# Patient Record
Sex: MALE | Race: WHITE | NOT HISPANIC OR LATINO | Employment: UNEMPLOYED | URBAN - METROPOLITAN AREA
[De-identification: names, ages, dates, MRNs, and addresses within clinical notes are randomized per-mention and may not be internally consistent; named-entity substitution may affect disease eponyms.]

---

## 2017-11-05 ENCOUNTER — APPOINTMENT (EMERGENCY)
Dept: CT IMAGING | Facility: HOSPITAL | Age: 52
End: 2017-11-05
Payer: COMMERCIAL

## 2017-11-05 ENCOUNTER — HOSPITAL ENCOUNTER (EMERGENCY)
Facility: HOSPITAL | Age: 52
Discharge: HOME | End: 2017-11-05
Attending: EMERGENCY MEDICINE
Payer: COMMERCIAL

## 2017-11-05 VITALS
SYSTOLIC BLOOD PRESSURE: 133 MMHG | TEMPERATURE: 98.1 F | RESPIRATION RATE: 16 BRPM | WEIGHT: 247 LBS | DIASTOLIC BLOOD PRESSURE: 76 MMHG | HEART RATE: 76 BPM | OXYGEN SATURATION: 100 %

## 2017-11-05 DIAGNOSIS — M54.50 LUMBAGO: Primary | ICD-10-CM

## 2017-11-05 LAB
ANION GAP SERPL CALCULATED.3IONS-SCNC: 12 MMOL/L (ref 4–13)
BACTERIA UR QL AUTO: ABNORMAL /HPF
BILIRUB UR QL STRIP: ABNORMAL
BUN SERPL-MCNC: 16 MG/DL (ref 5–25)
CALCIUM SERPL-MCNC: 9.3 MG/DL (ref 8.3–10.1)
CHLORIDE SERPL-SCNC: 102 MMOL/L (ref 100–108)
CLARITY UR: CLEAR
CO2 SERPL-SCNC: 27 MMOL/L (ref 21–32)
COLOR UR: YELLOW
COLOR, POC: YELLOW
CREAT SERPL-MCNC: 1.15 MG/DL (ref 0.6–1.3)
GFR SERPL CREATININE-BSD FRML MDRD: 73 ML/MIN/1.73SQ M
GLUCOSE SERPL-MCNC: 115 MG/DL (ref 65–140)
GLUCOSE UR STRIP-MCNC: NEGATIVE MG/DL
HGB UR QL STRIP.AUTO: NEGATIVE
KETONES UR STRIP-MCNC: NEGATIVE MG/DL
LEUKOCYTE ESTERASE UR QL STRIP: NEGATIVE
MUCOUS THREADS UR QL AUTO: ABNORMAL
NITRITE UR QL STRIP: NEGATIVE
NON-SQ EPI CELLS URNS QL MICRO: ABNORMAL /HPF
PH UR STRIP.AUTO: 5.5 [PH] (ref 4.5–8)
POTASSIUM SERPL-SCNC: 3.7 MMOL/L (ref 3.5–5.3)
PROT UR STRIP-MCNC: ABNORMAL MG/DL
RBC #/AREA URNS AUTO: ABNORMAL /HPF
SODIUM SERPL-SCNC: 141 MMOL/L (ref 136–145)
SP GR UR STRIP.AUTO: 1.02 (ref 1–1.03)
UROBILINOGEN UR QL STRIP.AUTO: 0.2 E.U./DL
WBC #/AREA URNS AUTO: ABNORMAL /HPF

## 2017-11-05 PROCEDURE — 96375 TX/PRO/DX INJ NEW DRUG ADDON: CPT

## 2017-11-05 PROCEDURE — 81002 URINALYSIS NONAUTO W/O SCOPE: CPT | Performed by: EMERGENCY MEDICINE

## 2017-11-05 PROCEDURE — 80048 BASIC METABOLIC PNL TOTAL CA: CPT | Performed by: EMERGENCY MEDICINE

## 2017-11-05 PROCEDURE — 81001 URINALYSIS AUTO W/SCOPE: CPT

## 2017-11-05 PROCEDURE — 74176 CT ABD & PELVIS W/O CONTRAST: CPT

## 2017-11-05 PROCEDURE — 36415 COLL VENOUS BLD VENIPUNCTURE: CPT | Performed by: EMERGENCY MEDICINE

## 2017-11-05 PROCEDURE — 96374 THER/PROPH/DIAG INJ IV PUSH: CPT

## 2017-11-05 PROCEDURE — 99284 EMERGENCY DEPT VISIT MOD MDM: CPT

## 2017-11-05 RX ORDER — OXYCODONE HYDROCHLORIDE AND ACETAMINOPHEN 5; 325 MG/1; MG/1
1 TABLET ORAL EVERY 4 HOURS PRN
Qty: 5 TABLET | Refills: 0 | Status: SHIPPED | OUTPATIENT
Start: 2017-11-05 | End: 2017-11-15

## 2017-11-05 RX ORDER — DIAZEPAM 5 MG/ML
10 INJECTION, SOLUTION INTRAMUSCULAR; INTRAVENOUS ONCE
Status: COMPLETED | OUTPATIENT
Start: 2017-11-05 | End: 2017-11-05

## 2017-11-05 RX ORDER — OXYCODONE HYDROCHLORIDE AND ACETAMINOPHEN 5; 325 MG/1; MG/1
1 TABLET ORAL ONCE
Status: COMPLETED | OUTPATIENT
Start: 2017-11-05 | End: 2017-11-05

## 2017-11-05 RX ORDER — METHOCARBAMOL 750 MG/1
750 TABLET, FILM COATED ORAL 3 TIMES DAILY PRN
Qty: 42 TABLET | Refills: 0 | Status: SHIPPED | OUTPATIENT
Start: 2017-11-05 | End: 2018-07-25

## 2017-11-05 RX ORDER — KETOROLAC TROMETHAMINE 30 MG/ML
15 INJECTION, SOLUTION INTRAMUSCULAR; INTRAVENOUS ONCE
Status: COMPLETED | OUTPATIENT
Start: 2017-11-05 | End: 2017-11-05

## 2017-11-05 RX ORDER — PREDNISONE 20 MG/1
20 TABLET ORAL 2 TIMES DAILY
Qty: 10 TABLET | Refills: 0 | Status: SHIPPED | OUTPATIENT
Start: 2017-11-05 | End: 2018-07-25

## 2017-11-05 RX ADMIN — OXYCODONE HYDROCHLORIDE AND ACETAMINOPHEN 1 TABLET: 5; 325 TABLET ORAL at 16:43

## 2017-11-05 RX ADMIN — KETOROLAC TROMETHAMINE 15 MG: 30 INJECTION, SOLUTION INTRAMUSCULAR at 14:18

## 2017-11-05 RX ADMIN — DIAZEPAM 10 MG: 5 INJECTION, SOLUTION INTRAMUSCULAR; INTRAVENOUS at 14:18

## 2017-11-05 NOTE — ED NOTES
Pt states that he has had back pain before and states that he started on Friday with back pain that was going down his right leg  Pt is unable to sit or lay with out a ton of pain       Gloria Garrison RN  11/05/17 8668

## 2017-11-05 NOTE — ED PROVIDER NOTES
History  Chief Complaint   Patient presents with    Back Pain     patient reports three days ago waking up with lower back pain which radiates to both lets, but mainly to the R leg  patient denies numbness, reports tingling in the R arm and sharp pains in the L side of his chest  patient states he has had back pain in the past, but reports this pain is new for him  Patient was at pcp's and prescribed muscle relaxers which he states has given him no relief  47 yo male c/o onset of pain he localizes to right lower SI area, radiating down right leg, that seemed to be fairly sudden onset about 3 weeks ago, followed up with PCP and tried flexeril but it hasn't made any difference  The pain is more exacerbated today  He denies abdominal pain, but says "tightening" his abdomen feels better, and if he relaxes it causes back pain  He has h/o back pain for which he has previously sought ED care, but this seems different  No h/o kidney stones  History provided by:  Patient  Back Pain   Location:  Lumbar spine  Quality:  Aching  Pain severity:  Moderate  Onset quality:  Gradual  Duration:  3 weeks  Chronicity:  Recurrent (although he says this pain is actually, different and worse than usual)  Relieved by:  Nothing  Worsened by: Movement  Ineffective treatments:  NSAIDs and muscle relaxants  Associated symptoms: paresthesias    Associated symptoms: no abdominal pain, no bladder incontinence, no bowel incontinence, no chest pain, no dysuria, no fever, no headaches, no perianal numbness, no tingling and no weakness        None       Past Medical History:   Diagnosis Date    Collapsed lung     Hypertension     Umbilical hernia        Past Surgical History:   Procedure Laterality Date    ROTATOR CUFF REPAIR         History reviewed  No pertinent family history  I have reviewed and agree with the history as documented      Social History   Substance Use Topics    Smoking status: Former Smoker    Smokeless tobacco: Not on file    Alcohol use No        Review of Systems   Constitutional: Negative for appetite change, chills and fever  HENT: Negative for sore throat  Respiratory: Negative for cough, shortness of breath and wheezing  Cardiovascular: Negative for chest pain and palpitations  Gastrointestinal: Negative for abdominal pain, bowel incontinence, diarrhea, nausea and vomiting  Genitourinary: Negative for bladder incontinence, dysuria and hematuria  Musculoskeletal: Positive for back pain  Negative for neck pain  Skin: Negative for rash  Neurological: Positive for paresthesias  Negative for dizziness, tingling, weakness and headaches  Psychiatric/Behavioral: Negative for suicidal ideas  All other systems reviewed and are negative  Physical Exam  ED Triage Vitals   Temperature Pulse Respirations Blood Pressure SpO2   11/05/17 1313 11/05/17 1311 11/05/17 1311 11/05/17 1311 11/05/17 1311   98 1 °F (36 7 °C) 98 20 (!) 160/106 96 %      Temp Source Heart Rate Source Patient Position - Orthostatic VS BP Location FiO2 (%)   11/05/17 1313 11/05/17 1311 11/05/17 1311 11/05/17 1311 --   Oral Monitor Sitting Right arm       Pain Score       11/05/17 1311       Worst Possible Pain           Orthostatic Vital Signs  Vitals:    11/05/17 1311 11/05/17 1423   BP: (!) 160/106 125/96   Pulse: 98 94   Patient Position - Orthostatic VS: Sitting Sitting       Physical Exam   Constitutional: He is oriented to person, place, and time  He appears well-developed and well-nourished  Patient standing in the room upon initial examination  Seems to prefer a static position, pain is manageable standing, or sitting, but changing position reproduces disocomfort   HENT:   Head: Normocephalic and atraumatic     Right Ear: External ear normal    Left Ear: External ear normal    Nose: Nose normal    Mouth/Throat: Oropharynx is clear and moist    Eyes: Conjunctivae and EOM are normal  Pupils are equal, round, and reactive to light  Neck: Normal range of motion  Neck supple  Cardiovascular: Normal rate  Pulmonary/Chest: Effort normal    Abdominal: There is no tenderness  Musculoskeletal: Normal range of motion  Neurological: He is alert and oriented to person, place, and time  He displays no atrophy and normal reflexes  No cranial nerve deficit  Gait (antalgic gait, but bears weight, transfers to sitting, ) abnormal  Coordination normal  GCS eye subscore is 4  GCS verbal subscore is 5  GCS motor subscore is 6  Straight leg positive on the right   Skin: Skin is warm and dry  Psychiatric: He has a normal mood and affect  His behavior is normal  Judgment and thought content normal    Nursing note and vitals reviewed        ED Medications  Medications   oxyCODONE-acetaminophen (PERCOCET) 5-325 mg per tablet 1 tablet (not administered)   ketorolac (TORADOL) 30 mg/mL injection 15 mg (15 mg Intravenous Given 11/5/17 1418)   diazepam (VALIUM) injection 10 mg (10 mg Intravenous Given 11/5/17 1418)       Diagnostic Studies  Results Reviewed     Procedure Component Value Units Date/Time    Urine Microscopic [[id number]]  (Abnormal) Collected:  11/05/17 1605    Lab Status:  Final result Specimen:  Urine Updated:  11/05/17 1508     RBC, UA 0-1 (A) /hpf      WBC, UA 1-2 (A) /hpf      Epithelial Cells Occasional /hpf      Bacteria, UA Occasional /hpf      MUCOUS THREADS Occasional    POCT urinalysis dipstick [[id number]]  (Normal) Resulted:  11/05/17 1452    Lab Status:  Final result Specimen:  Urine Updated:  11/05/17 1452     Color, UA yellow    ED Urine Macroscopic [[id number]]  (Abnormal) Collected:  11/05/17 1605    Lab Status:  Final result Specimen:  Urine Updated:  11/05/17 1450     Color, UA Yellow     Clarity, UA Clear     pH, UA 5 5     Leukocytes, UA Negative     Nitrite, UA Negative     Protein, UA Trace (A) mg/dl      Glucose, UA Negative mg/dl      Ketones, UA Negative mg/dl      Urobilinogen, UA 0 2 E U /dl Bilirubin, UA Interference- unable to analyze (A)     Blood, UA Negative     Specific Gravity, UA 1 020    Narrative:       CLINITEK RESULT    Basic metabolic panel [[id number]] Collected:  11/05/17 1417    Lab Status:  Final result Specimen:  Blood from Arm, Right Updated:  11/05/17 1435     Sodium 141 mmol/L      Potassium 3 7 mmol/L      Chloride 102 mmol/L      CO2 27 mmol/L      Anion Gap 12 mmol/L      BUN 16 mg/dL      Creatinine 1 15 mg/dL      Glucose 115 mg/dL      Calcium 9 3 mg/dL      eGFR 73 ml/min/1 73sq m     Narrative:         National Kidney Disease Education Program recommendations are as follows:  GFR calculation is accurate only with a steady state creatinine  Chronic Kidney disease less than 60 ml/min/1 73 sq  meters  Kidney failure less than 15 ml/min/1 73 sq  meters  CT recon only lumbar spine   Final Result by Jen Peralta MD (11/05 1613)      No fracture or traumatic subluxation  Workstation performed: [id number]         CT abdomen pelvis wo contrast   Final Result by Jen Peralta MD (11/05 1612)      No urinary tract calculi  Bladder wall thickening may be due to underdistention, however, cystitis is not excluded  Correlation with urinalysis recommended  Hepatosplenomegaly  Hepatic steatosis  Workstation performed: [id number]                    Procedures  Procedures       Phone Contacts  ED Phone Contact    ED Course  ED Course as of Nov 05 1634   Ines Han Nov 05, 2017   5248 He is more comfortable after ED treatment  Imaging studies reassuring  No focal deficits                                    MDM  CritCare Time    Disposition  Final diagnoses:   Lumbago     Time reflects when diagnosis was documented in both MDM as applicable and the Disposition within this note     Time User Action Codes Description Comment    11/5/2017  4:33 PM Tisha Frankel Add [M54 5] Kenan 3958       ED Disposition     ED Disposition Condition Comment    Discharge Hi Lowe discharge to home/self care  Condition at discharge: Good        Follow-up Information     Follow up With Specialties Details Why Selena Bianchi MD  Call For followup [address] 743 [phone] [address] Pain Medicine Go to For followup, Next available, If symptoms worsen Anirudh Velasco  [address]  [phone]        Patient's Medications   Discharge Prescriptions    METHOCARBAMOL (ROBAXIN) 750 MG TABLET    Take 1 tablet by mouth 3 (three) times a day as needed for muscle spasms       Start Date: 11/5/2017 End Date: --       Order Dose: 750 mg       Quantity: 42 tablet    Refills: 0    OXYCODONE-ACETAMINOPHEN (PERCOCET) 5-325 MG PER TABLET    Take 1 tablet by mouth every 4 (four) hours as needed for severe pain for up to 10 days Max Daily Amount: 6 tablets       Start Date: 11/5/2017 End Date: 11/15/2017       Order Dose: 1 tablet       Quantity: 5 tablet    Refills: 0    PREDNISONE 20 MG TABLET    Take 1 tablet by mouth 2 (two) times a day       Start Date: 11/5/2017 End Date: --       Order Dose: 20 mg       Quantity: 10 tablet    Refills: 0     No discharge procedures on file      ED Provider  Electronically Signed by           Spencer Sullivan MD  11/05/17 9182

## 2017-11-05 NOTE — DISCHARGE INSTRUCTIONS
Low Back Strain   GENERAL INFORMATION:   Low back strain  is an injury to your lower back muscles or tendons  Tendons are strong tissues that connect muscles to bones  The lower back supports most of your body weight and helps you move, twist, and bend  Low back strain is usually caused by activities that increase stress on the lower back, such as exercise or injury  Common symptoms include the following:   · Low back pain or muscle spasms    · Stiffness or limited movement    · Pain that goes down to the buttocks, groin, or legs    · Pain that is worse with activity  Seek immediate care for the following symptoms:   · A pop in your lower back    · Increased swelling or pain in your lower back    · Trouble moving your legs    · Numbness in your legs  Treatment for low back strain:   · NSAIDs  help decrease swelling and pain or fever  This medicine is available with or without a doctor's order  NSAIDs can cause stomach bleeding or kidney problems in certain people  If you take blood thinner medicine, always ask your healthcare provider if NSAIDs are safe for you  Always read the medicine label and follow directions  · Muscle relaxers  help decrease muscle spasms pain  ·   Manage your symptoms:   · Rest  in bed after your injury  Slowly start to increase your activity as the pain decreases, or as directed  · Apply ice  on your lower back for 15 to 20 minutes every hour or as directed  Use an ice pack, or put crushed ice in a plastic bag  Cover it with a towel  Ice helps prevent tissue damage and decreases swelling and pain  You can alternate ice and heat  · Apply heat  on your lower back for 20 to 30 minutes every 2 hours for as many days as directed  Heat helps decrease pain and muscle spasms  Prevent another low back strain:   · Use proper body mechanics  ¨ Bend at the hips and knees when you  objects  Do not bend from the waist  Use your leg muscles as you lift the load   Do not use your back  Keep the object close to your chest as you lift it  Try not to twist or lift anything above your waist     ¨ Change your position often when you stand for long periods of time  Rest one foot on a small box or footrest, and then switch to the other foot often  ¨ Try not to sit for long periods of time  When you do, sit in a straight-backed chair with your feet flat on the floor  Never reach, pull, or push while you are sitting      ·   Follow up with your healthcare provider as directed

## 2018-01-23 ENCOUNTER — TRANSCRIBE ORDERS (OUTPATIENT)
Dept: ADMINISTRATIVE | Facility: HOSPITAL | Age: 53
End: 2018-01-23

## 2018-01-23 DIAGNOSIS — R06.81 APNEA: Primary | ICD-10-CM

## 2018-07-25 ENCOUNTER — HOSPITAL ENCOUNTER (EMERGENCY)
Facility: HOSPITAL | Age: 53
Discharge: HOME | End: 2018-07-25
Admitting: EMERGENCY MEDICINE
Payer: COMMERCIAL

## 2018-07-25 VITALS
HEART RATE: 77 BPM | RESPIRATION RATE: 20 BRPM | SYSTOLIC BLOOD PRESSURE: 166 MMHG | DIASTOLIC BLOOD PRESSURE: 68 MMHG | TEMPERATURE: 97.7 F | OXYGEN SATURATION: 98 %

## 2018-07-25 DIAGNOSIS — G89.29 ACUTE EXACERBATION OF CHRONIC LOW BACK PAIN: Primary | ICD-10-CM

## 2018-07-25 DIAGNOSIS — M54.50 ACUTE EXACERBATION OF CHRONIC LOW BACK PAIN: Primary | ICD-10-CM

## 2018-07-25 PROCEDURE — 96372 THER/PROPH/DIAG INJ SC/IM: CPT

## 2018-07-25 PROCEDURE — 99283 EMERGENCY DEPT VISIT LOW MDM: CPT

## 2018-07-25 RX ORDER — TRAMADOL HYDROCHLORIDE 50 MG/1
50 TABLET ORAL EVERY 6 HOURS PRN
Status: ON HOLD | COMMUNITY
End: 2019-04-27 | Stop reason: SDUPTHER

## 2018-07-25 RX ORDER — PHENYLEPHRINE HCL 2.5 %
1 DROPS OPHTHALMIC (EYE) ONCE
Status: ON HOLD | COMMUNITY
End: 2019-04-26 | Stop reason: ALTCHOICE

## 2018-07-25 RX ORDER — KETOROLAC TROMETHAMINE 30 MG/ML
15 INJECTION, SOLUTION INTRAMUSCULAR; INTRAVENOUS ONCE
Status: COMPLETED | OUTPATIENT
Start: 2018-07-25 | End: 2018-07-25

## 2018-07-25 RX ORDER — DIAZEPAM 5 MG/1
5 TABLET ORAL EVERY 8 HOURS PRN
Qty: 6 TABLET | Refills: 0 | Status: ON HOLD | OUTPATIENT
Start: 2018-07-25 | End: 2019-04-26 | Stop reason: ALTCHOICE

## 2018-07-25 RX ORDER — DIAZEPAM 5 MG/1
5 TABLET ORAL ONCE
Status: COMPLETED | OUTPATIENT
Start: 2018-07-25 | End: 2018-07-25

## 2018-07-25 RX ORDER — NAPROXEN 500 MG/1
500 TABLET ORAL 2 TIMES DAILY WITH MEALS
Qty: 10 TABLET | Refills: 0 | Status: ON HOLD | OUTPATIENT
Start: 2018-07-25 | End: 2019-04-26 | Stop reason: ALTCHOICE

## 2018-07-25 RX ADMIN — DIAZEPAM 5 MG: 5 TABLET ORAL at 20:02

## 2018-07-25 RX ADMIN — KETOROLAC TROMETHAMINE 15 MG: 30 INJECTION, SOLUTION INTRAMUSCULAR at 20:02

## 2018-07-25 NOTE — ED PROVIDER NOTES
History  Chief Complaint   Patient presents with    Back Pain     Patient reports lower back pain described as an "on going problem"  Reports pain increased yesterday  C/o radiation of pain down left leg, difficulty sleeping, and ambulating  Denies loss of bowel or bladder function  Patient states he went down a water slide with jessica and at the bottom of the slide he reports he felt sharp pain when he hit the water  Nerve block scheduled for next week  46year old male presents today complaining of acute exacerbation of chronic back pain  Pain worse since yesterday, feels the same as usual "just worse"  Denies recent trauma or heavy lifting  Was at a water park with his grandson, pain began while going down a water slide  Sees a specialist for same  Is scheduled for an injection next week  Denies fevers, chills, headaches, visual changes, chest pain, shortness of breath, abdominal pain, hematuria, bowel or bladder incontinence, difficulty ambulating, numbness, weakness  Prior to Admission Medications   Prescriptions Last Dose Informant Patient Reported? Taking? FENOPROFEN CALCIUM PO   Yes Yes   Sig: Take 600 mg by mouth as needed   phenylephrine (NAEEM-SYNEPHRINE) 2 5 % ophthalmic solution   Yes Yes   Si drop once   traMADol (ULTRAM) 50 mg tablet   Yes Yes   Sig: Take 50 mg by mouth every 6 (six) hours as needed for moderate pain      Facility-Administered Medications: None       Past Medical History:   Diagnosis Date    Collapsed lung     Hypertension     Umbilical hernia        Past Surgical History:   Procedure Laterality Date    ROTATOR CUFF REPAIR         History reviewed  No pertinent family history  I have reviewed and agree with the history as documented  Social History   Substance Use Topics    Smoking status: Former Smoker    Smokeless tobacco: Never Used    Alcohol use No        Review of Systems   Musculoskeletal: Positive for back pain     All other systems reviewed and are negative  Physical Exam  Physical Exam   Constitutional: He is oriented to person, place, and time  He appears well-developed and well-nourished  No distress  HENT:   Head: Normocephalic and atraumatic  Eyes: Conjunctivae and EOM are normal    Neck: Normal range of motion  Cardiovascular: Normal rate, regular rhythm and normal heart sounds  Pulmonary/Chest: Effort normal and breath sounds normal  No respiratory distress  He has no wheezes  Abdominal: Soft  Bowel sounds are normal  He exhibits no distension  There is no tenderness  There is no guarding  Musculoskeletal:        Lumbar back: He exhibits decreased range of motion (due to pain) and tenderness  He exhibits no bony tenderness, no swelling, no edema, no deformity, no laceration, no spasm and normal pulse  Back:    Neurological: He is alert and oriented to person, place, and time  He displays normal reflexes  No cranial nerve deficit or sensory deficit  He exhibits normal muscle tone  Coordination normal    Skin: Skin is warm and dry  Capillary refill takes less than 2 seconds  He is not diaphoretic  Psychiatric: He has a normal mood and affect   His behavior is normal        Vital Signs  ED Triage Vitals   Temperature Pulse Respirations Blood Pressure SpO2   07/25/18 1922 07/25/18 1922 07/25/18 1922 07/25/18 1923 07/25/18 1922   97 7 °F (36 5 °C) 77 20 166/68 98 %      Temp Source Heart Rate Source Patient Position - Orthostatic VS BP Location FiO2 (%)   07/25/18 1922 07/25/18 1922 07/25/18 1922 07/25/18 1922 --   Temporal Monitor Sitting Right arm       Pain Score       07/25/18 1922       Worst Possible Pain           Vitals:    07/25/18 1922 07/25/18 1923   BP:  166/68   Pulse: 77    Patient Position - Orthostatic VS: Sitting        Visual Acuity      ED Medications  Medications   diazepam (VALIUM) tablet 5 mg (5 mg Oral Given 7/25/18 2002)   ketorolac (TORADOL) injection 15 mg (15 mg Intramuscular Given 7/25/18 2002) Diagnostic Studies  Results Reviewed     None                 No orders to display              Procedures  Procedures       Phone Contacts  ED Phone Contact    ED Course                               MDM  CritCare Time    Disposition  Final diagnoses:   Acute exacerbation of chronic low back pain     Time reflects when diagnosis was documented in both MDM as applicable and the Disposition within this note     Time User Action Codes Description Comment    7/25/2018  8:17 PM Esvin Diamond [M54 5,  G89 29] Acute exacerbation of chronic low back pain       ED Disposition     ED Disposition Condition Comment    Discharge  Marjevon Emmie Lowe discharge to home/self care  Condition at discharge: Good        Follow-up Information     Follow up With Specialties Details Why Svitlana Aguilera MD Family Medicine Schedule an appointment as soon as possible for a visit  [address]  [phone]            Discharge Medication List as of 7/25/2018  8:19 PM      START taking these medications    Details   diazepam (VALIUM) 5 mg tablet Take 1 tablet (5 mg total) by mouth every 8 (eight) hours as needed for muscle spasms, Starting Wed 7/25/2018, Print      naproxen (NAPROSYN) 500 mg tablet Take 1 tablet (500 mg total) by mouth 2 (two) times a day with meals for 5 days, Starting Wed 7/25/2018, Until Mon 7/30/2018, Print         CONTINUE these medications which have NOT CHANGED    Details   FENOPROFEN CALCIUM PO Take 600 mg by mouth as needed, Historical Med      phenylephrine (NAEEM-SYNEPHRINE) 2 5 % ophthalmic solution 1 drop once, Historical Med      traMADol (ULTRAM) 50 mg tablet Take 50 mg by mouth every 6 (six) hours as needed for moderate pain, Historical Med           No discharge procedures on file      ED Provider  Electronically Signed by           Neto Angeles PA-C  08/23/18 9826

## 2018-07-26 NOTE — ED NOTES
Educated patient on safety with valium at this time      Shreya Ding [facility]  07/25/18 2028

## 2018-07-26 NOTE — DISCHARGE INSTRUCTIONS
Chronic Back Pain   WHAT YOU NEED TO KNOW:   Chronic back pain is back pain that lasts 3 months or longer  This may include pain that has not been controlled or does not improve with treatment  Your back pain may cause weakness or pain that spreads to your arms or legs  DISCHARGE INSTRUCTIONS:   Return to the emergency department if:   · You have severe pain  · You have new signs of numbness or weakness, especially in your lower back, legs, arms, or genital area  · You lose control of your bladder or bowel movements  · You have a fever or sudden weight loss  Contact your healthcare provider if:   · You have new or worsened pain  · You have questions or concerns about your condition or care  Medicines:   · NSAIDs  help decrease swelling and pain  This medicine can be bought with or without a doctor's order  This medicine can cause stomach bleeding or kidney problems in certain people  If you take blood thinner medicine, always ask your healthcare provider if NSAIDs are safe for you  Always read the medicine label and follow the directions on it before using this medicine  · Acetaminophen  decreases pain  It is available without a doctor's order  Ask how much to take and how often to take it  Follow directions  Acetaminophen can cause liver damage if not taken correctly  · Prescription pain medicine  may also be given to decrease pain  Do not wait until the pain is severe before you take this medicine  · Muscle relaxers  help decrease muscle spasms and back pain  · Take your medicine as directed  Contact your healthcare provider if you think your medicine is not helping or if you have side effects  Tell him if you are allergic to any medicine  Keep a list of the medicines, vitamins, and herbs you take  Include the amounts, and when and why you take them  Bring the list or the pill bottles to follow-up visits  Carry your medicine list with you in case of an emergency    Follow up with your healthcare provider as directed: You may be referred to a sports medicine or spine specialist  Write down your questions so you remember to ask them during your visits  Manage your chronic back pain:   · Heat  helps decrease pain and muscle spasms  Apply heat on your back for 15 to 20 minutes every 2 hours or as often as directed  · Stay active  as much as you can without causing more pain  Ask your healthcare provider what exercises are right for you  Do not sit or lie down for long periods  This could make your back pain worse  Avoid heavy lifting until your pain is gone  · A physical therapist  can teach you exercises to help improve movement and strength, and to decrease pain  © 2017 [address] [facility] Information is for End User's use only and may not be sold, redistributed or otherwise used for commercial purposes  All illustrations and images included in CareNotes® are the copyrighted property of [employer]  or Rishi Belle  The above information is an  only  It is not intended as medical advice for individual conditions or treatments  Talk to your doctor, nurse or pharmacist before following any medical regimen to see if it is safe and effective for you

## 2019-04-25 ENCOUNTER — APPOINTMENT (EMERGENCY)
Dept: RADIOLOGY | Facility: HOSPITAL | Age: 54
DRG: 201 | End: 2019-04-25
Payer: COMMERCIAL

## 2019-04-25 ENCOUNTER — HOSPITAL ENCOUNTER (INPATIENT)
Facility: HOSPITAL | Age: 54
LOS: 2 days | Discharge: HOME | DRG: 201 | End: 2019-04-27
Attending: EMERGENCY MEDICINE | Admitting: INTERNAL MEDICINE
Payer: COMMERCIAL

## 2019-04-25 DIAGNOSIS — M54.50 CHRONIC BILATERAL LOW BACK PAIN WITHOUT SCIATICA: Chronic | ICD-10-CM

## 2019-04-25 DIAGNOSIS — G89.29 CHRONIC BILATERAL LOW BACK PAIN WITHOUT SCIATICA: Chronic | ICD-10-CM

## 2019-04-25 DIAGNOSIS — J93.11 PRIMARY SPONTANEOUS PNEUMOTHORAX: ICD-10-CM

## 2019-04-25 DIAGNOSIS — J93.9 PNEUMOTHORAX ON LEFT: Primary | ICD-10-CM

## 2019-04-25 LAB
ALBUMIN SERPL BCP-MCNC: 4.1 G/DL (ref 3.5–5)
ALP SERPL-CCNC: 59 U/L (ref 46–116)
ALT SERPL W P-5'-P-CCNC: 44 U/L (ref 12–78)
ANION GAP SERPL CALCULATED.3IONS-SCNC: 9 MMOL/L (ref 4–13)
APTT PPP: 36 SECONDS (ref 26–38)
AST SERPL W P-5'-P-CCNC: 14 U/L (ref 5–45)
BASOPHILS # BLD AUTO: 0.07 THOUSANDS/ΜL (ref 0–0.1)
BASOPHILS NFR BLD AUTO: 1 % (ref 0–1)
BILIRUB SERPL-MCNC: 0.29 MG/DL (ref 0.2–1)
BUN SERPL-MCNC: 9 MG/DL (ref 5–25)
CALCIUM SERPL-MCNC: 9.2 MG/DL (ref 8.3–10.1)
CHLORIDE SERPL-SCNC: 104 MMOL/L (ref 100–108)
CO2 SERPL-SCNC: 27 MMOL/L (ref 21–32)
CREAT SERPL-MCNC: 1.06 MG/DL (ref 0.6–1.3)
DEPRECATED D DIMER PPP: 325 NG/ML (FEU)
EOSINOPHIL # BLD AUTO: 0.4 THOUSAND/ΜL (ref 0–0.61)
EOSINOPHIL NFR BLD AUTO: 4 % (ref 0–6)
ERYTHROCYTE [DISTWIDTH] IN BLOOD BY AUTOMATED COUNT: 12.5 % (ref 11.6–15.1)
GFR SERPL CREATININE-BSD FRML MDRD: 80 ML/MIN/1.73SQ M
GLUCOSE SERPL-MCNC: 104 MG/DL (ref 65–140)
HCT VFR BLD AUTO: 46 % (ref 36.5–49.3)
HGB BLD-MCNC: 15.8 G/DL (ref 12–17)
IMM GRANULOCYTES # BLD AUTO: 0.03 THOUSAND/UL (ref 0–0.2)
IMM GRANULOCYTES NFR BLD AUTO: 0 % (ref 0–2)
INR PPP: 1.04 (ref 0.86–1.17)
LYMPHOCYTES # BLD AUTO: 2.99 THOUSANDS/ΜL (ref 0.6–4.47)
LYMPHOCYTES NFR BLD AUTO: 27 % (ref 14–44)
MCH RBC QN AUTO: 30.7 PG (ref 26.8–34.3)
MCHC RBC AUTO-ENTMCNC: 34.3 G/DL (ref 31.4–37.4)
MCV RBC AUTO: 89 FL (ref 82–98)
MONOCYTES # BLD AUTO: 0.75 THOUSAND/ΜL (ref 0.17–1.22)
MONOCYTES NFR BLD AUTO: 7 % (ref 4–12)
NEUTROPHILS # BLD AUTO: 6.69 THOUSANDS/ΜL (ref 1.85–7.62)
NEUTS SEG NFR BLD AUTO: 61 % (ref 43–75)
NRBC BLD AUTO-RTO: 0 /100 WBCS
PLATELET # BLD AUTO: 211 THOUSANDS/UL (ref 149–390)
PMV BLD AUTO: 9.1 FL (ref 8.9–12.7)
POTASSIUM SERPL-SCNC: 4 MMOL/L (ref 3.5–5.3)
PROT SERPL-MCNC: 7.9 G/DL (ref 6.4–8.2)
PROTHROMBIN TIME: 13.7 SECONDS (ref 11.8–14.2)
RBC # BLD AUTO: 5.15 MILLION/UL (ref 3.88–5.62)
SODIUM SERPL-SCNC: 140 MMOL/L (ref 136–145)
WBC # BLD AUTO: 10.93 THOUSAND/UL (ref 4.31–10.16)

## 2019-04-25 PROCEDURE — 80053 COMPREHEN METABOLIC PANEL: CPT | Performed by: EMERGENCY MEDICINE

## 2019-04-25 PROCEDURE — 99285 EMERGENCY DEPT VISIT HI MDM: CPT

## 2019-04-25 PROCEDURE — 85730 THROMBOPLASTIN TIME PARTIAL: CPT | Performed by: EMERGENCY MEDICINE

## 2019-04-25 PROCEDURE — 96374 THER/PROPH/DIAG INJ IV PUSH: CPT

## 2019-04-25 PROCEDURE — 96376 TX/PRO/DX INJ SAME DRUG ADON: CPT

## 2019-04-25 PROCEDURE — 85610 PROTHROMBIN TIME: CPT | Performed by: EMERGENCY MEDICINE

## 2019-04-25 PROCEDURE — 71046 X-RAY EXAM CHEST 2 VIEWS: CPT

## 2019-04-25 PROCEDURE — 36415 COLL VENOUS BLD VENIPUNCTURE: CPT | Performed by: EMERGENCY MEDICINE

## 2019-04-25 PROCEDURE — 85025 COMPLETE CBC W/AUTO DIFF WBC: CPT | Performed by: EMERGENCY MEDICINE

## 2019-04-25 PROCEDURE — 96361 HYDRATE IV INFUSION ADD-ON: CPT

## 2019-04-25 PROCEDURE — 71045 X-RAY EXAM CHEST 1 VIEW: CPT

## 2019-04-25 PROCEDURE — 93005 ELECTROCARDIOGRAM TRACING: CPT

## 2019-04-25 PROCEDURE — 0W9B30Z DRAINAGE OF LEFT PLEURAL CAVITY WITH DRAINAGE DEVICE, PERCUTANEOUS APPROACH: ICD-10-PCS | Performed by: THORACIC SURGERY (CARDIOTHORACIC VASCULAR SURGERY)

## 2019-04-25 PROCEDURE — 32556 INSERT CATH PLEURA W/O IMAGE: CPT | Performed by: EMERGENCY MEDICINE

## 2019-04-25 PROCEDURE — 85379 FIBRIN DEGRADATION QUANT: CPT | Performed by: EMERGENCY MEDICINE

## 2019-04-25 PROCEDURE — 96375 TX/PRO/DX INJ NEW DRUG ADDON: CPT

## 2019-04-25 PROCEDURE — 99285 EMERGENCY DEPT VISIT HI MDM: CPT | Performed by: EMERGENCY MEDICINE

## 2019-04-25 RX ORDER — KETOROLAC TROMETHAMINE 30 MG/ML
30 INJECTION, SOLUTION INTRAMUSCULAR; INTRAVENOUS ONCE
Status: COMPLETED | OUTPATIENT
Start: 2019-04-25 | End: 2019-04-25

## 2019-04-25 RX ORDER — FENTANYL CITRATE 50 UG/ML
50 INJECTION, SOLUTION INTRAMUSCULAR; INTRAVENOUS ONCE
Status: COMPLETED | OUTPATIENT
Start: 2019-04-25 | End: 2019-04-25

## 2019-04-25 RX ORDER — MIDAZOLAM HYDROCHLORIDE 1 MG/ML
2.5 INJECTION INTRAMUSCULAR; INTRAVENOUS ONCE
Status: DISCONTINUED | OUTPATIENT
Start: 2019-04-25 | End: 2019-04-27 | Stop reason: HOSPADM

## 2019-04-25 RX ORDER — SODIUM CHLORIDE 9 MG/ML
125 INJECTION, SOLUTION INTRAVENOUS CONTINUOUS
Status: CANCELLED | OUTPATIENT
Start: 2019-04-25

## 2019-04-25 RX ORDER — LIDOCAINE HYDROCHLORIDE AND EPINEPHRINE 10; 10 MG/ML; UG/ML
20 INJECTION, SOLUTION INFILTRATION; PERINEURAL ONCE
Status: DISCONTINUED | OUTPATIENT
Start: 2019-04-25 | End: 2019-04-27 | Stop reason: HOSPADM

## 2019-04-25 RX ADMIN — SODIUM CHLORIDE 1000 ML: 0.9 INJECTION, SOLUTION INTRAVENOUS at 22:16

## 2019-04-25 RX ADMIN — FENTANYL CITRATE 50 MCG: 50 INJECTION, SOLUTION INTRAMUSCULAR; INTRAVENOUS at 22:54

## 2019-04-25 RX ADMIN — KETOROLAC TROMETHAMINE 30 MG: 30 INJECTION, SOLUTION INTRAMUSCULAR at 22:17

## 2019-04-25 RX ADMIN — FENTANYL CITRATE 50 MCG: 50 INJECTION, SOLUTION INTRAMUSCULAR; INTRAVENOUS at 23:35

## 2019-04-26 ENCOUNTER — APPOINTMENT (INPATIENT)
Dept: CT IMAGING | Facility: HOSPITAL | Age: 54
DRG: 201 | End: 2019-04-26
Payer: COMMERCIAL

## 2019-04-26 PROBLEM — M54.9 CHRONIC BACK PAIN: Chronic | Status: ACTIVE | Noted: 2019-04-26

## 2019-04-26 PROBLEM — G89.29 CHRONIC BACK PAIN: Chronic | Status: ACTIVE | Noted: 2019-04-26

## 2019-04-26 PROBLEM — G47.33 OSA ON CPAP: Chronic | Status: ACTIVE | Noted: 2019-04-26

## 2019-04-26 PROBLEM — Z99.89 OSA ON CPAP: Chronic | Status: ACTIVE | Noted: 2019-04-26

## 2019-04-26 PROBLEM — J93.11 PRIMARY SPONTANEOUS PNEUMOTHORAX: Status: ACTIVE | Noted: 2019-04-26

## 2019-04-26 LAB
ANION GAP SERPL CALCULATED.3IONS-SCNC: 11 MMOL/L (ref 4–13)
BASOPHILS # BLD AUTO: 0.03 THOUSANDS/ΜL (ref 0–0.1)
BASOPHILS NFR BLD AUTO: 0 % (ref 0–1)
BUN SERPL-MCNC: 11 MG/DL (ref 5–25)
CALCIUM SERPL-MCNC: 8.9 MG/DL (ref 8.3–10.1)
CHLORIDE SERPL-SCNC: 106 MMOL/L (ref 100–108)
CO2 SERPL-SCNC: 24 MMOL/L (ref 21–32)
CREAT SERPL-MCNC: 1.08 MG/DL (ref 0.6–1.3)
EOSINOPHIL # BLD AUTO: 0.37 THOUSAND/ΜL (ref 0–0.61)
EOSINOPHIL NFR BLD AUTO: 5 % (ref 0–6)
ERYTHROCYTE [DISTWIDTH] IN BLOOD BY AUTOMATED COUNT: 12.6 % (ref 11.6–15.1)
GFR SERPL CREATININE-BSD FRML MDRD: 78 ML/MIN/1.73SQ M
GLUCOSE SERPL-MCNC: 97 MG/DL (ref 65–140)
HCT VFR BLD AUTO: 42.8 % (ref 36.5–49.3)
HCV AB SER QL: NORMAL
HGB BLD-MCNC: 14.2 G/DL (ref 12–17)
IMM GRANULOCYTES # BLD AUTO: 0.04 THOUSAND/UL (ref 0–0.2)
IMM GRANULOCYTES NFR BLD AUTO: 1 % (ref 0–2)
LYMPHOCYTES # BLD AUTO: 2.28 THOUSANDS/ΜL (ref 0.6–4.47)
LYMPHOCYTES NFR BLD AUTO: 31 % (ref 14–44)
MCH RBC QN AUTO: 30.5 PG (ref 26.8–34.3)
MCHC RBC AUTO-ENTMCNC: 33.2 G/DL (ref 31.4–37.4)
MCV RBC AUTO: 92 FL (ref 82–98)
MONOCYTES # BLD AUTO: 0.52 THOUSAND/ΜL (ref 0.17–1.22)
MONOCYTES NFR BLD AUTO: 7 % (ref 4–12)
NEUTROPHILS # BLD AUTO: 4.2 THOUSANDS/ΜL (ref 1.85–7.62)
NEUTS SEG NFR BLD AUTO: 56 % (ref 43–75)
NRBC BLD AUTO-RTO: 0 /100 WBCS
PLATELET # BLD AUTO: 157 THOUSANDS/UL (ref 149–390)
PMV BLD AUTO: 9.4 FL (ref 8.9–12.7)
POTASSIUM SERPL-SCNC: 4.5 MMOL/L (ref 3.5–5.3)
RBC # BLD AUTO: 4.65 MILLION/UL (ref 3.88–5.62)
SODIUM SERPL-SCNC: 141 MMOL/L (ref 136–145)
WBC # BLD AUTO: 7.44 THOUSAND/UL (ref 4.31–10.16)

## 2019-04-26 PROCEDURE — 80048 BASIC METABOLIC PNL TOTAL CA: CPT | Performed by: NURSE PRACTITIONER

## 2019-04-26 PROCEDURE — 71250 CT THORAX DX C-: CPT

## 2019-04-26 PROCEDURE — 99254 IP/OBS CNSLTJ NEW/EST MOD 60: CPT | Performed by: THORACIC SURGERY (CARDIOTHORACIC VASCULAR SURGERY)

## 2019-04-26 PROCEDURE — 99223 1ST HOSP IP/OBS HIGH 75: CPT | Performed by: NURSE PRACTITIONER

## 2019-04-26 PROCEDURE — 86803 HEPATITIS C AB TEST: CPT | Performed by: NURSE PRACTITIONER

## 2019-04-26 PROCEDURE — 85025 COMPLETE CBC W/AUTO DIFF WBC: CPT | Performed by: NURSE PRACTITIONER

## 2019-04-26 RX ORDER — TRAMADOL HYDROCHLORIDE 50 MG/1
50 TABLET ORAL EVERY 6 HOURS PRN
Status: DISCONTINUED | OUTPATIENT
Start: 2019-04-26 | End: 2019-04-27 | Stop reason: HOSPADM

## 2019-04-26 RX ORDER — IBUPROFEN 200 MG
1200 TABLET ORAL EVERY 6 HOURS PRN
COMMUNITY
End: 2019-04-27 | Stop reason: HOSPADM

## 2019-04-26 RX ORDER — ONDANSETRON 2 MG/ML
4 INJECTION INTRAMUSCULAR; INTRAVENOUS EVERY 6 HOURS PRN
Status: DISCONTINUED | OUTPATIENT
Start: 2019-04-26 | End: 2019-04-27 | Stop reason: HOSPADM

## 2019-04-26 RX ORDER — ACETAMINOPHEN 325 MG/1
650 TABLET ORAL EVERY 6 HOURS PRN
Status: DISCONTINUED | OUTPATIENT
Start: 2019-04-26 | End: 2019-04-27 | Stop reason: HOSPADM

## 2019-04-26 RX ADMIN — TRAMADOL HYDROCHLORIDE 50 MG: 50 TABLET, COATED ORAL at 01:10

## 2019-04-26 RX ADMIN — TRAMADOL HYDROCHLORIDE 50 MG: 50 TABLET, COATED ORAL at 09:46

## 2019-04-26 RX ADMIN — MORPHINE SULFATE 2 MG: 2 INJECTION, SOLUTION INTRAMUSCULAR; INTRAVENOUS at 03:33

## 2019-04-26 RX ADMIN — MORPHINE SULFATE 2 MG: 2 INJECTION, SOLUTION INTRAMUSCULAR; INTRAVENOUS at 08:31

## 2019-04-26 RX ADMIN — MORPHINE SULFATE 2 MG: 2 INJECTION, SOLUTION INTRAMUSCULAR; INTRAVENOUS at 16:19

## 2019-04-26 RX ADMIN — MORPHINE SULFATE 2 MG: 2 INJECTION, SOLUTION INTRAMUSCULAR; INTRAVENOUS at 21:24

## 2019-04-27 ENCOUNTER — APPOINTMENT (INPATIENT)
Dept: RADIOLOGY | Facility: HOSPITAL | Age: 54
DRG: 201 | End: 2019-04-27
Payer: COMMERCIAL

## 2019-04-27 VITALS
DIASTOLIC BLOOD PRESSURE: 76 MMHG | TEMPERATURE: 97.6 F | WEIGHT: 231 LBS | HEART RATE: 76 BPM | OXYGEN SATURATION: 95 % | SYSTOLIC BLOOD PRESSURE: 121 MMHG | RESPIRATION RATE: 20 BRPM

## 2019-04-27 PROCEDURE — 71046 X-RAY EXAM CHEST 2 VIEWS: CPT

## 2019-04-27 PROCEDURE — 99238 HOSP IP/OBS DSCHRG MGMT 30/<: CPT | Performed by: FAMILY MEDICINE

## 2019-04-27 PROCEDURE — 99231 SBSQ HOSP IP/OBS SF/LOW 25: CPT | Performed by: THORACIC SURGERY (CARDIOTHORACIC VASCULAR SURGERY)

## 2019-04-27 RX ORDER — SENNOSIDES 8.6 MG
2 TABLET ORAL DAILY PRN
Qty: 120 EACH | Refills: 0 | Status: SHIPPED | OUTPATIENT
Start: 2019-04-27 | End: 2019-05-15

## 2019-04-27 RX ORDER — DOCUSATE SODIUM 100 MG/1
100 CAPSULE, LIQUID FILLED ORAL 2 TIMES DAILY
Qty: 30 CAPSULE | Refills: 0 | Status: SHIPPED | OUTPATIENT
Start: 2019-04-27 | End: 2019-05-15

## 2019-04-27 RX ORDER — LACTULOSE 20 G/30ML
20 SOLUTION ORAL DAILY
Qty: 150 ML | Refills: 0 | Status: SHIPPED | OUTPATIENT
Start: 2019-04-28 | End: 2019-05-15

## 2019-04-27 RX ORDER — POLYETHYLENE GLYCOL 3350 17 G/17G
17 POWDER, FOR SOLUTION ORAL DAILY
Qty: 14 EACH | Refills: 0 | Status: SHIPPED | OUTPATIENT
Start: 2019-04-27 | End: 2019-05-15

## 2019-04-27 RX ORDER — POLYETHYLENE GLYCOL 3350 17 G/17G
17 POWDER, FOR SOLUTION ORAL DAILY
Status: DISCONTINUED | OUTPATIENT
Start: 2019-04-27 | End: 2019-04-27 | Stop reason: HOSPADM

## 2019-04-27 RX ORDER — TRAMADOL HYDROCHLORIDE 50 MG/1
50 TABLET ORAL EVERY 6 HOURS PRN
Qty: 30 TABLET | Refills: 0 | Status: SHIPPED | OUTPATIENT
Start: 2019-04-27 | End: 2019-05-07

## 2019-04-27 RX ORDER — DOCUSATE SODIUM 100 MG/1
100 CAPSULE, LIQUID FILLED ORAL 2 TIMES DAILY
Status: DISCONTINUED | OUTPATIENT
Start: 2019-04-27 | End: 2019-04-27 | Stop reason: HOSPADM

## 2019-04-27 RX ORDER — LACTULOSE 20 G/30ML
20 SOLUTION ORAL DAILY
Status: DISCONTINUED | OUTPATIENT
Start: 2019-04-27 | End: 2019-04-27 | Stop reason: HOSPADM

## 2019-04-27 RX ORDER — SENNOSIDES 8.6 MG
2 TABLET ORAL DAILY PRN
Status: DISCONTINUED | OUTPATIENT
Start: 2019-04-27 | End: 2019-04-27 | Stop reason: HOSPADM

## 2019-04-27 RX ORDER — ACETAMINOPHEN 325 MG/1
650 TABLET ORAL EVERY 6 HOURS PRN
Qty: 30 TABLET | Refills: 0 | Status: SHIPPED | OUTPATIENT
Start: 2019-04-27 | End: 2019-05-15

## 2019-04-27 RX ADMIN — MORPHINE SULFATE 2 MG: 2 INJECTION, SOLUTION INTRAMUSCULAR; INTRAVENOUS at 06:02

## 2019-04-27 RX ADMIN — TRAMADOL HYDROCHLORIDE 50 MG: 50 TABLET, COATED ORAL at 08:43

## 2019-04-28 LAB
ATRIAL RATE: 78 BPM
ATRIAL RATE: 90 BPM
P AXIS: 55 DEGREES
P AXIS: 73 DEGREES
PR INTERVAL: 144 MS
PR INTERVAL: 148 MS
QRS AXIS: 79 DEGREES
QRS AXIS: 80 DEGREES
QRSD INTERVAL: 76 MS
QRSD INTERVAL: 88 MS
QT INTERVAL: 340 MS
QT INTERVAL: 348 MS
QTC INTERVAL: 396 MS
QTC INTERVAL: 415 MS
T WAVE AXIS: 73 DEGREES
T WAVE AXIS: 74 DEGREES
VENTRICULAR RATE: 78 BPM
VENTRICULAR RATE: 90 BPM

## 2019-04-28 PROCEDURE — 93010 ELECTROCARDIOGRAM REPORT: CPT | Performed by: INTERNAL MEDICINE

## 2019-05-03 DIAGNOSIS — J93.11 PRIMARY SPONTANEOUS PNEUMOTHORAX: Primary | ICD-10-CM

## 2019-05-08 ENCOUNTER — OFFICE VISIT (OUTPATIENT)
Dept: CARDIAC SURGERY | Facility: CLINIC | Age: 54
End: 2019-05-08
Payer: COMMERCIAL

## 2019-05-08 ENCOUNTER — APPOINTMENT (OUTPATIENT)
Dept: RADIOLOGY | Age: 54
End: 2019-05-08
Payer: COMMERCIAL

## 2019-05-08 VITALS
HEIGHT: 67 IN | DIASTOLIC BLOOD PRESSURE: 66 MMHG | TEMPERATURE: 97.9 F | HEART RATE: 81 BPM | WEIGHT: 230.6 LBS | BODY MASS INDEX: 36.19 KG/M2 | OXYGEN SATURATION: 96 % | SYSTOLIC BLOOD PRESSURE: 124 MMHG

## 2019-05-08 DIAGNOSIS — J93.11 PRIMARY SPONTANEOUS PNEUMOTHORAX: ICD-10-CM

## 2019-05-08 DIAGNOSIS — J93.83 RECURRENT SPONTANEOUS PNEUMOTHORAX: Primary | ICD-10-CM

## 2019-05-08 PROCEDURE — 71046 X-RAY EXAM CHEST 2 VIEWS: CPT

## 2019-05-08 PROCEDURE — 99214 OFFICE O/P EST MOD 30 MIN: CPT | Performed by: THORACIC SURGERY (CARDIOTHORACIC VASCULAR SURGERY)

## 2019-05-08 RX ORDER — CEFAZOLIN SODIUM 2 G/50ML
2000 SOLUTION INTRAVENOUS ONCE
Status: CANCELLED | OUTPATIENT
Start: 2019-05-08 | End: 2019-05-08

## 2019-05-15 RX ORDER — TRAMADOL HYDROCHLORIDE 50 MG/1
50 TABLET ORAL EVERY 6 HOURS PRN
COMMUNITY

## 2019-05-27 ENCOUNTER — ANESTHESIA EVENT (OUTPATIENT)
Dept: PERIOP | Facility: HOSPITAL | Age: 54
DRG: 165 | End: 2019-05-27
Payer: COMMERCIAL

## 2019-05-28 ENCOUNTER — HOSPITAL ENCOUNTER (INPATIENT)
Facility: HOSPITAL | Age: 54
LOS: 3 days | Discharge: HOME | DRG: 165 | End: 2019-05-31
Attending: THORACIC SURGERY (CARDIOTHORACIC VASCULAR SURGERY) | Admitting: THORACIC SURGERY (CARDIOTHORACIC VASCULAR SURGERY)
Payer: COMMERCIAL

## 2019-05-28 ENCOUNTER — APPOINTMENT (INPATIENT)
Dept: RADIOLOGY | Facility: HOSPITAL | Age: 54
DRG: 165 | End: 2019-05-28
Payer: COMMERCIAL

## 2019-05-28 ENCOUNTER — ANESTHESIA (OUTPATIENT)
Dept: PERIOP | Facility: HOSPITAL | Age: 54
DRG: 165 | End: 2019-05-28
Payer: COMMERCIAL

## 2019-05-28 DIAGNOSIS — J93.83 RECURRENT SPONTANEOUS PNEUMOTHORAX: ICD-10-CM

## 2019-05-28 LAB
ABO GROUP BLD: NORMAL
ANION GAP SERPL CALCULATED.3IONS-SCNC: 9 MMOL/L (ref 4–13)
BLD GP AB SCN SERPL QL: NEGATIVE
BUN SERPL-MCNC: 13 MG/DL (ref 5–25)
CALCIUM SERPL-MCNC: 7.9 MG/DL (ref 8.3–10.1)
CHLORIDE SERPL-SCNC: 106 MMOL/L (ref 100–108)
CO2 SERPL-SCNC: 26 MMOL/L (ref 21–32)
CREAT SERPL-MCNC: 1.09 MG/DL (ref 0.6–1.3)
ERYTHROCYTE [DISTWIDTH] IN BLOOD BY AUTOMATED COUNT: 13 % (ref 11.6–15.1)
GFR SERPL CREATININE-BSD FRML MDRD: 77 ML/MIN/1.73SQ M
GLUCOSE SERPL-MCNC: 187 MG/DL (ref 65–140)
HCT VFR BLD AUTO: 38.3 % (ref 36.5–49.3)
HGB BLD-MCNC: 12.9 G/DL (ref 12–17)
MAGNESIUM SERPL-MCNC: 1.5 MG/DL (ref 1.6–2.6)
MCH RBC QN AUTO: 30.5 PG (ref 26.8–34.3)
MCHC RBC AUTO-ENTMCNC: 33.7 G/DL (ref 31.4–37.4)
MCV RBC AUTO: 91 FL (ref 82–98)
PLATELET # BLD AUTO: 136 THOUSANDS/UL (ref 149–390)
PMV BLD AUTO: 8.9 FL (ref 8.9–12.7)
POTASSIUM SERPL-SCNC: 4.5 MMOL/L (ref 3.5–5.3)
RBC # BLD AUTO: 4.23 MILLION/UL (ref 3.88–5.62)
RH BLD: POSITIVE
SODIUM SERPL-SCNC: 141 MMOL/L (ref 136–145)
SPECIMEN EXPIRATION DATE: NORMAL
WBC # BLD AUTO: 11.51 THOUSAND/UL (ref 4.31–10.16)

## 2019-05-28 PROCEDURE — C9290 INJ, BUPIVACAINE LIPOSOME: HCPCS | Performed by: THORACIC SURGERY (CARDIOTHORACIC VASCULAR SURGERY)

## 2019-05-28 PROCEDURE — 94760 N-INVAS EAR/PLS OXIMETRY 1: CPT

## 2019-05-28 PROCEDURE — 71045 X-RAY EXAM CHEST 1 VIEW: CPT

## 2019-05-28 PROCEDURE — 88307 TISSUE EXAM BY PATHOLOGIST: CPT | Performed by: PATHOLOGY

## 2019-05-28 PROCEDURE — 85027 COMPLETE CBC AUTOMATED: CPT | Performed by: PHYSICIAN ASSISTANT

## 2019-05-28 PROCEDURE — 86901 BLOOD TYPING SEROLOGIC RH(D): CPT | Performed by: THORACIC SURGERY (CARDIOTHORACIC VASCULAR SURGERY)

## 2019-05-28 PROCEDURE — 0BJ08ZZ INSPECTION OF TRACHEOBRONCHIAL TREE, VIA NATURAL OR ARTIFICIAL OPENING ENDOSCOPIC: ICD-10-PCS | Performed by: THORACIC SURGERY (CARDIOTHORACIC VASCULAR SURGERY)

## 2019-05-28 PROCEDURE — 32655 THORACOSCOPY RESECT BULLAE: CPT | Performed by: THORACIC SURGERY (CARDIOTHORACIC VASCULAR SURGERY)

## 2019-05-28 PROCEDURE — 0B5P4ZZ DESTRUCTION OF LEFT PLEURA, PERCUTANEOUS ENDOSCOPIC APPROACH: ICD-10-PCS | Performed by: THORACIC SURGERY (CARDIOTHORACIC VASCULAR SURGERY)

## 2019-05-28 PROCEDURE — 86850 RBC ANTIBODY SCREEN: CPT | Performed by: THORACIC SURGERY (CARDIOTHORACIC VASCULAR SURGERY)

## 2019-05-28 PROCEDURE — 80048 BASIC METABOLIC PNL TOTAL CA: CPT | Performed by: PHYSICIAN ASSISTANT

## 2019-05-28 PROCEDURE — 0BBG4ZX EXCISION OF LEFT UPPER LUNG LOBE, PERCUTANEOUS ENDOSCOPIC APPROACH, DIAGNOSTIC: ICD-10-PCS | Performed by: THORACIC SURGERY (CARDIOTHORACIC VASCULAR SURGERY)

## 2019-05-28 PROCEDURE — 86900 BLOOD TYPING SEROLOGIC ABO: CPT | Performed by: THORACIC SURGERY (CARDIOTHORACIC VASCULAR SURGERY)

## 2019-05-28 PROCEDURE — 83735 ASSAY OF MAGNESIUM: CPT | Performed by: PHYSICIAN ASSISTANT

## 2019-05-28 RX ORDER — TRAMADOL HYDROCHLORIDE 50 MG/1
50 TABLET ORAL EVERY 6 HOURS PRN
Status: DISCONTINUED | OUTPATIENT
Start: 2019-05-28 | End: 2019-05-29

## 2019-05-28 RX ORDER — FENTANYL CITRATE 50 UG/ML
INJECTION, SOLUTION INTRAMUSCULAR; INTRAVENOUS AS NEEDED
Status: DISCONTINUED | OUTPATIENT
Start: 2019-05-28 | End: 2019-05-28 | Stop reason: SURG

## 2019-05-28 RX ORDER — MIDAZOLAM HYDROCHLORIDE 1 MG/ML
INJECTION INTRAMUSCULAR; INTRAVENOUS AS NEEDED
Status: DISCONTINUED | OUTPATIENT
Start: 2019-05-28 | End: 2019-05-28 | Stop reason: SURG

## 2019-05-28 RX ORDER — DOCUSATE SODIUM 100 MG/1
100 CAPSULE, LIQUID FILLED ORAL 2 TIMES DAILY
Status: DISCONTINUED | OUTPATIENT
Start: 2019-05-28 | End: 2019-05-31 | Stop reason: HOSPADM

## 2019-05-28 RX ORDER — LIDOCAINE HYDROCHLORIDE 10 MG/ML
0.5 INJECTION, SOLUTION EPIDURAL; INFILTRATION; INTRACAUDAL; PERINEURAL ONCE AS NEEDED
Status: COMPLETED | OUTPATIENT
Start: 2019-05-28 | End: 2019-05-28

## 2019-05-28 RX ORDER — PROPOFOL 10 MG/ML
INJECTION, EMULSION INTRAVENOUS AS NEEDED
Status: DISCONTINUED | OUTPATIENT
Start: 2019-05-28 | End: 2019-05-28 | Stop reason: SURG

## 2019-05-28 RX ORDER — ONDANSETRON 2 MG/ML
4 INJECTION INTRAMUSCULAR; INTRAVENOUS ONCE AS NEEDED
Status: DISCONTINUED | OUTPATIENT
Start: 2019-05-28 | End: 2019-05-28 | Stop reason: HOSPADM

## 2019-05-28 RX ORDER — KETAMINE HCL IN NACL, ISO-OSM 100MG/10ML
SYRINGE (ML) INJECTION AS NEEDED
Status: DISCONTINUED | OUTPATIENT
Start: 2019-05-28 | End: 2019-05-28 | Stop reason: SURG

## 2019-05-28 RX ORDER — CEFAZOLIN SODIUM 2 G/50ML
2000 SOLUTION INTRAVENOUS ONCE
Status: COMPLETED | OUTPATIENT
Start: 2019-05-28 | End: 2019-05-28

## 2019-05-28 RX ORDER — PANTOPRAZOLE SODIUM 40 MG/1
40 TABLET, DELAYED RELEASE ORAL
Status: DISCONTINUED | OUTPATIENT
Start: 2019-05-29 | End: 2019-05-31 | Stop reason: HOSPADM

## 2019-05-28 RX ORDER — ONDANSETRON 2 MG/ML
INJECTION INTRAMUSCULAR; INTRAVENOUS AS NEEDED
Status: DISCONTINUED | OUTPATIENT
Start: 2019-05-28 | End: 2019-05-28 | Stop reason: SURG

## 2019-05-28 RX ORDER — SODIUM CHLORIDE, SODIUM LACTATE, POTASSIUM CHLORIDE, CALCIUM CHLORIDE 600; 310; 30; 20 MG/100ML; MG/100ML; MG/100ML; MG/100ML
50 INJECTION, SOLUTION INTRAVENOUS CONTINUOUS
Status: DISCONTINUED | OUTPATIENT
Start: 2019-05-28 | End: 2019-05-28

## 2019-05-28 RX ORDER — ACETAMINOPHEN 325 MG/1
975 TABLET ORAL EVERY 6 HOURS
Status: DISCONTINUED | OUTPATIENT
Start: 2019-05-28 | End: 2019-05-31 | Stop reason: HOSPADM

## 2019-05-28 RX ORDER — HYDROMORPHONE HCL/PF 1 MG/ML
SYRINGE (ML) INJECTION AS NEEDED
Status: DISCONTINUED | OUTPATIENT
Start: 2019-05-28 | End: 2019-05-28 | Stop reason: SURG

## 2019-05-28 RX ORDER — OXYCODONE HYDROCHLORIDE 5 MG/1
5 TABLET ORAL EVERY 4 HOURS PRN
Status: DISCONTINUED | OUTPATIENT
Start: 2019-05-28 | End: 2019-05-29

## 2019-05-28 RX ORDER — GLYCOPYRROLATE 0.2 MG/ML
INJECTION INTRAMUSCULAR; INTRAVENOUS AS NEEDED
Status: DISCONTINUED | OUTPATIENT
Start: 2019-05-28 | End: 2019-05-28 | Stop reason: SURG

## 2019-05-28 RX ORDER — FENTANYL CITRATE/PF 50 MCG/ML
50 SYRINGE (ML) INJECTION
Status: COMPLETED | OUTPATIENT
Start: 2019-05-28 | End: 2019-05-28

## 2019-05-28 RX ORDER — GABAPENTIN 300 MG/1
300 CAPSULE ORAL 3 TIMES DAILY
Status: DISCONTINUED | OUTPATIENT
Start: 2019-05-28 | End: 2019-05-31 | Stop reason: HOSPADM

## 2019-05-28 RX ORDER — HYDROMORPHONE HCL/PF 1 MG/ML
0.5 SYRINGE (ML) INJECTION EVERY 4 HOURS PRN
Status: DISCONTINUED | OUTPATIENT
Start: 2019-05-28 | End: 2019-05-31 | Stop reason: HOSPADM

## 2019-05-28 RX ORDER — DIPHENHYDRAMINE HYDROCHLORIDE 50 MG/ML
12.5 INJECTION INTRAMUSCULAR; INTRAVENOUS ONCE AS NEEDED
Status: DISCONTINUED | OUTPATIENT
Start: 2019-05-28 | End: 2019-05-28 | Stop reason: HOSPADM

## 2019-05-28 RX ORDER — SENNOSIDES 8.6 MG
1 TABLET ORAL DAILY
Status: DISCONTINUED | OUTPATIENT
Start: 2019-05-28 | End: 2019-05-31 | Stop reason: HOSPADM

## 2019-05-28 RX ORDER — ROCURONIUM BROMIDE 10 MG/ML
INJECTION, SOLUTION INTRAVENOUS AS NEEDED
Status: DISCONTINUED | OUTPATIENT
Start: 2019-05-28 | End: 2019-05-28 | Stop reason: SURG

## 2019-05-28 RX ORDER — POLYETHYLENE GLYCOL 3350 17 G/17G
17 POWDER, FOR SOLUTION ORAL DAILY
Status: DISCONTINUED | OUTPATIENT
Start: 2019-05-28 | End: 2019-05-31 | Stop reason: HOSPADM

## 2019-05-28 RX ORDER — DEXAMETHASONE SODIUM PHOSPHATE 10 MG/ML
INJECTION, SOLUTION INTRAMUSCULAR; INTRAVENOUS AS NEEDED
Status: DISCONTINUED | OUTPATIENT
Start: 2019-05-28 | End: 2019-05-28 | Stop reason: SURG

## 2019-05-28 RX ORDER — BUPIVACAINE HYDROCHLORIDE 2.5 MG/ML
INJECTION, SOLUTION INFILTRATION; PERINEURAL AS NEEDED
Status: DISCONTINUED | OUTPATIENT
Start: 2019-05-28 | End: 2019-05-28 | Stop reason: HOSPADM

## 2019-05-28 RX ORDER — LIDOCAINE HYDROCHLORIDE 10 MG/ML
INJECTION, SOLUTION INFILTRATION; PERINEURAL AS NEEDED
Status: DISCONTINUED | OUTPATIENT
Start: 2019-05-28 | End: 2019-05-28 | Stop reason: SURG

## 2019-05-28 RX ORDER — 0.9 % SODIUM CHLORIDE 0.9 %
VIAL (ML) INJECTION AS NEEDED
Status: DISCONTINUED | OUTPATIENT
Start: 2019-05-28 | End: 2019-05-28 | Stop reason: HOSPADM

## 2019-05-28 RX ORDER — NEOSTIGMINE METHYLSULFATE 1 MG/ML
INJECTION INTRAVENOUS AS NEEDED
Status: DISCONTINUED | OUTPATIENT
Start: 2019-05-28 | End: 2019-05-28 | Stop reason: SURG

## 2019-05-28 RX ORDER — HYDROMORPHONE HCL/PF 1 MG/ML
0.5 SYRINGE (ML) INJECTION
Status: COMPLETED | OUTPATIENT
Start: 2019-05-28 | End: 2019-05-28

## 2019-05-28 RX ORDER — SODIUM CHLORIDE, SODIUM LACTATE, POTASSIUM CHLORIDE, CALCIUM CHLORIDE 600; 310; 30; 20 MG/100ML; MG/100ML; MG/100ML; MG/100ML
60 INJECTION, SOLUTION INTRAVENOUS CONTINUOUS
Status: DISCONTINUED | OUTPATIENT
Start: 2019-05-28 | End: 2019-05-29

## 2019-05-28 RX ORDER — ONDANSETRON 2 MG/ML
4 INJECTION INTRAMUSCULAR; INTRAVENOUS EVERY 6 HOURS PRN
Status: DISCONTINUED | OUTPATIENT
Start: 2019-05-28 | End: 2019-05-31 | Stop reason: HOSPADM

## 2019-05-28 RX ADMIN — SENNOSIDES 8.6 MG: 8.6 TABLET, FILM COATED ORAL at 17:42

## 2019-05-28 RX ADMIN — FENTANYL CITRATE 100 MCG: 50 INJECTION, SOLUTION INTRAMUSCULAR; INTRAVENOUS at 11:57

## 2019-05-28 RX ADMIN — ONDANSETRON 4 MG: 2 INJECTION INTRAMUSCULAR; INTRAVENOUS at 13:25

## 2019-05-28 RX ADMIN — DOCUSATE SODIUM 100 MG: 100 CAPSULE, LIQUID FILLED ORAL at 17:42

## 2019-05-28 RX ADMIN — POLYETHYLENE GLYCOL 3350 17 G: 17 POWDER, FOR SOLUTION ORAL at 17:42

## 2019-05-28 RX ADMIN — HYDROMORPHONE HYDROCHLORIDE 0.5 MG: 1 INJECTION, SOLUTION INTRAMUSCULAR; INTRAVENOUS; SUBCUTANEOUS at 14:59

## 2019-05-28 RX ADMIN — ACETAMINOPHEN 975 MG: 325 TABLET ORAL at 21:26

## 2019-05-28 RX ADMIN — HYDROMORPHONE HYDROCHLORIDE 1 MG: 1 INJECTION, SOLUTION INTRAMUSCULAR; INTRAVENOUS; SUBCUTANEOUS at 12:18

## 2019-05-28 RX ADMIN — FENTANYL CITRATE 50 MCG: 50 INJECTION, SOLUTION INTRAMUSCULAR; INTRAVENOUS at 14:08

## 2019-05-28 RX ADMIN — ACETAMINOPHEN 975 MG: 325 TABLET ORAL at 16:37

## 2019-05-28 RX ADMIN — LIDOCAINE HYDROCHLORIDE 50 MG: 10 INJECTION, SOLUTION INFILTRATION; PERINEURAL at 11:57

## 2019-05-28 RX ADMIN — GABAPENTIN 300 MG: 300 CAPSULE ORAL at 16:37

## 2019-05-28 RX ADMIN — OXYCODONE HYDROCHLORIDE 5 MG: 5 TABLET ORAL at 16:37

## 2019-05-28 RX ADMIN — GABAPENTIN 300 MG: 300 CAPSULE ORAL at 21:26

## 2019-05-28 RX ADMIN — SODIUM CHLORIDE, SODIUM LACTATE, POTASSIUM CHLORIDE, AND CALCIUM CHLORIDE 50 ML/HR: .6; .31; .03; .02 INJECTION, SOLUTION INTRAVENOUS at 11:45

## 2019-05-28 RX ADMIN — ROCURONIUM BROMIDE 50 MG: 10 INJECTION, SOLUTION INTRAVENOUS at 11:57

## 2019-05-28 RX ADMIN — Medication 50 MG: at 12:05

## 2019-05-28 RX ADMIN — PROPOFOL 200 MG: 10 INJECTION, EMULSION INTRAVENOUS at 11:57

## 2019-05-28 RX ADMIN — ENOXAPARIN SODIUM 40 MG: 40 INJECTION SUBCUTANEOUS at 21:26

## 2019-05-28 RX ADMIN — FENTANYL CITRATE 50 MCG: 50 INJECTION, SOLUTION INTRAMUSCULAR; INTRAVENOUS at 13:58

## 2019-05-28 RX ADMIN — FENTANYL CITRATE 50 MCG: 50 INJECTION, SOLUTION INTRAMUSCULAR; INTRAVENOUS at 14:18

## 2019-05-28 RX ADMIN — CEFAZOLIN SODIUM 2000 MG: 2 SOLUTION INTRAVENOUS at 12:22

## 2019-05-28 RX ADMIN — SODIUM CHLORIDE, SODIUM LACTATE, POTASSIUM CHLORIDE, AND CALCIUM CHLORIDE 60 ML/HR: .6; .31; .03; .02 INJECTION, SOLUTION INTRAVENOUS at 15:00

## 2019-05-28 RX ADMIN — FENTANYL CITRATE 50 MCG: 50 INJECTION, SOLUTION INTRAMUSCULAR; INTRAVENOUS at 14:28

## 2019-05-28 RX ADMIN — ROCURONIUM BROMIDE 20 MG: 10 INJECTION, SOLUTION INTRAVENOUS at 12:32

## 2019-05-28 RX ADMIN — MIDAZOLAM 2 MG: 1 INJECTION INTRAMUSCULAR; INTRAVENOUS at 11:52

## 2019-05-28 RX ADMIN — NEOSTIGMINE METHYLSULFATE 3 MG: 1 INJECTION, SOLUTION INTRAVENOUS at 13:25

## 2019-05-28 RX ADMIN — DEXAMETHASONE SODIUM PHOSPHATE 5 MG: 10 INJECTION, SOLUTION INTRAMUSCULAR; INTRAVENOUS at 12:50

## 2019-05-28 RX ADMIN — HYDROMORPHONE HYDROCHLORIDE 0.5 MG: 1 INJECTION, SOLUTION INTRAMUSCULAR; INTRAVENOUS; SUBCUTANEOUS at 14:34

## 2019-05-28 RX ADMIN — LIDOCAINE HYDROCHLORIDE 0.5 ML: 10 INJECTION, SOLUTION EPIDURAL; INFILTRATION; INTRACAUDAL; PERINEURAL at 11:45

## 2019-05-28 RX ADMIN — GLYCOPYRROLATE 0.4 MG: 0.2 INJECTION, SOLUTION INTRAMUSCULAR; INTRAVENOUS at 13:25

## 2019-05-28 RX ADMIN — SODIUM CHLORIDE, SODIUM LACTATE, POTASSIUM CHLORIDE, AND CALCIUM CHLORIDE 60 ML/HR: .6; .31; .03; .02 INJECTION, SOLUTION INTRAVENOUS at 20:03

## 2019-05-29 LAB
ANION GAP SERPL CALCULATED.3IONS-SCNC: 8 MMOL/L (ref 4–13)
BUN SERPL-MCNC: 13 MG/DL (ref 5–25)
CALCIUM SERPL-MCNC: 8.3 MG/DL (ref 8.3–10.1)
CHLORIDE SERPL-SCNC: 105 MMOL/L (ref 100–108)
CO2 SERPL-SCNC: 26 MMOL/L (ref 21–32)
CREAT SERPL-MCNC: 1.02 MG/DL (ref 0.6–1.3)
ERYTHROCYTE [DISTWIDTH] IN BLOOD BY AUTOMATED COUNT: 12.7 % (ref 11.6–15.1)
GFR SERPL CREATININE-BSD FRML MDRD: 84 ML/MIN/1.73SQ M
GLUCOSE SERPL-MCNC: 157 MG/DL (ref 65–140)
HCT VFR BLD AUTO: 39.6 % (ref 36.5–49.3)
HGB BLD-MCNC: 13.6 G/DL (ref 12–17)
MAGNESIUM SERPL-MCNC: 2 MG/DL (ref 1.6–2.6)
MCH RBC QN AUTO: 30.4 PG (ref 26.8–34.3)
MCHC RBC AUTO-ENTMCNC: 34.3 G/DL (ref 31.4–37.4)
MCV RBC AUTO: 89 FL (ref 82–98)
PLATELET # BLD AUTO: 167 THOUSANDS/UL (ref 149–390)
PMV BLD AUTO: 9.3 FL (ref 8.9–12.7)
POTASSIUM SERPL-SCNC: 4.1 MMOL/L (ref 3.5–5.3)
RBC # BLD AUTO: 4.47 MILLION/UL (ref 3.88–5.62)
SODIUM SERPL-SCNC: 139 MMOL/L (ref 136–145)
WBC # BLD AUTO: 15.59 THOUSAND/UL (ref 4.31–10.16)

## 2019-05-29 PROCEDURE — 85027 COMPLETE CBC AUTOMATED: CPT | Performed by: PHYSICIAN ASSISTANT

## 2019-05-29 PROCEDURE — 80048 BASIC METABOLIC PNL TOTAL CA: CPT | Performed by: PHYSICIAN ASSISTANT

## 2019-05-29 PROCEDURE — 99024 POSTOP FOLLOW-UP VISIT: CPT | Performed by: THORACIC SURGERY (CARDIOTHORACIC VASCULAR SURGERY)

## 2019-05-29 PROCEDURE — 94760 N-INVAS EAR/PLS OXIMETRY 1: CPT

## 2019-05-29 PROCEDURE — 83735 ASSAY OF MAGNESIUM: CPT | Performed by: PHYSICIAN ASSISTANT

## 2019-05-29 PROCEDURE — 94660 CPAP INITIATION&MGMT: CPT

## 2019-05-29 RX ORDER — OXYCODONE HYDROCHLORIDE 10 MG/1
10 TABLET ORAL EVERY 4 HOURS PRN
Status: DISCONTINUED | OUTPATIENT
Start: 2019-05-29 | End: 2019-05-31 | Stop reason: HOSPADM

## 2019-05-29 RX ORDER — LIDOCAINE 50 MG/G
1 PATCH TOPICAL DAILY
Status: DISCONTINUED | OUTPATIENT
Start: 2019-05-29 | End: 2019-05-31 | Stop reason: HOSPADM

## 2019-05-29 RX ORDER — OXYCODONE HYDROCHLORIDE 5 MG/1
5 TABLET ORAL EVERY 4 HOURS PRN
Status: DISCONTINUED | OUTPATIENT
Start: 2019-05-29 | End: 2019-05-31 | Stop reason: HOSPADM

## 2019-05-29 RX ADMIN — ACETAMINOPHEN 975 MG: 325 TABLET ORAL at 15:21

## 2019-05-29 RX ADMIN — DOCUSATE SODIUM 100 MG: 100 CAPSULE, LIQUID FILLED ORAL at 17:14

## 2019-05-29 RX ADMIN — ACETAMINOPHEN 975 MG: 325 TABLET ORAL at 22:09

## 2019-05-29 RX ADMIN — ENOXAPARIN SODIUM 40 MG: 40 INJECTION SUBCUTANEOUS at 08:37

## 2019-05-29 RX ADMIN — OXYCODONE HYDROCHLORIDE 5 MG: 5 TABLET ORAL at 07:40

## 2019-05-29 RX ADMIN — OXYCODONE HYDROCHLORIDE 10 MG: 10 TABLET ORAL at 13:42

## 2019-05-29 RX ADMIN — SENNOSIDES 8.6 MG: 8.6 TABLET, FILM COATED ORAL at 08:37

## 2019-05-29 RX ADMIN — DOCUSATE SODIUM 100 MG: 100 CAPSULE, LIQUID FILLED ORAL at 08:36

## 2019-05-29 RX ADMIN — GABAPENTIN 300 MG: 300 CAPSULE ORAL at 08:37

## 2019-05-29 RX ADMIN — LIDOCAINE 1 PATCH: 50 PATCH TOPICAL at 08:37

## 2019-05-29 RX ADMIN — GABAPENTIN 300 MG: 300 CAPSULE ORAL at 20:10

## 2019-05-29 RX ADMIN — POLYETHYLENE GLYCOL 3350 17 G: 17 POWDER, FOR SOLUTION ORAL at 08:38

## 2019-05-29 RX ADMIN — OXYCODONE HYDROCHLORIDE 5 MG: 5 TABLET ORAL at 01:44

## 2019-05-29 RX ADMIN — PANTOPRAZOLE SODIUM 40 MG: 40 TABLET, DELAYED RELEASE ORAL at 05:35

## 2019-05-29 RX ADMIN — GABAPENTIN 300 MG: 300 CAPSULE ORAL at 15:21

## 2019-05-29 RX ADMIN — ACETAMINOPHEN 975 MG: 325 TABLET ORAL at 05:35

## 2019-05-29 RX ADMIN — OXYCODONE HYDROCHLORIDE 10 MG: 10 TABLET ORAL at 20:24

## 2019-05-30 PROCEDURE — 94760 N-INVAS EAR/PLS OXIMETRY 1: CPT

## 2019-05-30 PROCEDURE — 94660 CPAP INITIATION&MGMT: CPT

## 2019-05-30 PROCEDURE — 99024 POSTOP FOLLOW-UP VISIT: CPT | Performed by: THORACIC SURGERY (CARDIOTHORACIC VASCULAR SURGERY)

## 2019-05-30 RX ADMIN — SENNOSIDES 8.6 MG: 8.6 TABLET, FILM COATED ORAL at 09:23

## 2019-05-30 RX ADMIN — PANTOPRAZOLE SODIUM 40 MG: 40 TABLET, DELAYED RELEASE ORAL at 06:07

## 2019-05-30 RX ADMIN — ACETAMINOPHEN 975 MG: 325 TABLET ORAL at 15:48

## 2019-05-30 RX ADMIN — DOCUSATE SODIUM 100 MG: 100 CAPSULE, LIQUID FILLED ORAL at 09:23

## 2019-05-30 RX ADMIN — DOCUSATE SODIUM 100 MG: 100 CAPSULE, LIQUID FILLED ORAL at 17:11

## 2019-05-30 RX ADMIN — OXYCODONE HYDROCHLORIDE 10 MG: 10 TABLET ORAL at 21:19

## 2019-05-30 RX ADMIN — ACETAMINOPHEN 975 MG: 325 TABLET ORAL at 09:23

## 2019-05-30 RX ADMIN — POLYETHYLENE GLYCOL 3350 17 G: 17 POWDER, FOR SOLUTION ORAL at 09:23

## 2019-05-30 RX ADMIN — ENOXAPARIN SODIUM 40 MG: 40 INJECTION SUBCUTANEOUS at 09:23

## 2019-05-30 RX ADMIN — ACETAMINOPHEN 975 MG: 325 TABLET ORAL at 04:45

## 2019-05-30 RX ADMIN — GABAPENTIN 300 MG: 300 CAPSULE ORAL at 09:23

## 2019-05-30 RX ADMIN — LIDOCAINE 1 PATCH: 50 PATCH TOPICAL at 09:23

## 2019-05-30 RX ADMIN — GABAPENTIN 300 MG: 300 CAPSULE ORAL at 15:49

## 2019-05-30 RX ADMIN — ACETAMINOPHEN 975 MG: 325 TABLET ORAL at 21:18

## 2019-05-30 RX ADMIN — OXYCODONE HYDROCHLORIDE 10 MG: 10 TABLET ORAL at 06:07

## 2019-05-30 RX ADMIN — OXYCODONE HYDROCHLORIDE 10 MG: 10 TABLET ORAL at 17:12

## 2019-05-30 RX ADMIN — GABAPENTIN 300 MG: 300 CAPSULE ORAL at 21:19

## 2019-05-30 RX ADMIN — OXYCODONE HYDROCHLORIDE 10 MG: 10 TABLET ORAL at 10:18

## 2019-05-31 ENCOUNTER — APPOINTMENT (INPATIENT)
Dept: RADIOLOGY | Facility: HOSPITAL | Age: 54
DRG: 165 | End: 2019-05-31
Payer: COMMERCIAL

## 2019-05-31 VITALS
OXYGEN SATURATION: 95 % | TEMPERATURE: 97.9 F | HEART RATE: 72 BPM | HEIGHT: 67 IN | WEIGHT: 231.04 LBS | BODY MASS INDEX: 36.26 KG/M2 | RESPIRATION RATE: 18 BRPM | DIASTOLIC BLOOD PRESSURE: 60 MMHG | SYSTOLIC BLOOD PRESSURE: 114 MMHG

## 2019-05-31 PROCEDURE — NC001 PR NO CHARGE: Performed by: THORACIC SURGERY (CARDIOTHORACIC VASCULAR SURGERY)

## 2019-05-31 PROCEDURE — 99024 POSTOP FOLLOW-UP VISIT: CPT | Performed by: THORACIC SURGERY (CARDIOTHORACIC VASCULAR SURGERY)

## 2019-05-31 PROCEDURE — 71045 X-RAY EXAM CHEST 1 VIEW: CPT

## 2019-05-31 PROCEDURE — 71046 X-RAY EXAM CHEST 2 VIEWS: CPT

## 2019-05-31 RX ORDER — OXYCODONE HYDROCHLORIDE 5 MG/1
5 TABLET ORAL EVERY 4 HOURS PRN
Qty: 30 TABLET | Refills: 0 | Status: SHIPPED | OUTPATIENT
Start: 2019-05-31 | End: 2019-06-06 | Stop reason: SDUPTHER

## 2019-05-31 RX ORDER — GABAPENTIN 300 MG/1
300 CAPSULE ORAL 3 TIMES DAILY
Qty: 63 CAPSULE | Refills: 0 | Status: SHIPPED | OUTPATIENT
Start: 2019-05-31 | End: 2020-08-25

## 2019-05-31 RX ORDER — ACETAMINOPHEN 325 MG/1
650 TABLET ORAL EVERY 6 HOURS PRN
Qty: 30 TABLET | Refills: 0 | Status: SHIPPED | OUTPATIENT
Start: 2019-05-31

## 2019-05-31 RX ORDER — DOCUSATE SODIUM 100 MG/1
100 CAPSULE, LIQUID FILLED ORAL 2 TIMES DAILY
Qty: 10 CAPSULE | Refills: 0 | Status: SHIPPED | OUTPATIENT
Start: 2019-05-31

## 2019-05-31 RX ADMIN — ENOXAPARIN SODIUM 40 MG: 40 INJECTION SUBCUTANEOUS at 08:26

## 2019-05-31 RX ADMIN — SENNOSIDES 8.6 MG: 8.6 TABLET, FILM COATED ORAL at 08:26

## 2019-05-31 RX ADMIN — OXYCODONE HYDROCHLORIDE 10 MG: 10 TABLET ORAL at 05:45

## 2019-05-31 RX ADMIN — PANTOPRAZOLE SODIUM 40 MG: 40 TABLET, DELAYED RELEASE ORAL at 05:45

## 2019-05-31 RX ADMIN — ACETAMINOPHEN 975 MG: 325 TABLET ORAL at 09:28

## 2019-05-31 RX ADMIN — DOCUSATE SODIUM 100 MG: 100 CAPSULE, LIQUID FILLED ORAL at 08:26

## 2019-05-31 RX ADMIN — GABAPENTIN 300 MG: 300 CAPSULE ORAL at 08:26

## 2019-06-06 ENCOUNTER — TELEPHONE (OUTPATIENT)
Dept: CARDIAC SURGERY | Facility: CLINIC | Age: 54
End: 2019-06-06

## 2019-06-06 DIAGNOSIS — J93.83 SPONTANEOUS PNEUMOTHORAX: Primary | ICD-10-CM

## 2019-06-06 RX ORDER — OXYCODONE HYDROCHLORIDE 5 MG/1
5 TABLET ORAL EVERY 6 HOURS PRN
Qty: 15 TABLET | Refills: 0 | Status: SHIPPED | OUTPATIENT
Start: 2019-06-06 | End: 2019-06-14

## 2019-06-14 ENCOUNTER — APPOINTMENT (OUTPATIENT)
Dept: RADIOLOGY | Facility: MEDICAL CENTER | Age: 54
End: 2019-06-14
Payer: COMMERCIAL

## 2019-06-14 ENCOUNTER — OFFICE VISIT (OUTPATIENT)
Dept: CARDIAC SURGERY | Facility: MEDICAL CENTER | Age: 54
End: 2019-06-14

## 2019-06-14 VITALS
WEIGHT: 225 LBS | OXYGEN SATURATION: 96 % | TEMPERATURE: 97.2 F | BODY MASS INDEX: 35.31 KG/M2 | SYSTOLIC BLOOD PRESSURE: 112 MMHG | HEIGHT: 67 IN | HEART RATE: 80 BPM | DIASTOLIC BLOOD PRESSURE: 80 MMHG

## 2019-06-14 DIAGNOSIS — J93.83 RECURRENT SPONTANEOUS PNEUMOTHORAX: Primary | ICD-10-CM

## 2019-06-14 DIAGNOSIS — J93.83 RECURRENT SPONTANEOUS PNEUMOTHORAX: ICD-10-CM

## 2019-06-14 PROCEDURE — 71046 X-RAY EXAM CHEST 2 VIEWS: CPT

## 2019-06-14 PROCEDURE — 99024 POSTOP FOLLOW-UP VISIT: CPT | Performed by: THORACIC SURGERY (CARDIOTHORACIC VASCULAR SURGERY)

## 2019-07-12 ENCOUNTER — TELEPHONE (OUTPATIENT)
Dept: CARDIAC SURGERY | Facility: MEDICAL CENTER | Age: 54
End: 2019-07-12

## 2019-07-12 NOTE — TELEPHONE ENCOUNTER
I called and l/m for pt to c/b our office to reschedule missed appt  UPDATE: If pt call the office back to reschedule, pt does not need to f/u w/ our office if he is feeling well  He will only need to f/u if he is having any issues or concerns

## 2019-11-26 ENCOUNTER — APPOINTMENT (OUTPATIENT)
Dept: RADIOLOGY | Age: 54
End: 2019-11-26
Payer: COMMERCIAL

## 2019-11-26 DIAGNOSIS — J93.83 RECURRENT SPONTANEOUS PNEUMOTHORAX: ICD-10-CM

## 2019-11-26 PROCEDURE — 71046 X-RAY EXAM CHEST 2 VIEWS: CPT

## 2019-11-27 ENCOUNTER — OFFICE VISIT (OUTPATIENT)
Dept: CARDIAC SURGERY | Facility: CLINIC | Age: 54
End: 2019-11-27

## 2019-11-27 VITALS
SYSTOLIC BLOOD PRESSURE: 131 MMHG | WEIGHT: 234 LBS | OXYGEN SATURATION: 97 % | HEIGHT: 67 IN | BODY MASS INDEX: 36.73 KG/M2 | TEMPERATURE: 96.9 F | HEART RATE: 72 BPM | DIASTOLIC BLOOD PRESSURE: 81 MMHG

## 2019-11-27 DIAGNOSIS — Z87.09 HISTORY OF PNEUMOTHORAX: Primary | ICD-10-CM

## 2019-11-27 DIAGNOSIS — R06.02 SHORTNESS OF BREATH: ICD-10-CM

## 2019-11-27 PROCEDURE — 99213 OFFICE O/P EST LOW 20 MIN: CPT | Performed by: PHYSICIAN ASSISTANT

## 2019-11-27 RX ORDER — IBUPROFEN 200 MG
800 TABLET ORAL EVERY 6 HOURS PRN
COMMUNITY

## 2019-11-27 NOTE — PROGRESS NOTES
Thoracic Follow-Up  Assessment/Plan:    Shortness of breath  Mr Oumou Urias has been experiencing shortness of breath and dry cough for the past few days  He obtained a CXR today which demonstrates no evidence of recurrent pneumothorax on the left or right  There is no effusion or consolidation present bilaterally  Suspect that this could be the starting of a viral upper respiratory infection  He is afebrile today, and CXR stable  Recommend monitoring his symptoms for the next week, and call PCP if worsening or no change in symptoms  Consider pulmonology if no improvement over next few weeks  He is in agreement  Diagnoses and all orders for this visit:    History of pneumothorax    Shortness of breath    Other orders  -     ibuprofen (MOTRIN) 200 mg tablet; Take by mouth every 6 (six) hours as needed for mild pain          Thoracic History   Problem:  Recurrent left-sided spontaneous pneumothorax     Procedure:  05/28/2019 left VATS with apical and left lower lobe bleb resection mechanical pleurodesis     Pathology:  Benign lung parenchyma with blebs  Subjective:    Patient ID: Kadeem Roque is a 47 y o  male  HPI   Mr Oumou Urias is a 47year old gentlemen who underwent a left VATS apical and lower lobe bleb resection with mechanical pleurodesis May 28, 2019 for a recurrent left spontaneous pneumothorax  He was last seen 6/14/19 at which time he was doing well, and his CXR showed no pneumothorax  He is not smoking, quit 2015  On discussion, he reports some shortness of breath for the past few days, with activity, and after coughing  He has had dry cough for few days  No fevers, chills, or weight loss  Reports after coughing spell, his vision becomes blurry  Some lightheadedness when he becomes short of breath  Feels some discomfort at incisions on left when he coughs       The following portions of the patient's history were reviewed and updated as appropriate: allergies, current medications, past family history, past medical history, past social history, past surgical history and problem list   Past Surgical History:   Procedure Laterality Date    BACK SURGERY      [place] BRONCHOSCOPY,DIAGNOSTIC N/A 5/28/2019    Procedure: Vishal Todd;  Surgeon: Jennifer Zapata MD;  [facility];  Service: Thoracic    MI THORACOSCOPY SURG W/PLEURODESIS Left 5/28/2019    Procedure: THORACOSCOPY VIDEO ASSISTED SURGERY (VATS); MECHANICAL PLEURODESIS; BLEB RESECTION x 2;  Surgeon: Jennifer Zapata MD;  [facility];  Service: Thoracic    ROTATOR CUFF REPAIR           Review of Systems   Constitutional: Negative for activity change, appetite change, chills, diaphoresis, fatigue, fever and unexpected weight change  Eyes: Positive for visual disturbance  Respiratory: Positive for cough and shortness of breath  Negative for choking  Cardiovascular: Negative for chest pain and palpitations  Gastrointestinal: Negative for nausea and vomiting  Musculoskeletal: Negative for joint swelling and myalgias  Skin: Negative for color change and rash  Neurological: Positive for light-headedness  Hematological: Negative for adenopathy  Does not bruise/bleed easily  Objective:   Physical Exam   Constitutional: He is oriented to person, place, and time  He appears well-developed and well-nourished  No distress  HENT:   Head: Normocephalic and atraumatic  Eyes: Conjunctivae are normal  No scleral icterus  Neck: Neck supple  No tracheal deviation present  Cardiovascular: Normal rate, regular rhythm and normal heart sounds  Pulmonary/Chest: Effort normal and breath sounds normal  No respiratory distress  Abdominal: Soft  He exhibits no distension  Lymphadenopathy:     He has no cervical adenopathy  Neurological: He is alert and oriented to person, place, and time  Skin: Skin is warm and dry  Psychiatric: He has a normal mood and affect   His behavior is normal  Judgment and thought content normal    /81 (BP Location: Left arm, Patient Position: Sitting, Cuff Size: Large)   Pulse 72   Temp (!) 96 9 °F (36 1 °C)   Ht 5' 7" (1 702 m)   Wt 106 kg (234 lb)   SpO2 97%   BMI 36 65 kg/m²     Xr Chest Pa & Lateral    Result Date: 11/27/2019  Impression No acute cardiopulmonary disease  Workstation performed: [id number]       Ct Chest Wo Contrast    Result Date: 4/26/2019  Narrative CT CHEST WITHOUT IV CONTRAST INDICATION:   Pneumothorax, known, follow up  COMPARISON:  Plain films from 25th  TECHNIQUE: CT examination of the chest was performed without intravenous contrast   Axial, sagittal, and coronal 2D reformatted images were created from the source data and submitted for interpretation  Radiation dose length product (DLP) for this visit:  613 mGy-cm   This examination, like all CT scans performed in the [facility], was performed utilizing techniques to minimize radiation dose exposure, including the use of iterative reconstruction and automated exposure control  FINDINGS: LUNGS:  There is some minimal atelectasis in the lingula  There is a tiny bleb in the periphery of the left upper lobe on image 26 series 2  It is 8 mm diameter  There is a tiny bit of debris in the left mainstem bronchus  PLEURA:  There is a small left hydropneumothorax  The amount of fluid which is seen is quite minimal and is in the posterior costophrenic sulcus  Compared with prior plain films, the amount of gas in the pleural space has very significantly diminished with placement of the chest tube  The chest tube is a small caliber pigtail style chest tube and is located in the lateral aspect of the upper chest  HEART/GREAT VESSELS:  Unremarkable for patient's age  MEDIASTINUM AND LETA:  Unremarkable  CHEST WALL AND LOWER NECK:   There is a small amount of gas in the left chest wall near the site of the chest tube  No other significant findings   VISUALIZED STRUCTURES IN THE UPPER ABDOMEN:  Unremarkable  OSSEOUS STRUCTURES:  No acute fracture or destructive osseous lesion  Impression Significant decrease in the volume of pneumothorax after chest tube placement  Small amount of pleural fluid dependently in the left lower chest  There is minimal atelectasis in the lingula and minimal soft tissue gas in the left chest wall  There is an 8 mm bleb in the left upper lobe    There appears to be a tiny bit of debris in the left mainstem bronchus Workstation performed: [id number]

## 2019-11-27 NOTE — LETTER
November 27, 2019     [place] , [address]     Patient: Mynor Otero   YOB: 1965   Date of Visit: 11/27/2019       Dear Dr Feliciano Prior: Thank you for referring Gulshan Parkinson to me for evaluation  Below are my notes for this consultation  If you have questions, please do not hesitate to call me  I look forward to following your patient along with you  Sincerely,        Sonia Posada MD        CC: No Recipients  Ricki Amber  11/27/2019  3:16 PM  Attested  Thoracic Follow-Up  Assessment/Plan:    Shortness of breath  Mr Bronwyn Stevens has been experiencing shortness of breath and dry cough for the past few days  He obtained a CXR today which demonstrates no evidence of recurrent pneumothorax on the left or right  There is no effusion or consolidation present bilaterally  Suspect that this could be the starting of a viral upper respiratory infection  He is afebrile today, and CXR stable  Recommend monitoring his symptoms for the next week, and call PCP if worsening or no change in symptoms  Consider pulmonology if no improvement over next few weeks  He is in agreement  Diagnoses and all orders for this visit:    History of pneumothorax    Shortness of breath    Other orders  -     ibuprofen (MOTRIN) 200 mg tablet; Take by mouth every 6 (six) hours as needed for mild pain          Thoracic History   Problem:  Recurrent left-sided spontaneous pneumothorax     Procedure:  05/28/2019 left VATS with apical and left lower lobe bleb resection mechanical pleurodesis     Pathology:  Benign lung parenchyma with blebs  Subjective:    Patient ID: Mynor Otero is a 47 y o  male  HPI   Mr Bronwyn Stevens is a 47year old gentlemen who underwent a left VATS apical and lower lobe bleb resection with mechanical pleurodesis May 28, 2019 for a recurrent left spontaneous pneumothorax   He was last seen 6/14/19 at which time he was doing well, and his CXR showed no pneumothorax  He is not smoking, quit 2015  On discussion, he reports some shortness of breath for the past few days, with activity, and after coughing  He has had dry cough for few days  No fevers, chills, or weight loss  Reports after coughing spell, his vision becomes blurry  Some lightheadedness when he becomes short of breath  Feels some discomfort at incisions on left when he coughs  The following portions of the patient's history were reviewed and updated as appropriate: allergies, current medications, past family history, past medical history, past social history, past surgical history and problem list   Past Surgical History:   Procedure Laterality Date    BACK SURGERY      [place] [address] N/A 5/28/2019    Procedure: Negrito Rosales;  Surgeon: Alia Dent MD;  [facility];  Service: Thoracic    [place] THORACOSCOPY SURG W/PLEURODESIS Left 5/28/2019    Procedure: THORACOSCOPY VIDEO ASSISTED SURGERY (VATS); MECHANICAL PLEURODESIS; BLEB RESECTION x 2;  Surgeon: Alia Dent MD;  [facility];  Service: Thoracic    ROTATOR CUFF REPAIR           Review of Systems   Constitutional: Negative for activity change, appetite change, chills, diaphoresis, fatigue, fever and unexpected weight change  Eyes: Positive for visual disturbance  Respiratory: Positive for cough and shortness of breath  Negative for choking  Cardiovascular: Negative for chest pain and palpitations  Gastrointestinal: Negative for nausea and vomiting  Musculoskeletal: Negative for joint swelling and myalgias  Skin: Negative for color change and rash  Neurological: Positive for light-headedness  Hematological: Negative for adenopathy  Does not bruise/bleed easily  Objective:   Physical Exam   Constitutional: He is oriented to person, place, and time  He appears well-developed and well-nourished  No distress  HENT:   Head: Normocephalic and atraumatic  Eyes: Conjunctivae are normal  No scleral icterus  Neck: Neck supple  No tracheal deviation present  Cardiovascular: Normal rate, regular rhythm and normal heart sounds  Pulmonary/Chest: Effort normal and breath sounds normal  No respiratory distress  Abdominal: Soft  He exhibits no distension  Lymphadenopathy:     He has no cervical adenopathy  Neurological: He is alert and oriented to person, place, and time  Skin: Skin is warm and dry  Psychiatric: He has a normal mood and affect  His behavior is normal  Judgment and thought content normal    /81 (BP Location: Left arm, Patient Position: Sitting, Cuff Size: Large)   Pulse 72   Temp (!) 96 9 °F (36 1 °C)   Ht 5' 7" (1 702 m)   Wt 106 kg (234 lb)   SpO2 97%   BMI 36 65 kg/m²      Xr Chest Pa & Lateral    Result Date: 11/27/2019  Impression No acute cardiopulmonary disease  Workstation performed: [id number]       Ct Chest Wo Contrast    Result Date: 4/26/2019  Narrative CT CHEST WITHOUT IV CONTRAST INDICATION:   Pneumothorax, known, follow up  COMPARISON:  Plain films from 25th  TECHNIQUE: CT examination of the chest was performed without intravenous contrast   Axial, sagittal, and coronal 2D reformatted images were created from the source data and submitted for interpretation  Radiation dose length product (DLP) for this visit:  613 mGy-cm   This examination, like all CT scans performed in the [facility], was performed utilizing techniques to minimize radiation dose exposure, including the use of iterative reconstruction and automated exposure control  FINDINGS: LUNGS:  There is some minimal atelectasis in the lingula  There is a tiny bleb in the periphery of the left upper lobe on image 26 series 2  It is 8 mm diameter  There is a tiny bit of debris in the left mainstem bronchus  PLEURA:  There is a small left hydropneumothorax    The amount of fluid which is seen is quite minimal and is in the posterior costophrenic sulcus  Compared with prior plain films, the amount of gas in the pleural space has very significantly diminished with placement of the chest tube  The chest tube is a small caliber pigtail style chest tube and is located in the lateral aspect of the upper chest  HEART/GREAT VESSELS:  Unremarkable for patient's age  MEDIASTINUM AND LETA:  Unremarkable  CHEST WALL AND LOWER NECK:   There is a small amount of gas in the left chest wall near the site of the chest tube  No other significant findings  VISUALIZED STRUCTURES IN THE UPPER ABDOMEN:  Unremarkable  OSSEOUS STRUCTURES:  No acute fracture or destructive osseous lesion  Impression Significant decrease in the volume of pneumothorax after chest tube placement  Small amount of pleural fluid dependently in the left lower chest  There is minimal atelectasis in the lingula and minimal soft tissue gas in the left chest wall  There is an 8 mm bleb in the left upper lobe  There appears to be a tiny bit of debris in the left mainstem bronchus Workstation performed: [id number]       Attestation signed by Margaret Guevara MD at 11/27/2019  4:37 PM:  Thoracic surgery attending note    Patient seen and examined with PA this afternoon  Comes back to us status post 05/28/2019 left VATS apical bleb resection and pleurodesis for recurrent spontaneous pneumothorax  He was having a 2-3 day history of worsening shortness of breath which prompted him to call our office  He denies any fevers or chills  No productive cough  Just mild increased shortness of breath with exertion  No chest pain  No major weight changes  No other complaints  He continues to refrain from smoking  On exam he appears well  No acute distress  Lungs clear to auscultation bilaterally  Normal work of breathing on room air  No other significant findings  Incisions well healed    I personally reviewed his chest x-ray imaging in PACs    Chest x-ray from 11/26/2019 showed that his lungs are well expanded  No significant pneumothorax  No significant effusion  Normal postoperative appearance  No other findings    Assessment - 51-year-old male with history of recurrent spontaneous left-sided pneumothorax status post 05/28/2019 left VATS apical bleb resection mechanical pleurodesis with mild shortness of breath but no significant findings on exam or on imaging    Plan  - from a thoracic surgery standpoint I cannot identify any particular cause for patient's shortness of breath  His lungs appear well expanded on imaging  No consolidation  No concern for pneumonia  No evidence of recurrent pneumothorax   - from my standpoint I have advised the patient to give this few more days time  This may be secondary to a simple viral illness  If he continues to have shortness of breath I would like him to follow up with his primary care physician  I indicated he may need Pulmonary evaluation in the future    No scheduled thoracic surgical follow-up needed at this time    Ana Mcgregor MD

## 2020-01-07 ENCOUNTER — OFFICE VISIT (OUTPATIENT)
Dept: PULMONOLOGY | Facility: CLINIC | Age: 55
End: 2020-01-07
Payer: COMMERCIAL

## 2020-01-07 VITALS
HEIGHT: 68 IN | BODY MASS INDEX: 35.77 KG/M2 | WEIGHT: 236 LBS | DIASTOLIC BLOOD PRESSURE: 90 MMHG | TEMPERATURE: 97.9 F | HEART RATE: 89 BPM | SYSTOLIC BLOOD PRESSURE: 140 MMHG | OXYGEN SATURATION: 98 %

## 2020-01-07 DIAGNOSIS — R06.02 SHORTNESS OF BREATH: Primary | ICD-10-CM

## 2020-01-07 DIAGNOSIS — Z87.891 FORMER SMOKER: ICD-10-CM

## 2020-01-07 DIAGNOSIS — E66.09 CLASS 2 OBESITY DUE TO EXCESS CALORIES WITH BODY MASS INDEX (BMI) OF 35.0 TO 35.9 IN ADULT, UNSPECIFIED WHETHER SERIOUS COMORBIDITY PRESENT: ICD-10-CM

## 2020-01-07 DIAGNOSIS — Z87.09 HISTORY OF PNEUMOTHORAX: ICD-10-CM

## 2020-01-07 DIAGNOSIS — G47.33 OSA ON CPAP: Chronic | ICD-10-CM

## 2020-01-07 DIAGNOSIS — Z99.89 OSA ON CPAP: Chronic | ICD-10-CM

## 2020-01-07 PROBLEM — E66.812 CLASS 2 OBESITY IN ADULT: Status: ACTIVE | Noted: 2020-01-07

## 2020-01-07 PROBLEM — E66.9 CLASS 2 OBESITY IN ADULT: Status: ACTIVE | Noted: 2020-01-07

## 2020-01-07 PROCEDURE — 99245 OFF/OP CONSLTJ NEW/EST HI 55: CPT | Performed by: INTERNAL MEDICINE

## 2020-01-07 NOTE — PROGRESS NOTES
Consultation - Pulmonary Medicine   [place] Franchesca Chrissy 47 y o  male MRN: [id number]        Physician Requesting Consult:  Self-referred  Reason for Consult:  Dyspnea on exertion    Shortness of breath  Could be multifactorial including obesity and possible COPD with his history of smoking, also patient may have diastolic CHF  Other things to consider is pulmonary hypertension if workup is negative  We attempted to do spirometry in office but unfortunately after performing the spirometry the data were lost before reviewing it by me  So I decided to send patient for PFTs and I gave him a sample of Spiriva Respimat 2 5 mcg and a showed him how to use and use in office  Will follow in 3 months  I will call him with the spirometry/PFTs, if normal then he will need echocardiogram otherwise I can start him on inhalers  Former smoker  Patient smoked more than 30 pack year history, fortunately quit 6 years ago probably, next year he should be on the lung cancer screening program with low-dose CT scan  His most recent CT scan from April of last year was negative  KATHLEEN on CPAP  Continue CPAP for now, will obtain compliance report next visit and discuss, I may send him for repeat study if he continues to have severe symptoms, or probably send him to or Sleep Clinic  History of pneumothorax  Recurrent left-sided spontaneous probably secondary pneumothorax with history of bleb, status post VATS unsuccessful expansion of lungs  He follows with thoracic surgery  No issues at this time  Class 2 obesity in adult  Encouraged patient to decrease calorie intake and exercise to lose weight which may help his obstructive sleep apnea and his dyspnea on exertion  Diagnoses and all orders for this visit:    Shortness of breath  -     POCT spirometry  with bronchodilator  -     Complete PFT with post bronchodilator;  Future    KATHLEEN on CPAP    History of pneumothorax    Former smoker  -     POCT spirometry  with bronchodilator  -     Complete PFT with post bronchodilator; Future    Class 2 obesity due to excess calories with body mass index (BMI) of 35 0 to 35 9 in adult, unspecified whether serious comorbidity present      ______________________________________________________________________    HPI:    Deric Saucedo is a 47 y o  male who presents for evaluation of chronic dyspnea on exertion that has been going for few years  Patient is a former smoker, he quit smoking few years ago and prior to that he smoked about 35 pack year  He complains of dyspnea on exertion that is getting worse specially with climbing steps or bending down, he denies any wheezing, denies any chest pain, denies any chronic cough or sputum production  He denies any fever or chills or night sweats  Patient had spontaneous left-sided pneumothorax 2-3 years ago that was treated with chest tube then recurred last year and underwent VATS with bleb resection and recovered well and continue to have shortness of breath  Patient denies legs edema or orthopnea  He denies cardiac disease  Patient recalls having mild asthma growing up and he used to be on p r n  Ventolin  He has no asthma symptoms for long time  No history of intubation due to asthma  Patient denies GERD symptoms or postnasal drip  Patient was diagnosed with sleep apnea and he currently has CPAP machine with nasal mask and auto CPAP starting at 7 cm H2O  He uses CPAP every night but he does not feel better, he continues to have snoring and trouble sleeping and also excessive daytime sleepiness and fatigue  He lives at home with his wife, they have pets (2 does and lung) at home but no birds, denies occupational history  Review of Systems:  Review of Systems   Constitutional: Negative  HENT: Negative  Eyes: Negative  Respiratory:        As HPI   Cardiovascular: Negative  Gastrointestinal: Negative  Endocrine: Negative  Genitourinary: Negative  Musculoskeletal: Negative  Skin: Negative  Allergic/Immunologic: Negative  Neurological: Negative  Hematological: Negative  Psychiatric/Behavioral: Negative  Current Medications:    Current Outpatient Medications:     ibuprofen (MOTRIN) 200 mg tablet, Take by mouth every 6 (six) hours as needed for mild pain, Disp: , Rfl:     traMADol (ULTRAM) 50 mg tablet, Take 50 mg by mouth every 6 (six) hours as needed for moderate pain, Disp: , Rfl:     acetaminophen (TYLENOL) 325 mg tablet, Take 2 tablets (650 mg total) by mouth every 6 (six) hours as needed for mild pain (Patient not taking: Reported on 11/27/2019), Disp: 30 tablet, Rfl: 0    docusate sodium (COLACE) 100 mg capsule, Take 1 capsule (100 mg total) by mouth 2 (two) times a day While taking narcotic pain medication (Patient not taking: Reported on 6/14/2019), Disp: 10 capsule, Rfl: 0    gabapentin (NEURONTIN) 300 mg capsule, Take 1 capsule (300 mg total) by mouth 3 (three) times a day (Patient not taking: Reported on 11/27/2019), Disp: 63 capsule, Rfl: 0    Historical Information   Past Medical History:   Diagnosis Date    Collapsed lung     CPAP (continuous positive airway pressure) dependence     Hypertension     Lumbago     KATHLEEN (obstructive sleep apnea)     Recurrent spontaneous pneumothorax     Right-sided chest pain     Spontaneous pneumothorax     Umbilical hernia      Past Surgical History:   Procedure Laterality Date    BACK SURGERY      [place] BRONCHOSCOPY,DIAGNOSTIC N/A 5/28/2019    Procedure: Sarah Eis;  Surgeon: Wesley Galvin MD;  [facility];  Service: Thoracic    DE THORACOSCOPY SURG W/PLEURODESIS Left 5/28/2019    Procedure: THORACOSCOPY VIDEO ASSISTED SURGERY (VATS);  MECHANICAL PLEURODESIS; BLEB RESECTION x 2;  Surgeon: Wesley Galvin MD;  [facility];  Service: Thoracic    ROTATOR CUFF REPAIR       Social History   Social History     Tobacco Use   Smoking Status Former Smoker    Packs/day: 1 00    Years: 20 00    Pack years: 20 00    Types: Cigarettes    Last attempt to quit: 2016    Years since quittin 0   Smokeless Tobacco Never Used       Occupational history:  No occupational exposure    Family History:   Family History   Problem Relation Age of Onset    Asthma Mother     Deep vein thrombosis Father          PhysicalExamination:  Vitals:   /90 (BP Location: Left arm, Patient Position: Sitting)   Pulse 89   Temp 97 9 °F (36 6 °C) (Tympanic)   Ht 5' 8" (1 727 m)   Wt 107 kg (236 lb)   SpO2 98%   BMI 35 88 kg/m²     General: alert, not in acute distress  HEENT: PERRL, no icteric sclera or cyanosis, no thrush  Neck:  Supple, no lymphadenopathy or thyromegaly, no JVD  Lungs:  Equal breath sounds and clear auscultations bilaterally, no wheezing or crackles  Heart: S1S2 regular, no murmures or gallops  Abdomen: soft, non-tender, bowel sounds  present  Extrimities: no edema, no clubbing or cyanosis  Neuro: Alert and oriented x 3, no focal neurodeficits   Skin: intact, no rashes      Diagnostic Data:  Labs:   I personally reviewed the most recent laboratory data pertinent to today's visit    Lab Results   Component Value Date    WBC 15 59 (H) 2019    HGB 13 6 2019    HCT 39 6 2019    MCV 89 2019     2019     Lab Results   Component Value Date    GLUCOSE 120 2015    CALCIUM 8 3 2019     2015    K 4 1 2019    CO2 26 2019     2019    BUN 13 2019    CREATININE 1 02 2019     No results found for: IGE  Lab Results   Component Value Date    ALT 44 2019    AST 14 2019    ALKPHOS 59 2019           Imaging:  I personally reviewed the images on the [facility] system pertinent to today's visit  Chest x-ray from 2019 reviewed on PACs:  Clear lungs    Chest CT scan from 2019 reviewed on PACs:  Clear lungs parenchyma, small left-sided pneumothorax    Other studies:  From [facility] records:  Sleep Study: Very severe sleep apnea with associated very severe episodic hypoxia on CPAP 8-20      Ino Gamez MD

## 2020-01-07 NOTE — LETTER
January 9, 2020     [place] [id number] [place]     Patient: Deric Saucedo   YOB: 1965   Date of Visit: 1/7/2020       Dear Dr Roland Hayes: Thank you for referring Jen Marroquin to me for evaluation  Below are my notes for this consultation  If you have questions, please do not hesitate to call me  I look forward to following your patient along with you  Sincerely,        Suzi Andrew MD        CC: No Recipients  Suzi Andrew MD  1/7/2020  4:13 PM  Sign at close encounter    Consultation - Pulmonary Medicine   Karli Lowe 47 y o  male MRN: [id number]        Physician Requesting Consult:  Self-referred  Reason for Consult:  Dyspnea on exertion    Shortness of breath  Could be multifactorial including obesity and possible COPD with his history of smoking, also patient may have diastolic CHF  Other things to consider is pulmonary hypertension if workup is negative  We attempted to do spirometry in office but unfortunately after performing the spirometry the data were lost before reviewing it by me  So I decided to send patient for PFTs and I gave him a sample of Spiriva Respimat 2 5 mcg and a showed him how to use and use in office  Will follow in 3 months  I will call him with the spirometry/PFTs, if normal then he will need echocardiogram otherwise I can start him on inhalers  Former smoker  Patient smoked more than 30 pack year history, fortunately quit 6 years ago probably, next year he should be on the lung cancer screening program with low-dose CT scan  His most recent CT scan from April of last year was negative  KATHLEEN on CPAP  Continue CPAP for now, will obtain compliance report next visit and discuss, I may send him for repeat study if he continues to have severe symptoms, or probably send him to or Sleep Clinic      History of pneumothorax  Recurrent left-sided spontaneous probably secondary pneumothorax with history of bleb, status post VATS unsuccessful expansion of lungs  He follows with thoracic surgery  No issues at this time  Class 2 obesity in adult  Encouraged patient to decrease calorie intake and exercise to lose weight which may help his obstructive sleep apnea and his dyspnea on exertion  Diagnoses and all orders for this visit:    Shortness of breath  -     POCT spirometry  with bronchodilator  -     Complete PFT with post bronchodilator; Future    KATHLEEN on CPAP    History of pneumothorax    Former smoker  -     POCT spirometry  with bronchodilator  -     Complete PFT with post bronchodilator; Future    Class 2 obesity due to excess calories with body mass index (BMI) of 35 0 to 35 9 in adult, unspecified whether serious comorbidity present      ______________________________________________________________________    HPI:    Luigi Dias is a 47 y o  male who presents for evaluation of chronic dyspnea on exertion that has been going for few years  Patient is a former smoker, he quit smoking few years ago and prior to that he smoked about 35 pack year  He complains of dyspnea on exertion that is getting worse specially with climbing steps or bending down, he denies any wheezing, denies any chest pain, denies any chronic cough or sputum production  He denies any fever or chills or night sweats  Patient had spontaneous left-sided pneumothorax 2-3 years ago that was treated with chest tube then recurred last year and underwent VATS with bleb resection and recovered well and continue to have shortness of breath  Patient denies legs edema or orthopnea  He denies cardiac disease  Patient recalls having mild asthma growing up and he used to be on p r n  Ventolin  He has no asthma symptoms for long time  No history of intubation due to asthma  Patient denies GERD symptoms or postnasal drip  Patient was diagnosed with sleep apnea and he currently has CPAP machine with nasal mask and auto CPAP starting at 7 cm H2O    He uses CPAP every night but he does not feel better, he continues to have snoring and trouble sleeping and also excessive daytime sleepiness and fatigue  He lives at home with his wife, they have pets (2 does and lung) at home but no birds, denies occupational history  Review of Systems:  Review of Systems   Constitutional: Negative  HENT: Negative  Eyes: Negative  Respiratory:        As HPI   Cardiovascular: Negative  Gastrointestinal: Negative  Endocrine: Negative  Genitourinary: Negative  Musculoskeletal: Negative  Skin: Negative  Allergic/Immunologic: Negative  Neurological: Negative  Hematological: Negative  Psychiatric/Behavioral: Negative          Current Medications:    Current Outpatient Medications:     ibuprofen (MOTRIN) 200 mg tablet, Take by mouth every 6 (six) hours as needed for mild pain, Disp: , Rfl:     traMADol (ULTRAM) 50 mg tablet, Take 50 mg by mouth every 6 (six) hours as needed for moderate pain, Disp: , Rfl:     acetaminophen (TYLENOL) 325 mg tablet, Take 2 tablets (650 mg total) by mouth every 6 (six) hours as needed for mild pain (Patient not taking: Reported on 11/27/2019), Disp: 30 tablet, Rfl: 0    docusate sodium (COLACE) 100 mg capsule, Take 1 capsule (100 mg total) by mouth 2 (two) times a day While taking narcotic pain medication (Patient not taking: Reported on 6/14/2019), Disp: 10 capsule, Rfl: 0    gabapentin (NEURONTIN) 300 mg capsule, Take 1 capsule (300 mg total) by mouth 3 (three) times a day (Patient not taking: Reported on 11/27/2019), Disp: 63 capsule, Rfl: 0    Historical Information   Past Medical History:   Diagnosis Date    Collapsed lung     CPAP (continuous positive airway pressure) dependence     Hypertension     Lumbago     KATHLEEN (obstructive sleep apnea)     Recurrent spontaneous pneumothorax     Right-sided chest pain     Spontaneous pneumothorax     Umbilical hernia      Past Surgical History:   Procedure Laterality Date    BACK SURGERY      [place] BRONCHOSCOPY,DIAGNOSTIC N/A 2019    Procedure: Raji Short;  Surgeon: Bolivar Lombard, MD;  [facility];  Service: Thoracic    [place] THORACOSCOPY SURG W/PLEURODESIS Left 2019    Procedure: THORACOSCOPY VIDEO ASSISTED SURGERY (VATS); MECHANICAL PLEURODESIS; BLEB RESECTION x 2;  Surgeon: Bolivar Lombard, MD;  [facility];  Service: Thoracic    ROTATOR CUFF REPAIR       Social History   Social History     Tobacco Use   Smoking Status Former Smoker    Packs/day:     Years: 20     Pack years: 20     Types: Cigarettes    Last attempt to quit: 2016    Years since quittin 0   Smokeless Tobacco Never Used       Occupational history:  No occupational exposure    Family History:   Family History   Problem Relation Age of Onset    Asthma Mother     Deep vein thrombosis Father          PhysicalExamination:  Vitals:   /90 (BP Location: Left arm, Patient Position: Sitting)   Pulse 89   Temp 97 9 °F (36 6 °C) (Tympanic)   Ht 5' 8" (1 727 m)   Wt 107 kg (236 lb)   SpO2 98%   BMI 35 88 kg/m²      General: alert, not in acute distress  HEENT: PERRL, no icteric sclera or cyanosis, no thrush  Neck:  Supple, no lymphadenopathy or thyromegaly, no JVD  Lungs:  Equal breath sounds and clear auscultations bilaterally, no wheezing or crackles  Heart: S1S2 regular, no murmures or gallops  Abdomen: soft, non-tender, bowel sounds  present  Extrimities: no edema, no clubbing or cyanosis  Neuro: Alert and oriented x 3, no focal neurodeficits   Skin: intact, no rashes      Diagnostic Data:  Labs:   I personally reviewed the most recent laboratory data pertinent to today's visit    Lab Results   Component Value Date    WBC 15 59 (H) 2019    HGB 13 6 2019    HCT 39 6 2019    MCV 89 2019     2019     Lab Results   Component Value Date    GLUCOSE 120 2015    CALCIUM 8 3 2019     03/05/2015    K 4 1 05/29/2019    CO2 26 05/29/2019     05/29/2019    BUN 13 05/29/2019    CREATININE 1 02 05/29/2019     No results found for: IGE  Lab Results   Component Value Date    ALT 44 04/25/2019    AST 14 04/25/2019    ALKPHOS 59 04/25/2019           Imaging:  I personally reviewed the images on the [facility] system pertinent to today's visit  Chest x-ray from November 2019 reviewed on PACs:  Clear lungs    Chest CT scan from April 2019 reviewed on PACs:  Clear lungs parenchyma, small left-sided pneumothorax    Other studies:  From [facility] records:  Sleep Study: Very severe sleep apnea with associated very severe episodic hypoxia on CPAP 8-20      Vahid Weir MD

## 2020-01-07 NOTE — ASSESSMENT & PLAN NOTE
Patient smoked more than 30 pack year history, fortunately quit 6 years ago probably, next year he should be on the lung cancer screening program with low-dose CT scan  His most recent CT scan from April of last year was negative

## 2020-01-07 NOTE — ASSESSMENT & PLAN NOTE
Continue CPAP for now, will obtain compliance report next visit and discuss, I may send him for repeat study if he continues to have severe symptoms, or probably send him to or Sleep Clinic

## 2020-01-07 NOTE — ASSESSMENT & PLAN NOTE
Encouraged patient to decrease calorie intake and exercise to lose weight which may help his obstructive sleep apnea and his dyspnea on exertion

## 2020-01-07 NOTE — ASSESSMENT & PLAN NOTE
Recurrent left-sided spontaneous probably secondary pneumothorax with history of bleb, status post VATS unsuccessful expansion of lungs  He follows with thoracic surgery  No issues at this time

## 2020-01-07 NOTE — ASSESSMENT & PLAN NOTE
Could be multifactorial including obesity and possible COPD with his history of smoking, also patient may have diastolic CHF  Other things to consider is pulmonary hypertension if workup is negative  We attempted to do spirometry in office but unfortunately after performing the spirometry the data were lost before reviewing it by me  So I decided to send patient for PFTs and I gave him a sample of Spiriva Respimat 2 5 mcg and a showed him how to use and use in office  Will follow in 3 months  I will call him with the spirometry/PFTs, if normal then he will need echocardiogram otherwise I can start him on inhalers

## 2020-01-15 ENCOUNTER — HOSPITAL ENCOUNTER (OUTPATIENT)
Dept: PULMONOLOGY | Facility: HOSPITAL | Age: 55
Discharge: HOME | End: 2020-01-15
Attending: INTERNAL MEDICINE
Payer: COMMERCIAL

## 2020-01-15 DIAGNOSIS — R06.02 SHORTNESS OF BREATH: ICD-10-CM

## 2020-01-15 DIAGNOSIS — Z87.891 FORMER SMOKER: ICD-10-CM

## 2020-01-15 PROCEDURE — 94729 DIFFUSING CAPACITY: CPT

## 2020-01-15 PROCEDURE — 94060 EVALUATION OF WHEEZING: CPT

## 2020-01-15 PROCEDURE — 94060 EVALUATION OF WHEEZING: CPT | Performed by: INTERNAL MEDICINE

## 2020-01-15 PROCEDURE — 94760 N-INVAS EAR/PLS OXIMETRY 1: CPT

## 2020-01-15 PROCEDURE — 94726 PLETHYSMOGRAPHY LUNG VOLUMES: CPT | Performed by: INTERNAL MEDICINE

## 2020-01-15 PROCEDURE — 94726 PLETHYSMOGRAPHY LUNG VOLUMES: CPT

## 2020-01-15 PROCEDURE — 94729 DIFFUSING CAPACITY: CPT | Performed by: INTERNAL MEDICINE

## 2020-01-15 RX ORDER — ALBUTEROL SULFATE 2.5 MG/3ML
2.5 SOLUTION RESPIRATORY (INHALATION) ONCE
Status: DISCONTINUED | OUTPATIENT
Start: 2020-01-15 | End: 2020-01-19 | Stop reason: HOSPADM

## 2020-01-20 ENCOUNTER — TELEPHONE (OUTPATIENT)
Dept: PULMONOLOGY | Facility: CLINIC | Age: 55
End: 2020-01-20

## 2020-03-10 ENCOUNTER — TELEPHONE (OUTPATIENT)
Dept: PULMONOLOGY | Facility: CLINIC | Age: 55
End: 2020-03-10

## 2020-03-10 DIAGNOSIS — J44.9 CHRONIC OBSTRUCTIVE PULMONARY DISEASE, UNSPECIFIED COPD TYPE (HCC): Primary | ICD-10-CM

## 2020-03-10 NOTE — TELEPHONE ENCOUNTER
Patient left  saying he has been wheezing the past couple days  He was given a sample of Spiriva and he said he wasn't sure if he needs a refill   [phone]

## 2020-03-10 NOTE — TELEPHONE ENCOUNTER
Spoke with Marleni Dobbins  He says the past couple days he has been wheezing a lot  He is coughing and sometimes has mucous that is clear/light yellow  He is SOB with exertion and has some chest tightness  He says he has some body aches  Denies fever and chills  Denies being out of the country or around anyone who has  Please advise

## 2020-03-10 NOTE — PROGRESS NOTES
Pulmonary Follow Up Note   Johny Lowe 47 y o  male MRN: [id number]  3/11/2020      Assessment:    1  Mild persistent asthma with acute exacerbation  - given patient's normal pulmonary function testing, he likely has some component of asthma, as he states that he also was diagnosed with asthma as a child  - although he does have significant smoking history, he does not have fixed obstruction on PFTs  - he is currently wheezing on exam, likely triggered by viral URI  - will treat with prednisone burst 40 mg for 5 days  - start Breo 100/25 1 puff daily with p r n  Albuterol  - patient will return in 1 month for re-evaluation    2  Shortness of breath  - likely secondary to above, along with deconditioning/obesity  - pulmonary function testing without any significant abnormalities and normal diffusion  - can consider echocardiogram in the future if needed    3  Nicotine dependence in remission  - former smoker, more than 30 pack years, quit 6 years ago  - will need repeat lung cancer screening CT after age 51    2  Obstructive sleep apnea on CPAP  - continue CPAP nightly, patient uses auto CPAP 8-20, he is currently awaiting new machine, as his is not work  - even when machine was working, he complains of excessive somnolence, apnea, snoring  - given this, will send for split study for repeat titration of CPAP  - his residual AHI on recent compliance was 8 1, but with minimal usage    5  History of pneumothorax  - recurrent left-sided spontaneous probably secondary pneumothorax with history of blebs  - status post VATS with successful expansion of lung, follows with thoracic surgery    Plan:    Diagnoses and all orders for this visit:    Mild persistent asthma with acute exacerbation  -     fluticasone-vilanterol (BREO ELLIPTA) 100-25 mcg/inh inhaler; Inhale 1 puff daily Rinse mouth after use  -     predniSONE 20 mg tablet;  Take 2 tablets (40 mg total) by mouth daily for 5 days  -     albuterol (Ventolin HFA) 90 complains of congestion  He has been wheezing, which has been more bother some at night  He had tried Spiriva and states that he felt some relief, but ran out of the sample  He does not have any rescue inhaler  Patient also states that his CPAP machine has not been working recently  Even when it was working, he states that he was still having snoring and apnea, with dry mouth and excessive daytime somnolence  Compliance report reviewed from [employer] and it appears patient has only used his machine sparingly  It is auto CPAP 8-20 and residual AHI 8 1  I did not have any prior sleep study to review  He thinks he has gained weight since his prior study  His mean pressure is 9 8 and peak average pressure is 11 2  Review of Systems   Constitutional: Negative for chills, fever and unexpected weight change  HENT: Positive for congestion  Negative for rhinorrhea, sneezing and sore throat  Respiratory: Positive for cough, chest tightness, shortness of breath and wheezing  Cardiovascular: Negative for chest pain, palpitations and leg swelling  Gastrointestinal: Negative for abdominal pain, constipation, diarrhea, nausea and vomiting  Endocrine: Negative for cold intolerance and heat intolerance  Genitourinary: Negative for dysuria  Musculoskeletal: Negative for arthralgias  Allergic/Immunologic: Negative for immunocompromised state  Neurological: Negative for dizziness and numbness       Historical Information   Past Medical History:   Diagnosis Date    Collapsed lung     CPAP (continuous positive airway pressure) dependence     Hypertension     Lumbago     KATHLEEN (obstructive sleep apnea)     Recurrent spontaneous pneumothorax     Right-sided chest pain     Spontaneous pneumothorax     Umbilical hernia      Past Surgical History:   Procedure Laterality Date    BACK SURGERY      [place] BRONCHOSCOPY,DIAGNOSTIC N/A 5/28/2019    Procedure: BRONCHOSCOPY FLEXIBLE;  Surgeon: Soren Ricardo MD;  [facility];  Service: Thoracic    OR THORACOSCOPY SURG W/PLEURODESIS Left 5/28/2019    Procedure: THORACOSCOPY VIDEO ASSISTED SURGERY (VATS);  MECHANICAL PLEURODESIS; BLEB RESECTION x 2;  Surgeon: Evelia Bone MD;  [facility];  Service: Thoracic    ROTATOR CUFF REPAIR       Family History   Problem Relation Age of Onset    Asthma Mother     Deep vein thrombosis Father      Meds/Allergies     Current Outpatient Medications:     ibuprofen (MOTRIN) 200 mg tablet, Take by mouth every 6 (six) hours as needed for mild pain, Disp: , Rfl:     tiotropium (SPIRIVA RESPIMAT) 2 5 MCG/ACT AERS inhaler, Inhale 2 puffs daily, Disp: 1 Inhaler, Rfl: 5    traMADol (ULTRAM) 50 mg tablet, Take 50 mg by mouth every 6 (six) hours as needed for moderate pain, Disp: , Rfl:     acetaminophen (TYLENOL) 325 mg tablet, Take 2 tablets (650 mg total) by mouth every 6 (six) hours as needed for mild pain (Patient not taking: Reported on 11/27/2019), Disp: 30 tablet, Rfl: 0    albuterol (Ventolin HFA) 90 mcg/act inhaler, Inhale 2 puffs every 6 (six) hours as needed for wheezing, Disp: 18 g, Rfl: 3    docusate sodium (COLACE) 100 mg capsule, Take 1 capsule (100 mg total) by mouth 2 (two) times a day While taking narcotic pain medication (Patient not taking: Reported on 6/14/2019), Disp: 10 capsule, Rfl: 0    fluticasone-vilanterol (BREO ELLIPTA) 100-25 mcg/inh inhaler, Inhale 1 puff daily Rinse mouth after use , Disp: 1 Inhaler, Rfl: 3    gabapentin (NEURONTIN) 300 mg capsule, Take 1 capsule (300 mg total) by mouth 3 (three) times a day (Patient not taking: Reported on 11/27/2019), Disp: 63 capsule, Rfl: 0    predniSONE 20 mg tablet, Take 2 tablets (40 mg total) by mouth daily for 5 days, Disp: 10 tablet, Rfl: 0  Allergies   Allergen Reactions    Penicillins Hives, Itching and GI Intolerance    Codeine Dizziness     Reports dizziness when received codeine at the age of 13       Vitals: Blood pressure 142/98, pulse 94, temperature (!) 97 3 °F (36 3 °C), temperature source Tympanic, height 5' 7" (1 702 m), weight 108 kg (238 lb 9 6 oz), SpO2 98 %  Body mass index is 37 37 kg/m²  Oxygen Therapy  SpO2: 98 %  Oxygen Therapy: None (Room air)    Physical Exam   Constitutional: He is oriented to person, place, and time  He appears well-developed and well-nourished  No distress  Obese   HENT:   Head: Normocephalic and atraumatic  Mouth/Throat: Oropharynx is clear and moist  No oropharyngeal exudate  Eyes: EOM are normal  No scleral icterus  Cardiovascular: Normal rate, regular rhythm and normal heart sounds  No murmur heard  Pulmonary/Chest: Effort normal and breath sounds normal  No respiratory distress  He has no wheezes  Mild expiratory wheeze diffusely   Abdominal: Soft  Bowel sounds are normal  He exhibits no distension  There is no tenderness  Musculoskeletal: He exhibits no edema  Neurological: He is alert and oriented to person, place, and time  Skin: He is not diaphoretic  Labs: I have personally reviewed pertinent lab results  Lab Results   Component Value Date    WBC 15 59 (H) 2019    HGB 13 6 2019    HCT 39 6 2019    MCV 89 2019     2019     Lab Results   Component Value Date    GLUCOSE 120 2015    CALCIUM 8 3 2019     2015    K 4 1 2019    CO2 26 2019     2019    BUN 13 2019    CREATININE 1 02 2019     No results found for: IGE  Lab Results   Component Value Date    ALT 44 2019    AST 14 2019    ALKPHOS 59 2019     Imaging and other studies: I have personally reviewed pertinent reports     and I have personally reviewed pertinent films in PACS  Chest x-ray 2019  Clear    Pulmonary function testin/15/20  Results:  FEV1/FVC Ratio:  75 %  Forced Vital Capacity:  4 40 L   97 % predicted  FEV1:  3 28 L    92 % predicted  After administration of bronchodilator FVC:  4 59 L, 101 % predicted, +4 % change  FEV1:  3 46 L, 97 % predicted, +5 % change     Lung volumes by body plethysmography:   Total Lung Capacity 101 % predicted   Residual volume 105 % predicted     DLCO corrected for patients hemoglobin level:  99 %     Interpretation:     · Normal Spirometry     · No significant response to the administration to bronchodilator per ATS Standards     · Normal Lung volumes     · Normal diffusion     · Normal flow volume loops    EKG, Pathology, and Other Studies: I have personally reviewed pertinent reports      No echo to review      Pankaj Short MD  Pulmonary & Critical Care Fellow, [address] [place] [address]

## 2020-03-11 ENCOUNTER — OFFICE VISIT (OUTPATIENT)
Dept: PULMONOLOGY | Facility: CLINIC | Age: 55
End: 2020-03-11
Payer: COMMERCIAL

## 2020-03-11 VITALS
HEART RATE: 94 BPM | OXYGEN SATURATION: 98 % | TEMPERATURE: 97.3 F | BODY MASS INDEX: 37.45 KG/M2 | DIASTOLIC BLOOD PRESSURE: 98 MMHG | WEIGHT: 238.6 LBS | SYSTOLIC BLOOD PRESSURE: 142 MMHG | HEIGHT: 67 IN

## 2020-03-11 DIAGNOSIS — G47.33 OSA ON CPAP: Chronic | ICD-10-CM

## 2020-03-11 DIAGNOSIS — Z99.89 OSA ON CPAP: Chronic | ICD-10-CM

## 2020-03-11 DIAGNOSIS — R06.02 SHORTNESS OF BREATH: ICD-10-CM

## 2020-03-11 DIAGNOSIS — Z87.891 FORMER SMOKER: ICD-10-CM

## 2020-03-11 DIAGNOSIS — J45.31 MILD PERSISTENT ASTHMA WITH ACUTE EXACERBATION: Primary | ICD-10-CM

## 2020-03-11 DIAGNOSIS — Z87.09 HISTORY OF PNEUMOTHORAX: ICD-10-CM

## 2020-03-11 PROBLEM — J45.30 MILD PERSISTENT ASTHMA: Status: ACTIVE | Noted: 2020-03-11

## 2020-03-11 PROCEDURE — 99215 OFFICE O/P EST HI 40 MIN: CPT | Performed by: INTERNAL MEDICINE

## 2020-03-11 RX ORDER — FLUTICASONE FUROATE AND VILANTEROL 100; 25 UG/1; UG/1
1 POWDER RESPIRATORY (INHALATION) DAILY
Qty: 1 INHALER | Refills: 3 | Status: SHIPPED | OUTPATIENT
Start: 2020-03-11 | End: 2020-03-16 | Stop reason: HOSPADM

## 2020-03-11 RX ORDER — ALBUTEROL SULFATE 90 UG/1
2 AEROSOL, METERED RESPIRATORY (INHALATION) EVERY 6 HOURS PRN
Qty: 18 G | Refills: 3 | Status: SHIPPED | OUTPATIENT
Start: 2020-03-11 | End: 2020-08-25 | Stop reason: SDUPTHER

## 2020-03-11 RX ORDER — PREDNISONE 20 MG/1
40 TABLET ORAL DAILY
Qty: 10 TABLET | Refills: 0 | Status: ON HOLD | OUTPATIENT
Start: 2020-03-11 | End: 2020-03-16 | Stop reason: SDUPTHER

## 2020-03-15 ENCOUNTER — HOSPITAL ENCOUNTER (OUTPATIENT)
Facility: HOSPITAL | Age: 55
Setting detail: OBSERVATION
Discharge: HOME | End: 2020-03-16
Attending: EMERGENCY MEDICINE | Admitting: HOSPITALIST
Payer: COMMERCIAL

## 2020-03-15 ENCOUNTER — APPOINTMENT (EMERGENCY)
Dept: RADIOLOGY | Facility: HOSPITAL | Age: 55
End: 2020-03-15
Payer: COMMERCIAL

## 2020-03-15 DIAGNOSIS — J45.31 MILD PERSISTENT ASTHMA WITH ACUTE EXACERBATION: ICD-10-CM

## 2020-03-15 DIAGNOSIS — R06.02 SHORTNESS OF BREATH: Primary | ICD-10-CM

## 2020-03-15 LAB
BASOPHILS # BLD AUTO: 0.02 THOUSANDS/ΜL (ref 0–0.1)
BASOPHILS NFR BLD AUTO: 0 % (ref 0–1)
EOSINOPHIL # BLD AUTO: 0.07 THOUSAND/ΜL (ref 0–0.61)
EOSINOPHIL NFR BLD AUTO: 1 % (ref 0–6)
ERYTHROCYTE [DISTWIDTH] IN BLOOD BY AUTOMATED COUNT: 12.9 % (ref 11.6–15.1)
HCT VFR BLD AUTO: 42.9 % (ref 36.5–49.3)
HGB BLD-MCNC: 14.8 G/DL (ref 12–17)
IMM GRANULOCYTES # BLD AUTO: 0.1 THOUSAND/UL (ref 0–0.2)
IMM GRANULOCYTES NFR BLD AUTO: 1 % (ref 0–2)
LYMPHOCYTES # BLD AUTO: 1.44 THOUSANDS/ΜL (ref 0.6–4.47)
LYMPHOCYTES NFR BLD AUTO: 13 % (ref 14–44)
MCH RBC QN AUTO: 30.9 PG (ref 26.8–34.3)
MCHC RBC AUTO-ENTMCNC: 34.5 G/DL (ref 31.4–37.4)
MCV RBC AUTO: 90 FL (ref 82–98)
MONOCYTES # BLD AUTO: 0.28 THOUSAND/ΜL (ref 0.17–1.22)
MONOCYTES NFR BLD AUTO: 3 % (ref 4–12)
NEUTROPHILS # BLD AUTO: 9.29 THOUSANDS/ΜL (ref 1.85–7.62)
NEUTS SEG NFR BLD AUTO: 82 % (ref 43–75)
NRBC BLD AUTO-RTO: 0 /100 WBCS
PLATELET # BLD AUTO: 225 THOUSANDS/UL (ref 149–390)
PMV BLD AUTO: 9.1 FL (ref 8.9–12.7)
RBC # BLD AUTO: 4.79 MILLION/UL (ref 3.88–5.62)
TROPONIN I SERPL-MCNC: <0.02 NG/ML
WBC # BLD AUTO: 11.2 THOUSAND/UL (ref 4.31–10.16)

## 2020-03-15 PROCEDURE — 94640 AIRWAY INHALATION TREATMENT: CPT | Performed by: EMERGENCY MEDICINE

## 2020-03-15 PROCEDURE — 85025 COMPLETE CBC W/AUTO DIFF WBC: CPT | Performed by: EMERGENCY MEDICINE

## 2020-03-15 PROCEDURE — 83036 HEMOGLOBIN GLYCOSYLATED A1C: CPT | Performed by: STUDENT IN AN ORGANIZED HEALTH CARE EDUCATION/TRAINING PROGRAM

## 2020-03-15 PROCEDURE — 71046 X-RAY EXAM CHEST 2 VIEWS: CPT

## 2020-03-15 PROCEDURE — 94760 N-INVAS EAR/PLS OXIMETRY 1: CPT

## 2020-03-15 PROCEDURE — 93005 ELECTROCARDIOGRAM TRACING: CPT

## 2020-03-15 PROCEDURE — 99285 EMERGENCY DEPT VISIT HI MDM: CPT | Performed by: EMERGENCY MEDICINE

## 2020-03-15 PROCEDURE — 36415 COLL VENOUS BLD VENIPUNCTURE: CPT | Performed by: EMERGENCY MEDICINE

## 2020-03-15 PROCEDURE — 84484 ASSAY OF TROPONIN QUANT: CPT | Performed by: EMERGENCY MEDICINE

## 2020-03-15 PROCEDURE — 94644 CONT INHLJ TX 1ST HOUR: CPT

## 2020-03-15 PROCEDURE — 99284 EMERGENCY DEPT VISIT MOD MDM: CPT

## 2020-03-15 RX ORDER — SODIUM CHLORIDE FOR INHALATION 0.9 %
3 VIAL, NEBULIZER (ML) INHALATION ONCE
Status: COMPLETED | OUTPATIENT
Start: 2020-03-15 | End: 2020-03-15

## 2020-03-15 RX ADMIN — IPRATROPIUM BROMIDE 1 MG: 0.5 SOLUTION RESPIRATORY (INHALATION) at 22:03

## 2020-03-15 RX ADMIN — ISODIUM CHLORIDE 3 ML: 0.03 SOLUTION RESPIRATORY (INHALATION) at 22:03

## 2020-03-15 RX ADMIN — ALBUTEROL SULFATE 10 MG: 2.5 SOLUTION RESPIRATORY (INHALATION) at 22:03

## 2020-03-15 NOTE — LETTER
[address] [facility] MED SURG 7  [address] [place] [address]  Dept: [phone]    March 16, 2020     Patient: Jackeline Peterson   YOB: 1965   Date of Visit: 3/15/2020       To Whom it May Concern: Frida Casillasrossana is under my professional care  He was seen in the hospital from 3/15/2020   to 03/16/20  He may return to work on 3/17/2020 without limitations  If you have any questions or concerns, please don't hesitate to call           Sincerely,          John Fung MD

## 2020-03-16 VITALS
HEART RATE: 80 BPM | HEIGHT: 68 IN | OXYGEN SATURATION: 96 % | WEIGHT: 235.89 LBS | RESPIRATION RATE: 18 BRPM | DIASTOLIC BLOOD PRESSURE: 79 MMHG | BODY MASS INDEX: 35.75 KG/M2 | SYSTOLIC BLOOD PRESSURE: 137 MMHG | TEMPERATURE: 98.4 F

## 2020-03-16 LAB
ANION GAP SERPL CALCULATED.3IONS-SCNC: 3 MMOL/L (ref 4–13)
ATRIAL RATE: 92 BPM
BASOPHILS # BLD AUTO: 0.04 THOUSANDS/ΜL (ref 0–0.1)
BASOPHILS NFR BLD AUTO: 0 % (ref 0–1)
BUN SERPL-MCNC: 13 MG/DL (ref 5–25)
CALCIUM SERPL-MCNC: 8.5 MG/DL (ref 8.3–10.1)
CHLORIDE SERPL-SCNC: 112 MMOL/L (ref 100–108)
CHOLEST SERPL-MCNC: 154 MG/DL (ref 50–200)
CO2 SERPL-SCNC: 26 MMOL/L (ref 21–32)
CREAT SERPL-MCNC: 0.8 MG/DL (ref 0.6–1.3)
EOSINOPHIL # BLD AUTO: 0.08 THOUSAND/ΜL (ref 0–0.61)
EOSINOPHIL NFR BLD AUTO: 1 % (ref 0–6)
ERYTHROCYTE [DISTWIDTH] IN BLOOD BY AUTOMATED COUNT: 12.9 % (ref 11.6–15.1)
EST. AVERAGE GLUCOSE BLD GHB EST-MCNC: 105 MG/DL
GFR SERPL CREATININE-BSD FRML MDRD: 101 ML/MIN/1.73SQ M
GLUCOSE SERPL-MCNC: 135 MG/DL (ref 65–140)
HBA1C MFR BLD: 5.3 %
HCT VFR BLD AUTO: 39.3 % (ref 36.5–49.3)
HDLC SERPL-MCNC: 31 MG/DL
HGB BLD-MCNC: 13.1 G/DL (ref 12–17)
IMM GRANULOCYTES # BLD AUTO: 0.15 THOUSAND/UL (ref 0–0.2)
IMM GRANULOCYTES NFR BLD AUTO: 1 % (ref 0–2)
LDLC SERPL CALC-MCNC: 66 MG/DL (ref 0–100)
LYMPHOCYTES # BLD AUTO: 2 THOUSANDS/ΜL (ref 0.6–4.47)
LYMPHOCYTES NFR BLD AUTO: 18 % (ref 14–44)
MCH RBC QN AUTO: 30 PG (ref 26.8–34.3)
MCHC RBC AUTO-ENTMCNC: 33.3 G/DL (ref 31.4–37.4)
MCV RBC AUTO: 90 FL (ref 82–98)
MONOCYTES # BLD AUTO: 0.93 THOUSAND/ΜL (ref 0.17–1.22)
MONOCYTES NFR BLD AUTO: 8 % (ref 4–12)
NEUTROPHILS # BLD AUTO: 7.86 THOUSANDS/ΜL (ref 1.85–7.62)
NEUTS SEG NFR BLD AUTO: 72 % (ref 43–75)
NRBC BLD AUTO-RTO: 0 /100 WBCS
P AXIS: 51 DEGREES
PLATELET # BLD AUTO: 196 THOUSANDS/UL (ref 149–390)
PMV BLD AUTO: 8.9 FL (ref 8.9–12.7)
POTASSIUM SERPL-SCNC: 3.9 MMOL/L (ref 3.5–5.3)
PR INTERVAL: 144 MS
QRS AXIS: 52 DEGREES
QRSD INTERVAL: 76 MS
QT INTERVAL: 312 MS
QTC INTERVAL: 385 MS
RBC # BLD AUTO: 4.37 MILLION/UL (ref 3.88–5.62)
SODIUM SERPL-SCNC: 141 MMOL/L (ref 136–145)
T WAVE AXIS: 52 DEGREES
TRIGL SERPL-MCNC: 284 MG/DL
VENTRICULAR RATE: 92 BPM
WBC # BLD AUTO: 11.06 THOUSAND/UL (ref 4.31–10.16)

## 2020-03-16 PROCEDURE — 99220 PR INITIAL OBSERVATION CARE/DAY 70 MINUTES: CPT | Performed by: HOSPITALIST

## 2020-03-16 PROCEDURE — 94660 CPAP INITIATION&MGMT: CPT

## 2020-03-16 PROCEDURE — 93010 ELECTROCARDIOGRAM REPORT: CPT | Performed by: INTERNAL MEDICINE

## 2020-03-16 PROCEDURE — 94640 AIRWAY INHALATION TREATMENT: CPT

## 2020-03-16 PROCEDURE — 94150 VITAL CAPACITY TEST: CPT

## 2020-03-16 PROCEDURE — 85025 COMPLETE CBC W/AUTO DIFF WBC: CPT | Performed by: STUDENT IN AN ORGANIZED HEALTH CARE EDUCATION/TRAINING PROGRAM

## 2020-03-16 PROCEDURE — 80061 LIPID PANEL: CPT | Performed by: STUDENT IN AN ORGANIZED HEALTH CARE EDUCATION/TRAINING PROGRAM

## 2020-03-16 PROCEDURE — NC001 PR NO CHARGE: Performed by: HOSPITALIST

## 2020-03-16 PROCEDURE — 90732 PPSV23 VACC 2 YRS+ SUBQ/IM: CPT | Performed by: HOSPITALIST

## 2020-03-16 PROCEDURE — 80048 BASIC METABOLIC PNL TOTAL CA: CPT | Performed by: STUDENT IN AN ORGANIZED HEALTH CARE EDUCATION/TRAINING PROGRAM

## 2020-03-16 PROCEDURE — 90471 IMMUNIZATION ADMIN: CPT | Performed by: HOSPITALIST

## 2020-03-16 PROCEDURE — 94760 N-INVAS EAR/PLS OXIMETRY 1: CPT

## 2020-03-16 RX ORDER — ACETAMINOPHEN 325 MG/1
650 TABLET ORAL EVERY 6 HOURS PRN
Status: DISCONTINUED | OUTPATIENT
Start: 2020-03-16 | End: 2020-03-17 | Stop reason: HOSPADM

## 2020-03-16 RX ORDER — AMOXICILLIN 250 MG
1 CAPSULE ORAL
Status: DISCONTINUED | OUTPATIENT
Start: 2020-03-16 | End: 2020-03-17 | Stop reason: HOSPADM

## 2020-03-16 RX ORDER — FLUTICASONE FUROATE AND VILANTEROL 200; 25 UG/1; UG/1
1 POWDER RESPIRATORY (INHALATION) DAILY
Status: DISCONTINUED | OUTPATIENT
Start: 2020-03-17 | End: 2020-03-17 | Stop reason: HOSPADM

## 2020-03-16 RX ORDER — IPRATROPIUM BROMIDE AND ALBUTEROL SULFATE 2.5; .5 MG/3ML; MG/3ML
SOLUTION RESPIRATORY (INHALATION)
Status: COMPLETED
Start: 2020-03-16 | End: 2020-03-16

## 2020-03-16 RX ORDER — LEVALBUTEROL 1.25 MG/.5ML
1.25 SOLUTION, CONCENTRATE RESPIRATORY (INHALATION)
Status: DISCONTINUED | OUTPATIENT
Start: 2020-03-16 | End: 2020-03-16

## 2020-03-16 RX ORDER — GUAIFENESIN 1200 MG/1
1200 TABLET, EXTENDED RELEASE ORAL EVERY 12 HOURS SCHEDULED
Qty: 28 TABLET | Refills: 0 | Status: SHIPPED | OUTPATIENT
Start: 2020-03-16 | End: 2020-08-25

## 2020-03-16 RX ORDER — SENNOSIDES 8.6 MG
1 TABLET ORAL
Status: DISCONTINUED | OUTPATIENT
Start: 2020-03-16 | End: 2020-03-16

## 2020-03-16 RX ORDER — MAGNESIUM SULFATE HEPTAHYDRATE 40 MG/ML
2 INJECTION, SOLUTION INTRAVENOUS ONCE
Status: COMPLETED | OUTPATIENT
Start: 2020-03-16 | End: 2020-03-16

## 2020-03-16 RX ORDER — SODIUM CHLORIDE FOR INHALATION 0.9 %
3 VIAL, NEBULIZER (ML) INHALATION
Status: DISCONTINUED | OUTPATIENT
Start: 2020-03-16 | End: 2020-03-16

## 2020-03-16 RX ORDER — IPRATROPIUM BROMIDE AND ALBUTEROL SULFATE 2.5; .5 MG/3ML; MG/3ML
3 SOLUTION RESPIRATORY (INHALATION)
Status: DISCONTINUED | OUTPATIENT
Start: 2020-03-16 | End: 2020-03-16

## 2020-03-16 RX ORDER — FLUTICASONE FUROATE AND VILANTEROL 200; 25 UG/1; UG/1
1 POWDER RESPIRATORY (INHALATION) DAILY
Qty: 60 EACH | Refills: 0 | Status: SHIPPED | OUTPATIENT
Start: 2020-03-17 | End: 2021-05-13 | Stop reason: SDUPTHER

## 2020-03-16 RX ORDER — ALBUTEROL SULFATE 90 UG/1
2 AEROSOL, METERED RESPIRATORY (INHALATION) EVERY 6 HOURS PRN
Status: DISCONTINUED | OUTPATIENT
Start: 2020-03-16 | End: 2020-03-16

## 2020-03-16 RX ORDER — GABAPENTIN 300 MG/1
300 CAPSULE ORAL 3 TIMES DAILY
Status: DISCONTINUED | OUTPATIENT
Start: 2020-03-16 | End: 2020-03-17 | Stop reason: HOSPADM

## 2020-03-16 RX ORDER — PREDNISONE 20 MG/1
TABLET ORAL
Qty: 20 TABLET | Refills: 0 | Status: SHIPPED | OUTPATIENT
Start: 2020-03-17 | End: 2020-03-31

## 2020-03-16 RX ORDER — METHYLPREDNISOLONE SODIUM SUCCINATE 40 MG/ML
40 INJECTION, POWDER, LYOPHILIZED, FOR SOLUTION INTRAMUSCULAR; INTRAVENOUS EVERY 8 HOURS SCHEDULED
Status: DISCONTINUED | OUTPATIENT
Start: 2020-03-16 | End: 2020-03-17 | Stop reason: HOSPADM

## 2020-03-16 RX ORDER — FLUTICASONE FUROATE AND VILANTEROL 200; 25 UG/1; UG/1
1 POWDER RESPIRATORY (INHALATION) DAILY
Status: DISCONTINUED | OUTPATIENT
Start: 2020-03-16 | End: 2020-03-16

## 2020-03-16 RX ORDER — ALBUTEROL SULFATE 2.5 MG/3ML
2.5 SOLUTION RESPIRATORY (INHALATION) EVERY 4 HOURS PRN
Status: DISCONTINUED | OUTPATIENT
Start: 2020-03-16 | End: 2020-03-16

## 2020-03-16 RX ORDER — ALBUTEROL SULFATE 90 UG/1
2 AEROSOL, METERED RESPIRATORY (INHALATION) EVERY 4 HOURS PRN
Status: DISCONTINUED | OUTPATIENT
Start: 2020-03-16 | End: 2020-03-17 | Stop reason: HOSPADM

## 2020-03-16 RX ORDER — DOCUSATE SODIUM 100 MG/1
100 CAPSULE, LIQUID FILLED ORAL 2 TIMES DAILY
Status: DISCONTINUED | OUTPATIENT
Start: 2020-03-16 | End: 2020-03-16

## 2020-03-16 RX ORDER — PREDNISONE 20 MG/1
40 TABLET ORAL DAILY
Qty: 20 TABLET | Refills: 0 | Status: CANCELLED | OUTPATIENT
Start: 2020-03-16 | End: 2020-03-26

## 2020-03-16 RX ORDER — GUAIFENESIN 600 MG
1200 TABLET, EXTENDED RELEASE 12 HR ORAL EVERY 12 HOURS SCHEDULED
Status: DISCONTINUED | OUTPATIENT
Start: 2020-03-16 | End: 2020-03-17 | Stop reason: HOSPADM

## 2020-03-16 RX ORDER — TRAMADOL HYDROCHLORIDE 50 MG/1
50 TABLET ORAL EVERY 6 HOURS PRN
Status: DISCONTINUED | OUTPATIENT
Start: 2020-03-16 | End: 2020-03-17 | Stop reason: HOSPADM

## 2020-03-16 RX ORDER — LEVALBUTEROL 1.25 MG/.5ML
1.25 SOLUTION, CONCENTRATE RESPIRATORY (INHALATION)
Status: DISCONTINUED | OUTPATIENT
Start: 2020-03-16 | End: 2020-03-17 | Stop reason: HOSPADM

## 2020-03-16 RX ORDER — FLUTICASONE FUROATE AND VILANTEROL 100; 25 UG/1; UG/1
1 POWDER RESPIRATORY (INHALATION) DAILY
Status: DISCONTINUED | OUTPATIENT
Start: 2020-03-16 | End: 2020-03-16

## 2020-03-16 RX ORDER — LEVALBUTEROL 1.25 MG/.5ML
1.25 SOLUTION, CONCENTRATE RESPIRATORY (INHALATION) EVERY 8 HOURS PRN
Qty: 30 ML | Refills: 0 | Status: SHIPPED | OUTPATIENT
Start: 2020-03-16

## 2020-03-16 RX ORDER — BENZONATATE 100 MG/1
100 CAPSULE ORAL 3 TIMES DAILY PRN
Status: DISCONTINUED | OUTPATIENT
Start: 2020-03-16 | End: 2020-03-17 | Stop reason: HOSPADM

## 2020-03-16 RX ADMIN — PNEUMOCOCCAL VACCINE POLYVALENT 0.5 ML
25; 25; 25; 25; 25; 25; 25; 25; 25; 25; 25; 25; 25; 25; 25; 25; 25; 25; 25; 25; 25; 25; 25 INJECTION, SOLUTION INTRAMUSCULAR; SUBCUTANEOUS at 18:14

## 2020-03-16 RX ADMIN — MAGNESIUM SULFATE HEPTAHYDRATE 2 G: 40 INJECTION, SOLUTION INTRAVENOUS at 01:11

## 2020-03-16 RX ADMIN — LEVALBUTEROL HYDROCHLORIDE 1.25 MG: 1.25 SOLUTION, CONCENTRATE RESPIRATORY (INHALATION) at 19:17

## 2020-03-16 RX ADMIN — GABAPENTIN 300 MG: 300 CAPSULE ORAL at 09:15

## 2020-03-16 RX ADMIN — ENOXAPARIN SODIUM 40 MG: 40 INJECTION SUBCUTANEOUS at 09:15

## 2020-03-16 RX ADMIN — METHYLPREDNISOLONE SODIUM SUCCINATE 40 MG: 40 INJECTION, POWDER, FOR SOLUTION INTRAMUSCULAR; INTRAVENOUS at 05:55

## 2020-03-16 RX ADMIN — LEVALBUTEROL HYDROCHLORIDE 1.25 MG: 1.25 SOLUTION, CONCENTRATE RESPIRATORY (INHALATION) at 13:17

## 2020-03-16 RX ADMIN — FLUTICASONE FUROATE AND VILANTEROL TRIFENATATE 1 PUFF: 100; 25 POWDER RESPIRATORY (INHALATION) at 09:15

## 2020-03-16 RX ADMIN — METHYLPREDNISOLONE SODIUM SUCCINATE 40 MG: 40 INJECTION, POWDER, FOR SOLUTION INTRAMUSCULAR; INTRAVENOUS at 14:26

## 2020-03-16 RX ADMIN — IPRATROPIUM BROMIDE AND ALBUTEROL SULFATE 3 ML: 2.5; .5 SOLUTION RESPIRATORY (INHALATION) at 04:57

## 2020-03-16 RX ADMIN — GUAIFENESIN 1200 MG: 600 TABLET, EXTENDED RELEASE ORAL at 12:55

## 2020-03-16 RX ADMIN — IPRATROPIUM BROMIDE 0.5 MG: 0.5 SOLUTION RESPIRATORY (INHALATION) at 19:17

## 2020-03-16 RX ADMIN — IPRATROPIUM BROMIDE 0.5 MG: 0.5 SOLUTION RESPIRATORY (INHALATION) at 13:17

## 2020-03-16 RX ADMIN — GABAPENTIN 300 MG: 300 CAPSULE ORAL at 17:17

## 2020-03-16 NOTE — PLAN OF CARE
Problem: PAIN - ADULT  Goal: Verbalizes/displays adequate comfort level or baseline comfort level  Description  Interventions:  - Encourage patient to monitor pain and request assistance  - Assess pain using appropriate pain scale  - Administer analgesics based on type and severity of pain and evaluate response  - Implement non-pharmacological measures as appropriate and evaluate response  - Consider cultural and social influences on pain and pain management  - Notify physician/advanced practitioner if interventions unsuccessful or patient reports new pain  Outcome: Progressing     Problem: SAFETY ADULT  Goal: Patient will remain free of falls  Description  INTERVENTIONS:  - Assess patient frequently for physical needs  -  Identify cognitive and physical deficits and behaviors that affect risk of falls    -  [place] fall precautions as indicated by assessment   - Educate patient/family on patient safety including physical limitations  - Instruct patient to call for assistance with activity based on assessment  - Modify environment to reduce risk of injury  - Consider OT/PT consult to assist with strengthening/mobility  Outcome: Progressing  Goal: Maintain or return to baseline ADL function  Description  INTERVENTIONS:  -  Assess patient's ability to carry out ADLs; assess patient's baseline for ADL function and identify physical deficits which impact ability to perform ADLs (bathing, care of mouth/teeth, toileting, grooming, dressing, etc )  - Assess/evaluate cause of self-care deficits   - Assess range of motion  - Assess patient's mobility; develop plan if impaired  - Assess patient's need for assistive devices and provide as appropriate  - Encourage maximum independence but intervene and supervise when necessary  - Involve family in performance of ADLs  - Assess for home care needs following discharge   - Consider OT consult to assist with ADL evaluation and planning for discharge  - Provide patient education as appropriate  Outcome: Progressing  Goal: Maintain or return mobility status to optimal level  Description  INTERVENTIONS:  - Assess patient's baseline mobility status (ambulation, transfers, stairs, etc )    - Identify cognitive and physical deficits and behaviors that affect mobility  - Identify mobility aids required to assist with transfers and/or ambulation (gait belt, sit-to-stand, lift, walker, cane, etc )  - [place] fall precautions as indicated by assessment  - Record patient progress and toleration of activity level on Mobility SBAR; progress patient to next Phase/Stage  - Instruct patient to call for assistance with activity based on assessment  - Consider rehabilitation consult to assist with strengthening/weightbearing, etc   Outcome: Progressing     Problem: DISCHARGE PLANNING  Goal: Discharge to home or other facility with appropriate resources  Description  INTERVENTIONS:  - Identify barriers to discharge w/patient and caregiver  - Arrange for needed discharge resources and transportation as appropriate  - Identify discharge learning needs (meds, wound care, etc )  - Arrange for interpretive services to assist at discharge as needed  - Refer to Case Management Department for coordinating discharge planning if the patient needs post-hospital services based on physician/advanced practitioner order or complex needs related to functional status, cognitive ability, or social support system  Outcome: Progressing     Problem: Knowledge Deficit  Goal: Patient/family/caregiver demonstrates understanding of disease process, treatment plan, medications, and discharge instructions  Description  Complete learning assessment and assess knowledge base    Interventions:  - Provide teaching at level of understanding  - Provide teaching via preferred learning methods  Outcome: Progressing     Problem: RESPIRATORY - ADULT  Goal: Achieves optimal ventilation and oxygenation  Description  INTERVENTIONS:  - Assess for changes in respiratory status  - Assess for changes in mentation and behavior  - Position to facilitate oxygenation and minimize respiratory effort  - Oxygen administered by appropriate delivery if ordered  - Initiate smoking cessation education as indicated  - Encourage broncho-pulmonary hygiene including cough, deep breathe, Incentive Spirometry  - Assess the need for suctioning and aspirate as needed  - Assess and instruct to report SOB or any respiratory difficulty  - Respiratory Therapy support as indicated  Outcome: Progressing

## 2020-03-16 NOTE — PROGRESS NOTES
INTERNAL MEDICINE RESIDENCY SENIOR ADMISSION NOTE     Name: Jd Alcaraz   Age & Sex: 47 y o  male   MRN: [id number]  Unit/Bed#: -01   Encounter: [id number]  Primary Care Provider: Ele Macedo MD    Admit to team: SOD Team A    Patient seen and examined  Reviewed H&P per Dr Patience Bosch    Agree with the assessment and plan     Principal Problem:    Mild persistent asthma with acute exacerbation  Active Problems:    KATHLEEN on CPAP    History of pneumothorax    Chronic back pain    Former smoker    Class 2 obesity in adult      Code Status: Level 1 - Full Code  Admission Status: OBSERVATION  Disposition: Patient requires Med/Surg  Expected Length of Stay: NA

## 2020-03-16 NOTE — DISCHARGE SUMMARY
INTERNAL MEDICINE RESIDENCY DISCHARGE SUMMARY     Bita Barger [address]   47 y o  male  MRN: [id number]  Room/Bed: /MS      [facility]    Encounter: [id number]    Principal Problem:    Mild persistent asthma with acute exacerbation  Active Problems:    KATHLEEN on CPAP    History of pneumothorax    Chronic back pain    Former smoker    Class 2 obesity in adult      Acute Asthma Exacerbation   -pt with hx of mild intermittent asthma (full PFTs negative for fixed obstructive dz- follows Pulmonary out pt; recently seen in office 3/11/20)  -Hx of: Recurrent left-sided spontaneous probably secondary pneumothorax with history of bleb, status post VATS w/ bleb resection and pleurodesis (5/2019)- successful expansion of lungs    -was given course of 5 days of PO steroids and started on maintenance therapy with Breo which pt has been compliant with   -pt admits to Positive allergy testing to cats in past  Pt has several animals at home: cat, dog, turtle, fish, frog  Pt admits to construction around work area  No travel  No sick contacts  Quit smoking 4y ago (formerly- 30 pack year smoking hx)  PFTs (1/7/20)- WNL, no sig change with bronchodilators  Pt currently on RA, O2 sat >96%, speaking full sentences   mild diffuse wheezing throughout lung fields, worse in b/l bases   -CXR: negative   -D/C with slow taper   -Nebulizer with albuterol and ipratropium  -increased strength of home inhalers   -cont smoking cessation encouraged  -encourage removing pets from house   -Pneumococcal 23 vaccine provided  -May consider Methacholine challenge test in out pt setting  -may consider obtaining an Echo in near future to r/o other causes for SOB including Pulmonary HTN vs Diastolic HF?      KATHLEEN on CPAP at home  -pt dx in past  -pt compliant on CPAP 8-12 at home  -encourage weight loss, lifestyle habits   -follow up sleep study out pt for our records     Obesity   BMI 37 4  Encouraged weight loss, lifestyle changes  -follow up A1c and lipid panel outpatient     Chronic Low back pain  -tylenol 650mg Q6 prn   -will continue home pain meds: Tramadol 50mg Q6hr and gabapentin 300mg tid for pain  -bowel regimen: Senakote S daily     [address] Ob[address]  Male with a sig PMH of Mild Intermittent Asthma, hx of recurrent spontaneous pneumothorax s/p VATS w Bleb resection and mechanical pleurodesis on 5/28/2019- follows CT surgery out pt), KATHLEEN on CPAP at bedtime, Former tobacco smoker (30 pack-y) and quit 4 y ago, chronic low back pain who presents to ED with complaint of SOB, wheezing and chest tightness  Pt states that this has been going on for last few months but especially worsening a week ago 3/8/20 for which he follow up at Pulmonary clinic 3/11/20  Pt was provided 5day course of PO steroids and started on maintenance therapy with Breo along side his albuterol inhaler and nebulizer  Pt states he has been complaint will all medications and finished PO steroids however SOB has not improved       Pt admits to mild cough which worsens his Chest tightness  Denies any recent sick contacts, travel outside [place], fevers, chills, col/ flu like symptoms, myalgias  Pt states he had allergy testing in the past which showed that he was allergic to cats  Pt states he has several pets at home including a cat, dog, turtle, fish, frog  Also states that construction has been occurring around area where he works  Pt works a desk job selling fresh produce  Denies any unusual exposures otherwise       In the ED, VS stable, afebrile, satting >96% on RA, NAD  Labs: WBC borderline elevated at 11 2 (likely due to outpatient steroid regiment), CXR negative  Pt given duonebs  IV 2mg Mag     Patient examined at bedside  NAD, sitting up in bed  Speaking full sentences  Patient states that he still has some shortness of breath and chest tightness  Otherwise no other complaints at this time   It was explained to patient that without removing trigger in the house his asthma will continue to cause him issues  Patient understands however will unlikely remove path from house  DISCHARGE INFORMATION     PCP at Discharge: Josie Foy     Admitting Provider: Julian Spence MD  Admission Date: 3/15/2020    Discharge Provider: No att  providers found  Discharge Date: 3/16/2020    Discharge Disposition: Home/Self Care  Discharge Condition: stable  Discharge with Lines: no    Discharge Diet: regular diet  Activity Restrictions: none  Test Results Pending at Discharge: none    Discharge Diagnoses:  Principal Problem:    Mild persistent asthma with acute exacerbation  Active Problems:    KATHLEEN on CPAP    History of pneumothorax    Chronic back pain    Former smoker    Class 2 obesity in adult  Resolved Problems:    * No resolved hospital problems  *      Consulting Providers:      Diagnostic & Therapeutic Procedures Performed:  Xr Chest 2 Views    Result Date: 3/15/2020  Impression: No acute cardiopulmonary disease is seen  Other findings as above    Workstation performed: [id number]       Code Status: Prior  Advance Directive & Living Will: <no information>  Power of :    POLST:      Medications:  Discharge Medication List as of 3/16/2020  3:10 PM      STOP taking these medications       fluticasone-vilanterol (BREO ELLIPTA) 100-25 mcg/inh inhaler Comments:   Reason for Stopping:             Discharge Medication List as of 3/16/2020  3:10 PM      START taking these medications    Details   fluticasone-vilanterol (BREO ELLIPTA) 200-25 MCG/INH inhaler Inhale 1 puff daily Rinse mouth after use , Starting Tue 3/17/2020, Normal      Guaifenesin 1200 MG TB12 Take 1 tablet (1,200 mg total) by mouth every 12 (twelve) hours, Starting Mon 3/16/2020, Normal      levalbuterol (XOPENEX) 1 25 mg/0 5 mL nebulizer solution Take 0 5 mL (1 25 mg total) by nebulization every 8 (eight) hours as needed for wheezing, Starting Mon 3/16/2020, Normal           Discharge Medication List as of 3/16/2020  3:10 PM      CONTINUE these medications which have NOT CHANGED    Details   albuterol (Ventolin HFA) 90 mcg/act inhaler Inhale 2 puffs every 6 (six) hours as needed for wheezing, Starting Wed 3/11/2020, Normal      ibuprofen (MOTRIN) 200 mg tablet Take 800 mg by mouth every 6 (six) hours as needed for mild pain , Historical Med      traMADol (ULTRAM) 50 mg tablet Take 50 mg by mouth every 6 (six) hours as needed for moderate pain Chronic back pain, Historical Med      acetaminophen (TYLENOL) 325 mg tablet Take 2 tablets (650 mg total) by mouth every 6 (six) hours as needed for mild pain, Starting Fri 5/31/2019, Normal      docusate sodium (COLACE) 100 mg capsule Take 1 capsule (100 mg total) by mouth 2 (two) times a day While taking narcotic pain medication, Starting Fri 5/31/2019, Normal      gabapentin (NEURONTIN) 300 mg capsule Take 1 capsule (300 mg total) by mouth 3 (three) times a day, Starting Fri 5/31/2019, Normal      tiotropium (SPIRIVA RESPIMAT) 2 5 MCG/ACT AERS inhaler Inhale 2 puffs daily, Starting Tue 3/10/2020, Normal             Allergies: Allergies   Allergen Reactions    Penicillins Hives, Itching and GI Intolerance    Codeine Dizziness     Reports dizziness when received codeine at the age of 13       FOLLOW-UP     PCP Outpatient Follow-up:  yes      Follow up: PCP  Follow up within next: 2 weeks     Consulting Providers Follow-up:  none required     Active Issues Requiring Follow-up:   yes     Issue: SOB/?asthma   Responsible Individual: PCP, pulmonology  What is Needed: methacholine challenge, possible allergy/immunology consult   Follow-up Appointments Arranged: No       Discharge Statement:   I spent 20 minutes minutes discharging the patient  This time was spent on the day of discharge  I had direct contact with the patient on the day of discharge   Additional documentation is required if more than 30 minutes were spent on discharge  Portions of the record may have been created with voice recognition software  Occasional wrong word or "sound a like" substitutions may have occurred due to the inherent limitations of voice recognition software    Read the chart carefully and recognize, using context, where substitutions have occurred     ==  Bhavya Pagan MD  [facility]  Internal Medicine Resident PGY-1

## 2020-03-16 NOTE — DISCHARGE INSTRUCTIONS
Asthma, Ambulatory Care   José Miguel King FA: Asthma  In: Nabeel FJ, ed  The 5-Minute Clinical Consult 2013, 21st ed  [address] [facility], [address] [place], [place], [facility]  Eveline Ortiz G: Asthma: pathophysiology, diagnosis and management  Nurs Stand 2011; 26(5):48-56  National Heart, Lung and Blood [place]: Expert Panel Report 3 (EPR3): Guidelines for the diagnosis and management of asthma  [place]-Angel Chau MD  2012  Available from URL: Carlos walter  As accessed 2013-04-30 © 2014 [address] is for End User's use only and may not be sold, redistributed or otherwise used for commercial purposes  All illustrations and images included in CareNotes® are the copyrighted property of [employer]  or Rishi Belle  The above information is an  only  It is not intended as medical advice for individual conditions or treatments  Talk to your doctor, nurse or pharmacist before following any medical regimen to see if it is safe and effective for you

## 2020-03-16 NOTE — H&P
INTERNAL MEDICINE RESIDENCY ADMISSION H&P     Name: Jenny Cifuentes   Age & Sex: 47 y o  male   MRN: [id number]  Unit/Bed#: -01   Encounter: [id number]  Primary Care Provider: Omar Saucedo MD    Code Status: Level 1 - Full Code  Admission Status: OBSERVATION  Disposition: Patient requires Med/Surg    ASSESSMENT/PLAN     Principal Problem:    Mild persistent asthma with acute exacerbation  Active Problems:    KATHLEEN on CPAP    History of pneumothorax    Chronic back pain    Former smoker    Class 2 obesity in adult      Acute Asthma Exacerbation   -pt with hx of mild intermittent asthma (full PFTs negative for fixed obstructive dz- follows Pulmonary out pt; recently seen in office 3/11/20)  -Hx of: Recurrent left-sided spontaneous probably secondary pneumothorax with history of bleb, status post VATS w/ bleb resection and pleurodesis (5/2019)- successful expansion of lungs    -was given course of 5 days of PO steroids and started on maintenance therapy with Breo which pt has been compliant with   -pt admits to Positive allergy testing to cats in past  Pt has several animals at home: cat, dog, turtle, fish, frog  Pt admits to construction around work area  No travel  No sick contacts  Quit smoking 4y ago (formerly- 30 pack year smoking hx)  -PFTs (1/7/20)- WNL, no sig change with bronchodilators   -pt currently on RA, O2 sat >96%, speaking full sentences   -mild diffuse wheezing throughout lung fields, worse in b/l bases   -CXR: negative   -will give IV solumedrol 40mg Q8hrs  -Nebulizer with albuterol and ipratropium  -will continue home inhalers  -incentive spirometry   -tessalon pearls 100mg tid for cough   -cont smoking cessation encouraged  -encourage removing pets from house   -Pneumococcal 23 vaccine provided  -May consider Methacholine challenge test in out pt setting  -may consider obtaining an Echo in near future to r/o other causes for SOB including Pulmonary HTN vs Diastolic HF?      KATHLEEN on CPAP at home  -pt dx in past  -pt compliant on CPAP 8-12 at home  -encourage weight loss, lifestyle habits   -follow up sleep study out pt for our records     Obesity   BMI 37 4  Encouraged weight loss, lifestyle changes  -follow up A1c and lipid panel    Chronic Low back pain  -tylenol 650mg Q6 prn   -will continue home pain meds: Tramadol 50mg Q6hr and gabapentin 300mg tid for pain  -bowel regimen: Senakote S daily       VTE Pharmacologic Prophylaxis: Enoxaparin (Lovenox)  VTE Mechanical Prophylaxis: sequential compression device    CHIEF COMPLAINT     Chief Complaint   Patient presents with    Wheezing     has been following with drs for wheezing for a week  hx of lung collapse  inhalers have been not helping and he felt hes wheezing is getting worse      HISTORY OF PRESENT ILLNESS     Pt is a 50yo Obese  Male with a sig PMH of Mild Intermittent Asthma, hx of recurrent spontaneous pneumothorax s/p VATS w Bleb resection and mechanical pleurodesis on 5/28/2019- follows CT surgery out pt), KATHLEEN on CPAP at bedtime, Former tobacco smoker (30 pack-y) and quit 4 y ago, chronic low back pain who presents to ED with complaint of SOB, wheezing and chest tightness  Pt states that this has been going on for last few months but especially worsening a week ago 3/8/20 for which he follow up at Pulmonary clinic 3/11/20  Pt was provided 5day course of PO steroids and started on maintenance therapy with Breo along side his albuterol inhaler and nebulizer  Pt states he has been complaint will all medications and finished PO steroids however SOB has not improved  Pt admits to mild cough which worsens his Chest tightness  Denies any recent sick contacts, travel outside [place], fevers, chills, col/ flu like symptoms, myalgias  Pt states he had allergy testing in the past which showed that he was allergic to cats  Pt states he has several pets at home including a cat, dog, turtle, fish, frog   Also states that construction has been occurring around area where he works  Pt works a desk job selling fresh produce  Denies any unusual exposures otherwise  In the ED  -VS stable, afebrile, satting >96% on RA, NAD  -Labs: WBC borderline elevated at 11 2, otherwise wnl  -CXR negative  -pt given duonebs  -IV 2mg Mag    Patient examined at bedside  NAD, sitting up in bed  Speaking full sentences  Patient states that he still has some shortness of breath and chest tightness  Otherwise no other complaints at this time  * I explained to patient that without removing a CT/pets in the house his asthma will continue to cause him issues  Patient understands however will on likely remove path from house  REVIEW OF SYSTEMS     Review of Systems   Constitutional: Negative  Eyes: Negative  Respiratory: Positive for cough, chest tightness, shortness of breath and wheezing  Cardiovascular: Negative  Gastrointestinal: Negative  Endocrine: Negative  Genitourinary: Negative  Musculoskeletal: Positive for back pain  Skin: Negative  Allergic/Immunologic: Negative  Neurological: Negative  Hematological: Negative  Psychiatric/Behavioral: Negative  OBJECTIVE     Vitals:    03/15/20 2139 03/15/20 2141 03/15/20 2300 20 0112   BP: 163/83  119/53 112/66   Pulse: 98  98 86   Resp:    Temp:  (!) 97 4 °F (36 3 °C)     TempSrc:  Oral     SpO2: 96%  97% 96%      Temperature:   Temp (24hrs), Av 4 °F (36 3 °C), Min:97 4 °F (36 3 °C), Max:97 4 °F (36 3 °C)    Temperature: (!) 97 4 °F (36 3 °C)  Intake & Output:  I/O     None        Weights: There is no height or weight on file to calculate BMI  Weight (last 2 days)     None        Physical Exam   Constitutional: He is oriented to person, place, and time  He appears well-developed and well-nourished  No distress  HENT:   Head: Normocephalic and atraumatic  Mouth/Throat: No oropharyngeal exudate  Eyes: Pupils are equal, round, and reactive to light  Conjunctivae and EOM are normal    Neck: Normal range of motion  Neck supple  No JVD present  Cardiovascular: Normal rate, regular rhythm, normal heart sounds and intact distal pulses  No murmur heard  Pulmonary/Chest: Effort normal and breath sounds normal  No respiratory distress  He has no wheezes (Bilateral lung fields, greater in the bases)  Abdominal: Soft  Bowel sounds are normal  He exhibits no distension  There is no tenderness  Musculoskeletal: Normal range of motion  He exhibits no edema, tenderness or deformity  Neurological: He is alert and oriented to person, place, and time  Skin: Skin is warm and dry  Capillary refill takes less than 2 seconds  No rash noted  He is not diaphoretic  No erythema  No pallor  Psychiatric: He has a normal mood and affect  His behavior is normal  Judgment and thought content normal    Nursing note and vitals reviewed  PAST MEDICAL HISTORY     Past Medical History:   Diagnosis Date    Collapsed lung     CPAP (continuous positive airway pressure) dependence     Hypertension     Lumbago     KATHLEEN (obstructive sleep apnea)     Recurrent spontaneous pneumothorax     Right-sided chest pain     Spontaneous pneumothorax     Umbilical hernia      PAST SURGICAL HISTORY     Past Surgical History:   Procedure Laterality Date    BACK SURGERY      IA BRONCHOSCOPY,DIAGNOSTIC N/A 5/28/2019    Procedure: BRONCHOSCOPY FLEXIBLE;  Surgeon: Garima Burnett MD;  Location: [facility];  Service: Thoracic    IA THORACOSCOPY SURG W/PLEURODESIS Left 5/28/2019    Procedure: THORACOSCOPY VIDEO ASSISTED SURGERY (VATS);  MECHANICAL PLEURODESIS; BLEB RESECTION x 2;  Surgeon: Garima Burnett MD;  Location: [facility];  Service: Thoracic    ROTATOR CUFF REPAIR       SOCIAL & FAMILY HISTORY     Social History     Substance and Sexual Activity   Alcohol Use Never    Frequency: Never    Binge frequency: Never     Social History     Substance and Sexual Activity   Drug Use Never     Social History     Tobacco Use   Smoking Status Former Smoker    Packs/day: 1 00    Years: 20     Pack years: 20     Types: Cigarettes    Last attempt to quit: 2016    Years since quittin 2   Smokeless Tobacco Never Used     Family History   Problem Relation Age of Onset    Asthma Mother     Deep vein thrombosis Father      LABORATORY DATA     Labs: I have personally reviewed pertinent reports  Results from last 7 days   Lab Units 03/15/20  2213   WBC Thousand/uL 11 20*   HEMOGLOBIN g/dL 14 8   HEMATOCRIT % 42 9   PLATELETS Thousands/uL 225   NEUTROS PCT % 82*   MONOS PCT % 3*          Invalid input(s): LABALBU                   Results from last 7 days   Lab Units 03/15/20  2213   TROPONIN I ng/mL <0 02     Micro:  No results found for: Ginger Nickels, WOUNDCULT, SPUTUMCULTUR  IMAGING & DIAGNOSTIC TESTS     Imaging: I have personally reviewed pertinent reports  Xr Chest 2 Views    Result Date: 3/15/2020  Impression: No acute cardiopulmonary disease is seen  Other findings as above  Workstation performed: [id number]     EKG, Pathology, and Other Studies: I have personally reviewed pertinent reports  ALLERGIES     Allergies   Allergen Reactions    Penicillins Hives, Itching and GI Intolerance    Codeine Dizziness     Reports dizziness when received codeine at the age of 13     [address] ARRIVAL     Prior to Admission medications    Medication Sig Start Date End Date Taking?  Authorizing Provider   acetaminophen (TYLENOL) 325 mg tablet Take 2 tablets (650 mg total) by mouth every 6 (six) hours as needed for mild pain  Patient not taking: Reported on 2019   Marcy Davey PA-C   albuterol (Ventolin HFA) 90 mcg/act inhaler Inhale 2 puffs every 6 (six) hours as needed for wheezing 3/11/20   Italo Zhao MD   docusate sodium (COLACE) 100 mg capsule Take 1 capsule (100 mg total) by mouth 2 (two) times a day While taking narcotic pain medication  Patient not taking: Reported on 6/14/2019 5/31/19   Frank Nguyen PA-C   fluticasone-vilanterol (BREO ELLIPTA) 100-25 mcg/inh inhaler Inhale 1 puff daily Rinse mouth after use  3/11/20   Sybil De La Torre MD   gabapentin (NEURONTIN) 300 mg capsule Take 1 capsule (300 mg total) by mouth 3 (three) times a day  Patient not taking: Reported on 11/27/2019 5/31/19   Frank Nguyen PA-C   ibuprofen (MOTRIN) 200 mg tablet Take 800 mg by mouth every 6 (six) hours as needed for mild pain     Historical Provider, MD   predniSONE 20 mg tablet Take 2 tablets (40 mg total) by mouth daily for 5 days  Patient not taking: Reported on 3/16/2020 3/11/20 3/16/20  Sybil De La Torre MD   tiotropium (SPIRIVA RESPIMAT) 2 5 MCG/ACT AERS inhaler Inhale 2 puffs daily  Patient not taking: Reported on 3/16/2020 3/10/20   BIJAN Yost   traMADol (ULTRAM) 50 mg tablet Take 50 mg by mouth every 6 (six) hours as needed for moderate pain Chronic back pain    Historical Provider, MD     MEDICATIONS ADMINISTERED IN LAST 24 HOURS     Medication Administration - last 24 hours from 03/15/2020 0207 to 03/16/2020 0207       Date/Time Order Dose Route Action Action by     03/15/2020 2203 albuterol inhalation solution 10 mg 10 mg Nebulization Given Germania Fitch, RT     03/15/2020 2203 ipratropium (ATROVENT) 0 02 % inhalation solution 1 mg 1 mg Nebulization Given Germania Fitch, RT     03/15/2020 2203 sodium chloride 0 9 % inhalation solution 3 mL 3 mL Nebulization Given Germania , RT     03/16/2020 0111 magnesium sulfate 2 g/50 mL IVPB (premix) 2 g 2 g Intravenous New Janice Roman RN        CURRENT MEDICATIONS     Current Facility-Administered Medications:  acetaminophen 650 mg Oral Q6H PRN Adriane Barnett MD   albuterol 2 puff Inhalation Q6H PRN Adriane Barnett MD   albuterol 2 5 mg Nebulization Q4H PRN Adriane Barnett MD   benzonatate 100 mg Oral TID PRN Adriane Barnett MD   docusate sodium 100 mg Oral BID Adriane Barnett MD   enoxaparin 40 mg Subcutaneous Daily Flo Fernandes MD   fluticasone-vilanterol 1 puff Inhalation Daily Flo Fernandes MD   gabapentin 300 mg Oral TID Flo Fernandes MD   ipratropium 0 5 mg Nebulization TID Flo Fernandes MD   magnesium sulfate 2 g Intravenous Once Vita Mcginnis MD   methylPREDNISolone sodium succinate 40 mg Intravenous [facility] Flo Fernandes MD   pneumococcal 23-valent polysaccharide vaccine 0 5 mL Subcutaneous Prior to discharge Mateo Porter MD   tiotropium 18 mcg Inhalation Daily Flo Fernandes MD   traMADol 50 mg Oral Q6H PRN Flo Fernandes MD          acetaminophen 650 mg Q6H PRN   albuterol 2 puff Q6H PRN   albuterol 2 5 mg Q4H PRN   benzonatate 100 mg TID PRN   pneumococcal 23-valent polysaccharide vaccine 0 5 mL Prior to discharge   traMADol 50 mg Q6H PRN     Admission Time  I spent 30 minutes admitting the patient    This involved direct patient contact where I performed a full history and physical, reviewing previous records, and reviewing laboratory and other diagnostic studies     ==  Flo Fernandes MD  Resident, PGY-1  [facility]

## 2020-03-16 NOTE — ED PROVIDER NOTES
Final Diagnosis:  1  Shortness of breath    2  Mild persistent asthma with acute exacerbation        Chief Complaint   Patient presents with    Wheezing     has been following with drs for wheezing for a week  hx of lung collapse  inhalers have been not helping and he felt hes wheezing is getting worse     HPI  46 yo with history of   2x prior pneumothorax  Ended up needing bleb surgery and fixation approx 4 years ago by VATS    Presents with 1-2 weeks of increased SOB, WOB and wheezing  "feels like it did when it collapsed " like a weight here on left sided of chest  No "pain" per se but uncomfrotable  For past few mo has been getting SOB with anything exertional   Saw pulm recently on breo and prednisone 40 daily and rescue inhaler  CT surgeon saw 3-4 mo ago  Patient is using his daily breo and today needed 2x rescue albuterol     Constantly SOB and its worsening  Coughing, nonproductive really  No fevers  No sick contacts  Lives with wife and grandson    No recent travel  - No language barrier    - History obtained from patient  - There are no limitations to the history obtained  - Previous charting was reviewed    PMH:   has a past medical history of Collapsed lung, CPAP (continuous positive airway pressure) dependence, Hypertension, Lumbago, KATHLEEN (obstructive sleep apnea), Recurrent spontaneous pneumothorax, Right-sided chest pain, Spontaneous pneumothorax, and Umbilical hernia  PSH:   has a past surgical history that includes Rotator cuff repair; Back surgery; pr thoracoscopy surg w/pleurodesis (Left, 5/28/2019); and pr bronchoscopy,diagnostic (N/A, 5/28/2019)  Social:  Occasional alcohol, stopped smoking 4 years ago with penumothoraces    ROS:  Review of Systems   Constitutional: Negative for activity change, chills, diaphoresis, fatigue and fever  HENT: Negative for congestion, postnasal drip, rhinorrhea, sneezing, sore throat and trouble swallowing      Eyes: Negative for visual disturbance  Respiratory: Positive for cough, chest tightness, shortness of breath and wheezing  Cardiovascular: Positive for chest pain  Negative for leg swelling  Gastrointestinal: Negative for abdominal distention, abdominal pain, blood in stool, constipation, diarrhea, nausea and vomiting  Genitourinary: Negative for decreased urine volume, dysuria, flank pain, frequency, hematuria and urgency  Skin: Negative for color change and rash  Neurological: Negative for syncope, weakness, light-headedness and headaches  Psychiatric/Behavioral: Negative for confusion and sleep disturbance  All other systems reviewed and are negative  PE:   Vitals:    03/16/20 0541 03/16/20 1317 03/16/20 1542 03/16/20 1917   BP:   137/79    Pulse:   80    Resp:       Temp:   98 4 °F (36 9 °C)    TempSrc:       SpO2:  96% 96% 96%   Weight: 107 kg (235 lb 14 3 oz)      Height:         Vitals reviewed by me  Physical Exam   Constitutional: He is oriented to person, place, and time  He appears well-developed and well-nourished  No distress  HENT:   Head: Normocephalic and atraumatic  Nose: Nose normal    Eyes: Pupils are equal, round, and reactive to light  Conjunctivae and EOM are normal  No scleral icterus  Neck: Normal range of motion  Neck supple  No tracheal deviation present  Cardiovascular: Normal rate, regular rhythm, normal heart sounds and intact distal pulses  No murmur heard  Pulmonary/Chest: Effort normal  No stridor  No respiratory distress  He has wheezes  Abdominal: Soft  Bowel sounds are normal  He exhibits no distension  There is no tenderness  There is no guarding  Musculoskeletal: Normal range of motion  He exhibits no edema, tenderness or deformity  Neurological: He is alert and oriented to person, place, and time  No cranial nerve deficit or sensory deficit  He exhibits normal muscle tone  Skin: Skin is warm and dry  Capillary refill takes less than 2 seconds   He is not diaphoretic  Psychiatric: He has a normal mood and affect  His behavior is normal  Judgment and thought content normal    Nursing note and vitals reviewed  A:  - Nursing note reviewed  Unclear if COPD or asthma  Will start with DELCID neb and mag and reassess    HEART Risk Score      Most Recent Value   Heart Score Risk Calculator   History  0 Filed at: 03/15/2020 2152   ECG  --   Age  1 Filed at: 03/15/2020 2152   Risk Factors  1 Filed at: 03/15/2020 2152   Troponin  --   HEART Score  --                        XR chest 2 views   Final Result      No acute cardiopulmonary disease is seen  Other findings as above  Workstation performed: [id number]           Orders Placed This Encounter   Procedures    XR chest 2 views    CBC and differential    Troponin I    Basic metabolic panel    CBC and differential    Hemoglobin A1C    Lipid Panel with Direct LDL reflex    Diet Regular; Regular House    Nursing communication Continue IV as ordered      Notify admitting physician    Notify admitting physician on arrival    Incentive spirometry    Continuous Pulse Oximetry    Vital Signs per unit routine    Notify physician (specify)    Up with assistance    Daily weights    I/O    Tobacco cessation education    Insert peripheral IV    Maintain IV access    Apply SCD or Foot pumps    Level 1-Full Code: all life saving measures are indicated    Inpatient Consult to Case Management    Respiratory Protocol    Cpap    Airway Clearance Protocol    Peak flow    EKG RESULTS    ECG 12 lead    ECG 12 lead    Place in Observation (expected length of stay for this patient is less than two midnights)    Discharge patient     Labs Reviewed   CBC AND DIFFERENTIAL - Abnormal       Result Value Ref Range Status    WBC 11 20 (*) 4 31 - 10 16 Thousand/uL Final    RBC 4 79  3 88 - 5 62 Million/uL Final    Hemoglobin 14 8  12 0 - 17 0 g/dL Final    Hematocrit 42 9  36 5 - 49 3 % Final    MCV 90 82 - 98 fL Final    MCH 30 9  26 8 - 34 3 pg Final    MCHC 34 5  31 4 - 37 4 g/dL Final    RDW 12 9  11 6 - 15 1 % Final    MPV 9 1  8 9 - 12 7 fL Final    Platelets 054  147 - 390 Thousands/uL Final    nRBC 0  /100 WBCs Final    Neutrophils Relative 82 (*) 43 - 75 % Final    Immat GRANS % 1  0 - 2 % Final    Lymphocytes Relative 13 (*) 14 - 44 % Final    Monocytes Relative 3 (*) 4 - 12 % Final    Eosinophils Relative 1  0 - 6 % Final    Basophils Relative 0  0 - 1 % Final    Neutrophils Absolute 9 29 (*) 1 85 - 7 62 Thousands/µL Final    Immature Grans Absolute 0 10  0 00 - 0 20 Thousand/uL Final    Lymphocytes Absolute 1 44  0 60 - 4 47 Thousands/µL Final    Monocytes Absolute 0 28  0 17 - 1 22 Thousand/µL Final    Eosinophils Absolute 0 07  0 00 - 0 61 Thousand/µL Final    Basophils Absolute 0 02  0 00 - 0 10 Thousands/µL Final   TROPONIN I - Normal    Troponin I <0 02  <=0 04 ng/mL Final    Comment:   [employer] Chemistry analyzer 99% cutoff is > 0 04 ng/mL in network labs     o cTnI 99% cutoff is useful only when applied to patients in the clinical setting of myocardial ischemia   o cTnI 99% cutoff should be interpreted in the context of clinical history, ECG findings and possibly cardiac imaging to establish correct diagnosis  o cTnI 99% cutoff may be suggestive but clearly not indicative of a coronary event without the clinical setting of myocardial ischemia  Procedure Note: EKG  Date/Time: 03/16/20 9:32 PM   Interpreted by: Jeanie Izquierdo  Indications / Diagnosis:   ECG reviewed by me, the ED Provider: yes   The EKG demonstrates:  Rhythm: normal sinus  Intervals: normal intervals  Axis: normal axis  QRS/Blocks: normal QRS  ST Changes: No acute ST Changes, no STD/RANGEL  Final Diagnosis:  1  Shortness of breath    2   Mild persistent asthma with acute exacerbation        P:  - admit for continuous albuterol    Medications   enoxaparin (LOVENOX) subcutaneous injection 40 mg (40 mg Subcutaneous Given 3/16/20 0915)   acetaminophen (TYLENOL) tablet 650 mg (has no administration in time range)   methylPREDNISolone sodium succinate (Solu-MEDROL) injection 40 mg (40 mg Intravenous Given 3/16/20 1426)   benzonatate (TESSALON PERLES) capsule 100 mg (has no administration in time range)   traMADol (ULTRAM) tablet 50 mg (has no administration in time range)   gabapentin (NEURONTIN) capsule 300 mg (300 mg Oral Given 3/16/20 1717)   senna-docusate sodium (SENOKOT S) 8 6-50 mg per tablet 1 tablet (has no administration in time range)   ipratropium (ATROVENT) 0 02 % inhalation solution 0 5 mg (0 5 mg Nebulization Given 3/16/20 1917)   levalbuterol (XOPENEX) inhalation solution 1 25 mg (1 25 mg Nebulization Given 3/16/20 1917)   albuterol (PROVENTIL HFA,VENTOLIN HFA) inhaler 2 puff (has no administration in time range)   fluticasone-vilanterol (BREO ELLIPTA) 200-25 MCG/INH inhaler 1 puff (has no administration in time range)   guaiFENesin (MUCINEX) 12 hr tablet 1,200 mg (1,200 mg Oral Given 3/16/20 1255)   albuterol inhalation solution 10 mg (10 mg Nebulization Given 3/15/20 2203)     And   ipratropium (ATROVENT) 0 02 % inhalation solution 1 mg (1 mg Nebulization Given 3/15/20 2203)     And   sodium chloride 0 9 % inhalation solution 3 mL (3 mL Nebulization Given 3/15/20 2203)   magnesium sulfate 2 g/50 mL IVPB (premix) 2 g (2 g Intravenous New Bag 3/16/20 0111)   pneumococcal 23-valent polysaccharide vaccine (PNEUMOVAX-23) injection 0 5 mL (0 5 mL Subcutaneous Given 3/16/20 1814)   ipratropium-albuterol (DUO-NEB) 0 5-2 5 mg/3 mL inhalation solution **ADS Override Pull** (3 mL  Given 3/16/20 8897)     Time reflects when diagnosis was documented in both MDM as applicable and the Disposition within this note     Time User Action Codes Description Comment    3/16/2020 11:55 AM Buzz Ventura Add [J45 31] Mild persistent asthma with acute exacerbation     3/16/2020 11:57 AM Buzz Ventura Modify [J45 31] Mild persistent asthma with acute exacerbation     3/16/2020  3:02 PM Josias Leos Add [R06 02] Shortness of breath     3/16/2020  3:02 PM Isis Flemingj Modify [R06 02] Shortness of breath       ED Disposition     None      Follow-up Information     Follow up With Specialties Details Why Contact Info    Rosa Norris MD Critical Care Medicine, Pulmonology, Pulmonary Disease Follow up 4/7/2020 1:00 PM Rosa Norris MD [facility] [address] [address]      Brenna Whitaker MD Family Medicine Schedule an appointment as soon as possible for a visit in 1 week(s)  [address]  [id number]  [phone]          Current Discharge Medication List      START taking these medications    Details   fluticasone-vilanterol (BREO ELLIPTA) 200-25 MCG/INH inhaler Inhale 1 puff daily Rinse mouth after use  Qty: 60 each, Refills: 0    Associated Diagnoses: Mild persistent asthma with acute exacerbation; Shortness of breath      Guaifenesin 1200 MG TB12 Take 1 tablet (1,200 mg total) by mouth every 12 (twelve) hours  Qty: 28 tablet, Refills: 0    Associated Diagnoses: Mild persistent asthma with acute exacerbation; Shortness of breath      levalbuterol (XOPENEX) 1 25 mg/0 5 mL nebulizer solution Take 0 5 mL (1 25 mg total) by nebulization every 8 (eight) hours as needed for wheezing  Qty: 30 mL, Refills: 0    Associated Diagnoses: Mild persistent asthma with acute exacerbation; Shortness of breath         CONTINUE these medications which have CHANGED    Details   predniSONE 20 mg tablet Take 3 tablets (60 mg total) by mouth daily for 2 days, THEN 2 tablets (40 mg total) daily for 4 days, THEN 1 tablet (20 mg total) daily for 4 days, THEN 0 5 tablets (10 mg total) daily for 4 days    Qty: 20 tablet, Refills: 0    Associated Diagnoses: Mild persistent asthma with acute exacerbation         CONTINUE these medications which have NOT CHANGED    Details   albuterol (Ventolin HFA) 90 mcg/act inhaler Inhale 2 puffs every 6 (six) hours as needed for wheezing  Qty: 18 g, Refills: 3    Comments: Substitution to a formulary equivalent within the same pharmaceutical class is authorized  Associated Diagnoses: Mild persistent asthma with acute exacerbation      ibuprofen (MOTRIN) 200 mg tablet Take 800 mg by mouth every 6 (six) hours as needed for mild pain       traMADol (ULTRAM) 50 mg tablet Take 50 mg by mouth every 6 (six) hours as needed for moderate pain Chronic back pain      acetaminophen (TYLENOL) 325 mg tablet Take 2 tablets (650 mg total) by mouth every 6 (six) hours as needed for mild pain  Qty: 30 tablet, Refills: 0    Associated Diagnoses: Recurrent spontaneous pneumothorax      docusate sodium (COLACE) 100 mg capsule Take 1 capsule (100 mg total) by mouth 2 (two) times a day While taking narcotic pain medication  Qty: 10 capsule, Refills: 0    Associated Diagnoses: Recurrent spontaneous pneumothorax      gabapentin (NEURONTIN) 300 mg capsule Take 1 capsule (300 mg total) by mouth 3 (three) times a day  Qty: 63 capsule, Refills: 0    Associated Diagnoses: Recurrent spontaneous pneumothorax      tiotropium (SPIRIVA RESPIMAT) 2 5 MCG/ACT AERS inhaler Inhale 2 puffs daily  Qty: 1 Inhaler, Refills: 5    Associated Diagnoses: Chronic obstructive pulmonary disease, unspecified COPD type (HCC)         STOP taking these medications       fluticasone-vilanterol (BREO ELLIPTA) 100-25 mcg/inh inhaler Comments:   Reason for Stopping:             No discharge procedures on file  Prior to Admission Medications   Prescriptions Last Dose Informant Patient Reported?  Taking?   acetaminophen (TYLENOL) 325 mg tablet Not Taking at Unknown time Self No No   Sig: Take 2 tablets (650 mg total) by mouth every 6 (six) hours as needed for mild pain   Patient not taking: Reported on 11/27/2019   albuterol (Ventolin HFA) 90 mcg/act inhaler 3/16/2020  No Yes   Sig: Inhale 2 puffs every 6 (six) hours as needed for wheezing docusate sodium (COLACE) 100 mg capsule Not Taking at Unknown time Self No No   Sig: Take 1 capsule (100 mg total) by mouth 2 (two) times a day While taking narcotic pain medication   Patient not taking: Reported on 6/14/2019   fluticasone-vilanterol (BREO ELLIPTA) 100-25 mcg/inh inhaler 3/16/2020  No Yes   Sig: Inhale 1 puff daily Rinse mouth after use    gabapentin (NEURONTIN) 300 mg capsule Not Taking at Unknown time Self No No   Sig: Take 1 capsule (300 mg total) by mouth 3 (three) times a day   Patient not taking: Reported on 11/27/2019   ibuprofen (MOTRIN) 200 mg tablet 3/16/2020 Self Yes Yes   Sig: Take 800 mg by mouth every 6 (six) hours as needed for mild pain    predniSONE 20 mg tablet Not Taking at Unknown time  No No   Sig: Take 2 tablets (40 mg total) by mouth daily for 5 days   Patient not taking: Reported on 3/16/2020   tiotropium (SPIRIVA RESPIMAT) 2 5 MCG/ACT AERS inhaler Not Taking at Unknown time  No No   Sig: Inhale 2 puffs daily   Patient not taking: Reported on 3/16/2020   traMADol (ULTRAM) 50 mg tablet 3/16/2020 Self Yes Yes   Sig: Take 50 mg by mouth every 6 (six) hours as needed for moderate pain Chronic back pain      Facility-Administered Medications: None       Portions of the record may have been created with voice recognition software  Occasional wrong word or "sound a like" substitutions may have occurred due to the inherent limitations of voice recognition software  Read the chart carefully and recognize, using context, where substitutions have occurred      Electronically signed by:  Savanah Hough, PGY 2, MD Juanis Masters MD  03/16/20 0361

## 2020-03-16 NOTE — ED ATTENDING ATTESTATION
3/15/2020  IZeinab MD, saw and evaluated the patient  I have discussed the patient with the resident/non-physician practitioner and agree with the resident's/non-physician practitioner's findings, Plan of Care, and MDM as documented in the resident's/non-physician practitioner's note, except where noted  All available labs and Radiology studies were reviewed  I was present for key portions of any procedure(s) performed by the resident/non-physician practitioner and I was immediately available to provide assistance  At this point I agree with the current assessment done in the Emergency Department  I have conducted an independent evaluation of this patient a history and physical is as follows:  Chest pain, shortness of breath, wheezing  Patient with history of prior pneumothoraces  Already is on steroids and bronchodilators with no improvement at all  No fevers, chills, or travel  Exam remarkable for diffuse expiratory wheezing  Impression:  COPD exacerbation    Agree with documentation  ED Course         Critical Care Time  Procedures

## 2020-03-16 NOTE — RESPIRATORY THERAPY NOTE
RT Protocol Note  Bishop Dominique Lowe 47 y o  male MRN: [id number]  Unit/Bed#: -01 Encounter: [id number]    Assessment    Principal Problem:    Mild persistent asthma with acute exacerbation  Active Problems:    KATHLEEN on CPAP    History of pneumothorax    Chronic back pain    Former smoker    Class 2 obesity in adult      Home Pulmonary Medications:  Albuterol mdi prn, Breo Ellipta  Home Devices/Therapy: BiPAP/CPAP    Past Medical History:   Diagnosis Date    Collapsed lung     CPAP (continuous positive airway pressure) dependence     Hypertension     Lumbago     KATHLEEN (obstructive sleep apnea)     Recurrent spontaneous pneumothorax     Right-sided chest pain     Spontaneous pneumothorax     Umbilical hernia      Social History     Socioeconomic History    Marital status: /Civil Union     Spouse name: None    Number of children: None    Years of education: None    Highest education level: None   Occupational History    None   Social Needs    Financial resource strain: None    Food insecurity:     Worry: None     Inability: None    Transportation needs:     Medical: None     Non-medical: None   Tobacco Use    Smoking status: Former Smoker     Packs/day: 1 00     Years: 20 00     Pack years: 20 00     Types: Cigarettes     Last attempt to quit: 2016     Years since quittin 2    Smokeless tobacco: Never Used   Substance and Sexual Activity    Alcohol use: Never     Frequency: Never     Binge frequency: Never    Drug use: Never    Sexual activity: None   Lifestyle    Physical activity:     Days per week: None     Minutes per session: None    Stress: None   Relationships    Social connections:     Talks on phone: None     Gets together: None     Attends Oriental orthodox service: None     Active member of club or organization: None     Attends meetings of clubs or organizations: None     Relationship status: None    Intimate partner violence:     Fear of current or ex partner: None Emotionally abused: None     Physically abused: None     Forced sexual activity: None   Other Topics Concern    None   Social History Narrative    None       Subjective         Objective    Physical Exam:   Assessment Type: Assess only  General Appearance: Alert, Awake  Respiratory Pattern: Normal  Chest Assessment: Chest expansion symmetrical  Bilateral Breath Sounds: Diminished, Expiratory wheezes  Cough: Non-productive, Barky  O2 Device: RA    Vitals:  Blood pressure 142/80, pulse 82, temperature (!) 97 4 °F (36 3 °C), temperature source Oral, resp  rate 18, height 5' 8" (1 727 m), weight 107 kg (236 lb 15 9 oz), SpO2 98 %  Imaging and other studies: I have personally reviewed pertinent reports  and I have personally reviewed pertinent films in PACS    O2 Device: RA     Plan    Respiratory Plan: Home Bronchodilator Patient pathway  Airway Clearance Plan: Incentive Spirometer     Resp Comments: (P) Pt  evaluated at bedside per Respiratory/Airway Clearance protocols  Pt  admitted with Asthma/COPD exacerbation  Pt  states he just started talking mdi prn and Breo Ellipta at home for sob/wheezing  He also wears CPAP @ [facility] at home  Pt  given udn Tx  at this time and performed peak Flow pre/post Tx   Pt's breath sounds are diminished with exp  wheezes bilaterally and CXR shows lungs are clear  Will continue Xopenex/Atrovent TID with pre/post peak flow as ordered and per Respiratory protocol  Pt  also demonstrated use of IS very well independently  Will continue IS W/A as ordered per Airway Clearance protocol

## 2020-03-16 NOTE — UTILIZATION REVIEW
Initial Clinical Review    Admission: Date/Time/Statement: Admission Orders (From admission, onward)     Ordered        03/16/20 0100  Place in Observation (expected length of stay for this patient is less than two midnights)  Once                   Orders Placed This Encounter   Procedures    Place in Observation (expected length of stay for this patient is less than two midnights)     Standing Status:   Standing     Number of Occurrences:   1     Order Specific Question:   Admitting Physician     Answer:   Iwona Malone [[id number]]     Order Specific Question:   Level of Care     Answer:   Med Surg [[id number]]     ED Arrival Information     Expected Arrival Acuity Means of Arrival Escorted By Service Admission Type    - 3/15/2020 21:26 Urgent Walk-In Self General Medicine Urgent    Arrival Complaint    wheezing        Chief Complaint   Patient presents with    Wheezing     has been following with drs for wheezing for a week  hx of lung collapse  inhalers have been not helping and he felt hes wheezing is getting worse     Assessment/Plan:     47year old male presents to ed from home for evaluation and treatment of wheezing which has been persistent and worsening for 1 week despite using his inhalers  He was evaluated by his pulmonologist this week and prescribed breo and rescue inhaler  PMHX  Pneumothorax with bleb requiring surgery  On arrival patient reports it feels like his lung has collapsed again  Physical examination significant for WBC 11 20, diminished breath sounds   Chest x ray without acute finding  EKG normal sinus rhythm  Treated in ed with albuterol x1, atrovent x1, iv mag so4  Admit to observation for  Asthma exacerbation  Plan includes respiratory therapy, UDN albuterol and atrovent, tessalon perarls, iv solumedrol, continue cpap ( chronic use at home), pea flow  Respiratory  Therapy evaluation 3-16-20  4:57 am    Pt  evaluated at bedside per Respiratory/Airway Clearance protocols    Pt  admitted with Asthma/COPD exacerbation  Pt  states he just started talking mdi prn and Breo Ellipta at home for sob/wheezing  He also wears CPAP @ HS at home  Pt  given udn Tx  at this time and performed peak Flow pre/post Tx   Pt's breath sounds are diminished with exp  wheezes bilaterally and CXR shows lungs are clear  Will continue Xopenex/Atrovent TID with pre/post peak flow as ordered and per Respiratory protocol  Pt  also demonstrated use of IS very well independently  Will continue IS W/A as ordered per Airway Clearance protocol  ED Triage Vitals   03/15/20 2141 03/15/20 2139 03/15/20 2139 03/15/20 2139 03/15/20 2139   (!) 97 4 °F (36 3 °C) 98 20 163/83 96 %      Oral Monitor         No Pain       03/16/20 107 kg (235 lb 14 3 oz)     Additional Vital Signs:     Date/Time  Temp  Pulse  Resp  BP  MAP (mmHg)  SpO2  O2 Device  Patient Position - Orthostatic VS   03/16/20 0457  --  --  --  --  --  98 %  None (Room air)  --   03/16/20 02:09:39  --  82  18  142/80  101  97 %  --  --   03/16/20 0147  --  --  --  --  --  96 %  None (Room air)  --   03/16/20 0112  --  86  20  112/66  --  96 %  None (Room air)  Sitting   Observation                    03/15/20 2300  --  98  20  119/53  --  97 %  None (Room air)  Sitting       SpO2   98 %   ETCO2 (mmHg)   --   Pre Peak Expiratory Flow Rate LPM   330 LPM   Post Peak Expiratory Flow Rate LPM   340 LPM             Pertinent Labs/Diagnostic Test Results:     Collection Time Result Time Vent R Atrial R AL Int  QRSD Int  QT Int  QTC Int  P Axis QRS Axis T Wave Ax    03/15/20 21:41:19 03/16/20 09:27:32 92 92 144 76 312 385 51 52 52       Normal sinus rhythm  Normal ECG  No previous ECGs available      XR chest 2 views   Final (03/15 2348)      No acute cardiopulmonary disease is seen  Other findings as above           Results from last 7 days   Lab Units 03/16/20  0445 03/15/20  2213   WBC Thousand/uL 11 06* 11 20*   HEMOGLOBIN g/dL 13 1 14 8   HEMATOCRIT % 39 3 42 9   PLATELETS Thousands/uL 196 225   NEUTROS ABS Thousands/µL 7 86* 9 29*         Results from last 7 days   Lab Units 03/16/20  0445   SODIUM mmol/L 141   POTASSIUM mmol/L 3 9   CHLORIDE mmol/L 112*   CO2 mmol/L 26   ANION GAP mmol/L 3*   BUN mg/dL 13   CREATININE mg/dL 0 80   EGFR ml/min/1 73sq m 101   CALCIUM mg/dL 8 5       Results from last 7 days   Lab Units 03/16/20  0445   GLUCOSE RANDOM mg/dL 135         Results from last 7 days   Lab Units 03/15/20  2213   HEMOGLOBIN A1C % 5 3   EAG mg/dl 105       Results from last 7 days   Lab Units 03/15/20  2213   TROPONIN I ng/mL <0 02     ED Treatment:   Medication Administration from 03/15/2020 2126 to 03/16/2020 0131       Date/Time Order Dose Route Action     03/15/2020 2203 albuterol inhalation solution 10 mg 10 mg Nebulization Given     03/15/2020 2203 ipratropium (ATROVENT) 0 02 % inhalation solution 1 mg 1 mg Nebulization Given     03/15/2020 2203 sodium chloride 0 9 % inhalation solution 3 mL 3 mL Nebulization Given     03/16/2020 0111 magnesium sulfate 2 g/50 mL IVPB (premix) 2 g 2 g Intravenous New Bag        Past Medical History:   Diagnosis    Collapsed lung    CPAP (continuous positive airway pressure) dependence    Hypertension    Lumbago    KTAHLEEN (obstructive sleep apnea)    Recurrent spontaneous pneumothorax    Right-sided chest pain    Spontaneous pneumothorax    Umbilical hernia     Present on Admission:   Mild persistent asthma with acute exacerbation   Class 2 obesity in adult   Chronic back pain      Admitting Diagnosis: Wheezing [R06 2]  Age/Sex: 47 y o  male  Admission Orders:  Scheduled Medications:    Medications:  enoxaparin 40 mg Subcutaneous Daily   fluticasone-vilanterol 1 puff Inhalation Daily   gabapentin 300 mg Oral TID   ipratropium 0 5 mg Nebulization TID   levalbuterol 1 25 mg Nebulization TID   methylPREDNISolone sodium succinate 40 mg Intravenous Q8H [address]   senna-docusate sodium 1 tablet Oral HS     Continuous IV Infusions:     PRN Meds:    acetaminophen 650 mg Oral Q6H PRN   albuterol 2 puff Inhalation Q4H PRN   benzonatate 100 mg Oral TID PRN   pneumococcal 23-valent polysaccharide vaccine 0 5 mL Subcutaneous Prior to discharge   traMADol 50 mg Oral Q6H PRN       IP CONSULT TO CASE MANAGEMENT    Network Utilization Review Department  [email or web address]  ATTENTION: Please call with any questions or concerns to [phone] and carefully listen to the prompts so that you are directed to the right person  All voicemails are confidential   Stanley Forrester all requests for admission clinical reviews, approved or denied determinations and any other requests to dedicated fax number below belonging to the campus where the patient is receiving treatment   List of dedicated fax numbers for the Facilities:  [address] [place] [address] DENIALS (Administrative/Medical Necessity) [phone]   [address] (Maternity/NICU/Pediatrics) [phone]   [facility] [phone]   [place] [phone]   Brian Najera [phone]   Tawana Ackerman [phone]   [address] [place] [address] [phone]   [facility]  [phone]   [address] [facility]  [facility] [place] [address] [address] [facility] [phone]

## 2020-03-16 NOTE — SOCIAL WORK
Cm met with patient to review role of CM  Patient presented as alert during conversation  Patient reported that he lives with his wife Natasha Mckinney, [phone] in a ranch style home with no steps to enter and no steps  Patient reported that she was independent with ADLS PTA  Patient denied having any inpatient PT/OT, inpatient MH, substance abuse treatment or [place] [address] services  Patient reported that pharmacy of choice is [facility] in [place]  Patient reportedly works full time and drives prior to admission  Patient's PCP is affiliated with [facility]  Patient is interested in Meds to [facility] when medically stable for dc today  Cm informed that patient is anticipated for dc today or tomorrow  No needs identified at this time  CM reviewed d/c planning process including the following: identifying help at home, patient preference for d/c planning needs, Discharge Lounge, [facility] Meds to Bed program, availability of treatment team to discuss questions or concerns patient and/or family may have regarding understanding medications and recognizing signs and symptoms once discharged  CM also encouraged patient to follow up with all recommended appointments after discharge  Patient advised of importance for patient and family to participate in managing patients medical well being

## 2020-03-23 ENCOUNTER — TELEPHONE (OUTPATIENT)
Dept: SLEEP CENTER | Facility: CLINIC | Age: 55
End: 2020-03-23

## 2020-03-23 NOTE — TELEPHONE ENCOUNTER
----- Message from Angélica Cook DO sent at 3/22/2020  6:12 PM EDT -----  Chart reviewed  Study approved   ----- Message -----  From: Julio Cesar Avery MA  Sent: 3/12/2020   3:57 PM EDT  To: Sleep Medicine [place] Provider    This sleep study needs approval      If approved please sign and return to clerical pool  If denied please include reasons why  Also provide alternative testing if warranted  Please sign and return to clerical pool

## 2020-03-24 ENCOUNTER — TELEPHONE (OUTPATIENT)
Dept: SLEEP CENTER | Facility: CLINIC | Age: 55
End: 2020-03-24

## 2020-04-02 ENCOUNTER — TELEPHONE (OUTPATIENT)
Dept: PULMONOLOGY | Facility: CLINIC | Age: 55
End: 2020-04-02

## 2020-05-06 ENCOUNTER — TELEPHONE (OUTPATIENT)
Dept: PULMONOLOGY | Facility: CLINIC | Age: 55
End: 2020-05-06

## 2020-05-06 ENCOUNTER — TELEPHONE (OUTPATIENT)
Dept: SLEEP CENTER | Facility: CLINIC | Age: 55
End: 2020-05-06

## 2020-05-13 ENCOUNTER — TELEPHONE (OUTPATIENT)
Dept: SLEEP CENTER | Facility: CLINIC | Age: 55
End: 2020-05-13

## 2020-05-20 DIAGNOSIS — Z01.20 ENCOUNTER FOR DENTAL EXAMINATION: ICD-10-CM

## 2020-06-22 ENCOUNTER — TELEPHONE (OUTPATIENT)
Dept: SLEEP CENTER | Facility: CLINIC | Age: 55
End: 2020-06-22

## 2020-06-22 DIAGNOSIS — Z20.822 ENCOUNTER FOR LABORATORY TESTING FOR COVID-19 VIRUS: Primary | ICD-10-CM

## 2020-06-26 DIAGNOSIS — Z20.822 ENCOUNTER FOR LABORATORY TESTING FOR COVID-19 VIRUS: ICD-10-CM

## 2020-06-26 PROCEDURE — U0003 INFECTIOUS AGENT DETECTION BY NUCLEIC ACID (DNA OR RNA); SEVERE ACUTE RESPIRATORY SYNDROME CORONAVIRUS 2 (SARS-COV-2) (CORONAVIRUS DISEASE [COVID-19]), AMPLIFIED PROBE TECHNIQUE, MAKING USE OF HIGH THROUGHPUT TECHNOLOGIES AS DESCRIBED BY CMS-2020-01-R: HCPCS

## 2020-06-28 LAB — SARS-COV-2 RNA SPEC QL NAA+PROBE: NOT DETECTED

## 2020-07-01 ENCOUNTER — HOSPITAL ENCOUNTER (OUTPATIENT)
Dept: SLEEP CENTER | Facility: CLINIC | Age: 55
Discharge: HOME | End: 2020-07-01
Payer: COMMERCIAL

## 2020-07-01 DIAGNOSIS — G47.33 OSA ON CPAP: Chronic | ICD-10-CM

## 2020-07-01 DIAGNOSIS — Z99.89 OSA ON CPAP: Chronic | ICD-10-CM

## 2020-07-01 PROCEDURE — 95811 POLYSOM 6/>YRS CPAP 4/> PARM: CPT

## 2020-07-01 PROCEDURE — 95811 POLYSOM 6/>YRS CPAP 4/> PARM: CPT | Performed by: INTERNAL MEDICINE

## 2020-07-02 NOTE — PROGRESS NOTES
Sleep Study Documentation    Pre-Sleep Study       Sleep testing procedure explained to patient:YES    Patient napped prior to study:NO    [address] [place] worker after 12PM   Caffeine use:NO    Alcohol:Dayshift workers after 5PM: Alcohol use:NO    Typical day for patient:NO       Study Documentation    Sleep Study Indications: The patient experienced hypopneas, obstructive apnea, UAR, and loud snoring with arousals  All stages of sleep were achieved  Occasional PVC's were noted throughout the EKG  Per Dr Livia Esposito, the study was split at an AHI > 10  CPAP was tolerated well with a [employer] [address] [employer] P10 nasal pillow mask at 9cm, with heated humidification  Snoring was eliminated at 9cm on CPAP  Sleep Study: Split Optimal PAP pressure: 9cm  Leak:None  Snore:Eliminated  REM Obtained:yes  Supplemental O2: no    Minimum SaO2 89  Baseline SaO2 93  PAP mask choice standard ResMed Air Fit P10 nasal pillows  PAP mask type:pillows  PAP pressure at which snoring was eliminated 9cm  Mode of Therapy:CPAP  ETCO2:No  CPAP changed to BiPAP:No      EKG abnormalities: yes:  Occasional PVC's noted throughout EKG     EEG abnormalities: no    Sleep Study Recorded < 2 hours: N/A    Sleep Study Recorded > 2 hours but incomplete study: N/A    Sleep Study Recorded 6 hours but no sleep obtained: NO    Patient classification: employed       Post-Sleep Study    Medication used at bedtime or during sleep study:NO    Patient reports time it took to fall asleep:greater than 60 minutes    Patient reports waking up during study:3 or more times  Patient reports returning to sleep in 10 to 30 minutes  Patient reports sleeping 4 to 6 hours without dreaming  Patient reports sleep during study:better than usual    Patient rated sleepiness: Somewhat sleepy or tired    PAP treatment:yes: Post PAP treatment patient reports feeling better and  would wear PAP mask at home

## 2020-07-05 DIAGNOSIS — G47.33 OSA (OBSTRUCTIVE SLEEP APNEA): Primary | Chronic | ICD-10-CM

## 2020-07-06 NOTE — RESULT ENCOUNTER NOTE
Split study reviewed - patient has severe KATHLEEN and auto CPAP 8-20 cmH2O has been ordered  Patient should follow up for compliance in 1-2 months  Can we please schedule him for a follow up appointment?

## 2020-07-09 ENCOUNTER — DOCUMENTATION (OUTPATIENT)
Dept: PULMONOLOGY | Facility: CLINIC | Age: 55
End: 2020-07-09

## 2020-07-09 NOTE — PROGRESS NOTES
I called patient and went over the results of his sleep study with him  Patient already has a cpap at home but he is having a lot of issues with it and he got it through his old insurance  I have faxed the new script to [address] in hopes that his new insurance which he just got last month picks up the cost of the new machine  Script for CPAP machine with supplies and all needed documents have been faxed to [address] per patient's request at [phone]

## 2020-07-15 DIAGNOSIS — J44.9 CHRONIC OBSTRUCTIVE PULMONARY DISEASE, UNSPECIFIED COPD TYPE (HCC): ICD-10-CM

## 2020-07-15 DIAGNOSIS — J93.83 RECURRENT SPONTANEOUS PNEUMOTHORAX: ICD-10-CM

## 2020-07-15 RX ORDER — DOCUSATE SODIUM 100 MG/1
100 CAPSULE, LIQUID FILLED ORAL 2 TIMES DAILY
Qty: 10 CAPSULE | Refills: 0 | OUTPATIENT
Start: 2020-07-15

## 2020-07-15 RX ORDER — GABAPENTIN 300 MG/1
300 CAPSULE ORAL 3 TIMES DAILY
Qty: 63 CAPSULE | Refills: 0 | OUTPATIENT
Start: 2020-07-15

## 2020-07-15 RX ORDER — ACETAMINOPHEN 325 MG/1
650 TABLET ORAL EVERY 6 HOURS PRN
Qty: 30 TABLET | Refills: 0 | OUTPATIENT
Start: 2020-07-15

## 2020-07-21 ENCOUNTER — DOCUMENTATION (OUTPATIENT)
Dept: PULMONOLOGY | Facility: CLINIC | Age: 55
End: 2020-07-21

## 2020-07-21 NOTE — PROGRESS NOTES
Received an email from the pharmacy that the Spiriva needed prior Authorization  Started prior [facility] via [facility]  Key [id number]    Called the insurance for the status of the prior authorization as I have not received anything from 7/21 when I stated it on [employer]  I was informed by the pharmacy department to do all prior authorization via phone as it only takes 24 hours for a reply and the medical department is the only department that is requesting the offices to use the web as it is a third party  I was transferred to [facility] 21 who I gave all of the information to and she stated that I should have an answer within 24 hours  PA ID [id number]  Received approval for the Spiriva 2 5 effective 8/5/20 to 8/5/2021 a quantity of 4 for 34 days

## 2020-08-25 ENCOUNTER — OFFICE VISIT (OUTPATIENT)
Dept: PULMONOLOGY | Facility: CLINIC | Age: 55
End: 2020-08-25
Payer: COMMERCIAL

## 2020-08-25 ENCOUNTER — DOCUMENTATION (OUTPATIENT)
Dept: PULMONOLOGY | Facility: CLINIC | Age: 55
End: 2020-08-25

## 2020-08-25 VITALS
HEIGHT: 68 IN | HEART RATE: 67 BPM | BODY MASS INDEX: 35.61 KG/M2 | DIASTOLIC BLOOD PRESSURE: 80 MMHG | WEIGHT: 235 LBS | TEMPERATURE: 97.3 F | SYSTOLIC BLOOD PRESSURE: 134 MMHG | OXYGEN SATURATION: 98 %

## 2020-08-25 DIAGNOSIS — J45.51 SEVERE PERSISTENT ASTHMA WITH ACUTE EXACERBATION: Primary | ICD-10-CM

## 2020-08-25 DIAGNOSIS — Z99.89 OSA ON CPAP: Chronic | ICD-10-CM

## 2020-08-25 DIAGNOSIS — J45.31 MILD PERSISTENT ASTHMA WITH ACUTE EXACERBATION: ICD-10-CM

## 2020-08-25 DIAGNOSIS — R06.02 SHORTNESS OF BREATH: ICD-10-CM

## 2020-08-25 DIAGNOSIS — E66.09 CLASS 2 OBESITY DUE TO EXCESS CALORIES WITH BODY MASS INDEX (BMI) OF 35.0 TO 35.9 IN ADULT, UNSPECIFIED WHETHER SERIOUS COMORBIDITY PRESENT: ICD-10-CM

## 2020-08-25 DIAGNOSIS — Z87.09 HISTORY OF PNEUMOTHORAX: ICD-10-CM

## 2020-08-25 DIAGNOSIS — Z87.891 FORMER SMOKER: ICD-10-CM

## 2020-08-25 DIAGNOSIS — G47.33 OSA ON CPAP: Chronic | ICD-10-CM

## 2020-08-25 PROBLEM — J45.41 MODERATE PERSISTENT ASTHMA WITH ACUTE EXACERBATION: Status: ACTIVE | Noted: 2020-03-11

## 2020-08-25 PROCEDURE — 99215 OFFICE O/P EST HI 40 MIN: CPT | Performed by: INTERNAL MEDICINE

## 2020-08-25 RX ORDER — CYCLOBENZAPRINE HCL 5 MG
5 TABLET ORAL 3 TIMES DAILY PRN
COMMUNITY

## 2020-08-25 RX ORDER — ALBUTEROL SULFATE 90 UG/1
2 AEROSOL, METERED RESPIRATORY (INHALATION) EVERY 6 HOURS PRN
Qty: 18 G | Refills: 3 | Status: SHIPPED | OUTPATIENT
Start: 2020-08-25 | End: 2021-03-09 | Stop reason: SDUPTHER

## 2020-08-25 NOTE — ASSESSMENT & PLAN NOTE
Based on symptoms and persistent scattered wheezes I believe patient has severe persistent asthma  Will continue Breo 200/25 and will start on Spiriva Respimat 1 25, 2 puffs daily  I told patient to start using his p r n  Albuterol as needed for significant shortness of breath so we can evaluate the severity of his disease  Will check CBC with differential and also [facility] allergy panel  Last CBC in March showed eosinophils of 1% but he was on corticosteroids at that time for asthma exacerbation    I ordered nebulizer machine since his device has broken

## 2020-08-25 NOTE — ASSESSMENT & PLAN NOTE
Encouraged to decrease calorie intake and lose weight and also to exercise as tolerated  I believe his obesity is contributing to his shortness of breath and he understands that

## 2020-08-25 NOTE — ASSESSMENT & PLAN NOTE
History of left-sided recurrent pneumothorax status post VATS 6 years ago  No recurrence since then

## 2020-08-25 NOTE — ASSESSMENT & PLAN NOTE
Patient is compliant with CPAP, I offered him to try a different mask with head strap but he tried in the past   Will continue with the same mask for now

## 2020-08-25 NOTE — PROGRESS NOTES
Script for nebulizer and supplies along with office notes has been faxed to [address]

## 2020-08-25 NOTE — PROGRESS NOTES
Progress note - Pulmonary Medicine   Asuncino Cheadle Squitieri 47 y o  male MRN: [id number]       Impression & Plan:     Severe persistent asthma with acute exacerbation  Based on symptoms and persistent scattered wheezes I believe patient has severe persistent asthma  Will continue Breo 200/25 and will start on Spiriva Respimat 1 25, 2 puffs daily  I told patient to start using his p r n  Albuterol as needed for significant shortness of breath so we can evaluate the severity of his disease  Will check CBC with differential and also [facility] allergy panel  Last CBC in March showed eosinophils of 1% but he was on corticosteroids at that time for asthma exacerbation  I ordered nebulizer machine since his device has broken    KATHLEEN on CPAP  Patient is compliant with CPAP, I offered him to try a different mask with head strap but he tried in the past   Will continue with the same mask for now  Class 2 obesity in adult  Encouraged to decrease calorie intake and lose weight and also to exercise as tolerated  I believe his obesity is contributing to his shortness of breath and he understands that  History of pneumothorax  History of left-sided recurrent pneumothorax status post VATS 6 years ago  No recurrence since then  Former smoker  Fortunately he does not have any obstruction on his PFTs  Diagnoses and all orders for this visit:    Severe persistent asthma with acute exacerbation  -     tiotropium (SPIRIVA RESPIMAT) 1 25 MCG/ACT AERS inhaler; Inhale 2 puffs daily  -     Nebulizer  -     Nebulizer Supplies  -     CBC and differential; Future  -     [facility] Allergy Panel, Adult; Future    KATHLEEN on CPAP    Shortness of breath    Class 2 obesity due to excess calories with body mass index (BMI) of 35 0 to 35 9 in adult, unspecified whether serious comorbidity present    Mild persistent asthma with acute exacerbation  -     albuterol (Ventolin HFA) 90 mcg/act inhaler;  Inhale 2 puffs every 6 (six) hours as needed for wheezing    History of pneumothorax    Former smoker    Other orders  -     cyclobenzaprine (FLEXERIL) 5 mg tablet; Take 5 mg by mouth 3 (three) times a day as needed for muscle spasms      ______________________________________________________________________    HPI:    Marcos Easton presents today for follow-up of asthma  Patient is currently on Breo 200/25 and he still reports shortness of breath/dyspnea on exertion with minimal exertion even taking a shower or putting on his clothes  He does not use his p r n  Albuterol that often because he thought it should be only for emergencies  He uses p r n  Albuterol probably 4 times a week  He gets mild intermittent wheezing and also he has mild nonproductive cough  Sometimes he has chest tightness  He denies fever or chills or night sweats  He denies weight loss  Last exacerbation was in March when he was admitted to the hospital   Patient is has obstructive sleep apnea and he is compliant with his CPAP and is comfortable with his mask, full face mask, he reports sometimes leak because of movement of the mask at night  Patient smoked in the past 1 pack per day for 20 years and quit 6 years ago    Unfortunately patient lost his job and his insurance and currently on states insurance    Current Medications:    Current Outpatient Medications:     acetaminophen (TYLENOL) 325 mg tablet, Take 2 tablets (650 mg total) by mouth every 6 (six) hours as needed for mild pain, Disp: 30 tablet, Rfl: 0    albuterol (Ventolin HFA) 90 mcg/act inhaler, Inhale 2 puffs every 6 (six) hours as needed for wheezing, Disp: 18 g, Rfl: 3    cyclobenzaprine (FLEXERIL) 5 mg tablet, Take 5 mg by mouth 3 (three) times a day as needed for muscle spasms, Disp: , Rfl:     docusate sodium (COLACE) 100 mg capsule, Take 1 capsule (100 mg total) by mouth 2 (two) times a day While taking narcotic pain medication, Disp: 10 capsule, Rfl: 0    fluticasone-vilanterol (BREO ELLIPTA) 200-25 MCG/INH inhaler, Inhale 1 puff daily Rinse mouth after use , Disp: 60 each, Rfl: 0    ibuprofen (MOTRIN) 200 mg tablet, Take 800 mg by mouth every 6 (six) hours as needed for mild pain , Disp: , Rfl:     levalbuterol (XOPENEX) 1 25 mg/0 5 mL nebulizer solution, Take 0 5 mL (1 25 mg total) by nebulization every 8 (eight) hours as needed for wheezing, Disp: 30 mL, Rfl: 0    tiotropium (SPIRIVA RESPIMAT) 2 5 MCG/ACT AERS inhaler, Inhale 2 puffs daily, Disp: 1 Inhaler, Rfl: 2    traMADol (ULTRAM) 50 mg tablet, Take 50 mg by mouth every 6 (six) hours as needed for moderate pain Chronic back pain, Disp: , Rfl:     Review of Systems:  Review of Systems   Constitutional: Negative  HENT: Negative  Eyes: Negative  Respiratory:        As HPI   Cardiovascular: Negative  Gastrointestinal: Negative  Endocrine: Negative  Genitourinary: Negative  Musculoskeletal: Negative  Skin: Negative  Allergic/Immunologic: Negative  Neurological: Negative  Hematological: Negative  Psychiatric/Behavioral: Negative          Past medical history, surgical history, and family history were reviewed and updated as appropriate    Social history updates:  Social History     Tobacco Use   Smoking Status Former Smoker    Packs/day: 1 00    Years: 20 00    Pack years: 20 00    Types: Cigarettes    Start date: 1    Last attempt to quit: 2016    Years since quittin 6   Smokeless Tobacco Never Used       PhysicalExamination:  Vitals:   /80 (BP Location: Left arm, Patient Position: Sitting, Cuff Size: Standard)   Pulse 67   Temp (!) 97 3 °F (36 3 °C) (Temporal)   Ht 5' 8" (1 727 m)   Wt 107 kg (235 lb)   SpO2 98%   BMI 35 73 kg/m²     General: alert, not in acute distress  HEENT: PERRL, no icteric sclera or cyanosis, no thrush  Neck:  Supple, no lymphadenopathy or thyromegaly, no JVD  Lungs:  Equal breath sounds and clear auscultations bilaterally, scattered wheezing, no crackles  Heart: S1S2 regular, no murmures or gallops  Abdomen: soft, non-tender, bowel sounds  present  Extrimities: no edema, no clubbing or cyanosis  Neuro: Alert and oriented x 3, no focal neurodeficits   Skin: intact, no rashes    Diagnostic Data:  Labs: I personally reviewed the most recent laboratory data pertinent to today's visit    Lab Results   Component Value Date    WBC 11 06 (H) 03/16/2020    HGB 13 1 03/16/2020    HCT 39 3 03/16/2020    MCV 90 03/16/2020     03/16/2020     Lab Results   Component Value Date    SODIUM 141 03/16/2020    K 3 9 03/16/2020    CO2 26 03/16/2020     (H) 03/16/2020    BUN 13 03/16/2020    CREATININE 0 80 03/16/2020    CALCIUM 8 5 03/16/2020       PFT results: The most recent pulmonary function tests were reviewed  · Normal Spirometry     · No significant response to the administration to bronchodilator per ATS Standards     · Normal Lung volumes     · Normal diffusion     · Normal flow volume loops    Imaging:  I personally reviewed the images on the [facility] system pertinent to today's visit  Chest x-ray reviewed on PACs:  Clear lungs with mild hyperinflation    Other studies:  CPAP compliance report:  Patient used the device 93 3% in the past 30 days, average usage was 7 hours and 50 minutes, days with usage more than 4 hours 86 7%, average O2 CPAP pressure 16 4  Average leak was 11 minutes and 13 seconds per day    Residual AHI 3 6    Breanne Jaramillo MD

## 2020-09-01 ENCOUNTER — APPOINTMENT (OUTPATIENT)
Dept: LAB | Age: 55
End: 2020-09-01
Payer: COMMERCIAL

## 2020-09-01 DIAGNOSIS — J45.51 SEVERE PERSISTENT ASTHMA WITH ACUTE EXACERBATION: ICD-10-CM

## 2020-09-01 LAB
BASOPHILS # BLD AUTO: 0.05 THOUSANDS/ΜL (ref 0–0.1)
BASOPHILS NFR BLD AUTO: 1 % (ref 0–1)
EOSINOPHIL # BLD AUTO: 0.33 THOUSAND/ΜL (ref 0–0.61)
EOSINOPHIL NFR BLD AUTO: 4 % (ref 0–6)
ERYTHROCYTE [DISTWIDTH] IN BLOOD BY AUTOMATED COUNT: 13.2 % (ref 11.6–15.1)
HCT VFR BLD AUTO: 42 % (ref 36.5–49.3)
HGB BLD-MCNC: 14.1 G/DL (ref 12–17)
IMM GRANULOCYTES # BLD AUTO: 0.07 THOUSAND/UL (ref 0–0.2)
IMM GRANULOCYTES NFR BLD AUTO: 1 % (ref 0–2)
LYMPHOCYTES # BLD AUTO: 2.06 THOUSANDS/ΜL (ref 0.6–4.47)
LYMPHOCYTES NFR BLD AUTO: 26 % (ref 14–44)
MCH RBC QN AUTO: 30.6 PG (ref 26.8–34.3)
MCHC RBC AUTO-ENTMCNC: 33.6 G/DL (ref 31.4–37.4)
MCV RBC AUTO: 91 FL (ref 82–98)
MONOCYTES # BLD AUTO: 0.37 THOUSAND/ΜL (ref 0.17–1.22)
MONOCYTES NFR BLD AUTO: 5 % (ref 4–12)
NEUTROPHILS # BLD AUTO: 5.09 THOUSANDS/ΜL (ref 1.85–7.62)
NEUTS SEG NFR BLD AUTO: 63 % (ref 43–75)
NRBC BLD AUTO-RTO: 0 /100 WBCS
PLATELET # BLD AUTO: 191 THOUSANDS/UL (ref 149–390)
PMV BLD AUTO: 9.7 FL (ref 8.9–12.7)
RBC # BLD AUTO: 4.61 MILLION/UL (ref 3.88–5.62)
WBC # BLD AUTO: 7.97 THOUSAND/UL (ref 4.31–10.16)

## 2020-09-01 PROCEDURE — 86003 ALLG SPEC IGE CRUDE XTRC EA: CPT

## 2020-09-01 PROCEDURE — 85025 COMPLETE CBC W/AUTO DIFF WBC: CPT

## 2020-09-01 PROCEDURE — 82785 ASSAY OF IGE: CPT

## 2020-09-01 PROCEDURE — 36415 COLL VENOUS BLD VENIPUNCTURE: CPT

## 2020-09-02 LAB
A ALTERNATA IGE QN: <0.1 KUA/I
A FUMIGATUS IGE QN: <0.1 KUA/I
ALLERGEN COMMENT: ABNORMAL
BERMUDA GRASS IGE QN: 0.19 KUA/I
BOXELDER IGE QN: <0.1 KUA/I
C HERBARUM IGE QN: <0.1 KUA/I
CAT DANDER IGE QN: 11.6 KUA/I
CMN PIGWEED IGE QN: <0.1 KUA/I
COMMON RAGWEED IGE QN: <0.1 KUA/I
COTTONWOOD IGE QN: <0.1 KUA/I
D FARINAE IGE QN: <0.1 KUA/I
D PTERONYSS IGE QN: <0.1 KUA/I
DOG DANDER IGE QN: 0.7 KUA/I
LONDON PLANE IGE QN: <0.1 KUA/I
MOUSE URINE PROT IGE QN: <0.1 KUA/I
MT JUNIPER IGE QN: <0.1 KUA/I
MUGWORT IGE QN: <0.1 KUA/I
P NOTATUM IGE QN: <0.1 KUA/I
ROACH IGE QN: <0.1 KUA/I
SHEEP SORREL IGE QN: <0.1 KUA/I
SILVER BIRCH IGE QN: <0.1 KUA/I
TIMOTHY IGE QN: 2.72 KUA/I
TOTAL IGE SMQN RAST: 171 KU/L (ref 0–113)
WALNUT IGE QN: <0.1 KUA/I
WHITE ASH IGE QN: 0.23 KUA/I
WHITE ELM IGE QN: <0.1 KUA/I
WHITE MULBERRY IGE QN: <0.1 KUA/I
WHITE OAK IGE QN: <0.1 KUA/I

## 2020-09-12 ENCOUNTER — HOSPITAL ENCOUNTER (EMERGENCY)
Facility: HOSPITAL | Age: 55
Discharge: HOME | End: 2020-09-12
Attending: EMERGENCY MEDICINE | Admitting: EMERGENCY MEDICINE
Payer: COMMERCIAL

## 2020-09-12 ENCOUNTER — APPOINTMENT (EMERGENCY)
Dept: RADIOLOGY | Facility: HOSPITAL | Age: 55
End: 2020-09-12
Payer: COMMERCIAL

## 2020-09-12 VITALS
HEART RATE: 62 BPM | SYSTOLIC BLOOD PRESSURE: 132 MMHG | BODY MASS INDEX: 36.69 KG/M2 | OXYGEN SATURATION: 99 % | TEMPERATURE: 98.4 F | RESPIRATION RATE: 14 BRPM | DIASTOLIC BLOOD PRESSURE: 79 MMHG | HEIGHT: 68 IN | WEIGHT: 242.06 LBS

## 2020-09-12 DIAGNOSIS — R07.89 ATYPICAL CHEST PAIN: Primary | ICD-10-CM

## 2020-09-12 DIAGNOSIS — R06.02 SOB (SHORTNESS OF BREATH): ICD-10-CM

## 2020-09-12 LAB
ALBUMIN SERPL BCP-MCNC: 4.2 G/DL (ref 3.5–5)
ALP SERPL-CCNC: 63 U/L (ref 46–116)
ALT SERPL W P-5'-P-CCNC: 57 U/L (ref 12–78)
ANION GAP SERPL CALCULATED.3IONS-SCNC: 6 MMOL/L (ref 4–13)
AST SERPL W P-5'-P-CCNC: 24 U/L (ref 5–45)
ATRIAL RATE: 82 BPM
BASOPHILS # BLD AUTO: 0.03 THOUSANDS/ΜL (ref 0–0.1)
BASOPHILS NFR BLD AUTO: 0 % (ref 0–1)
BILIRUB SERPL-MCNC: 0.38 MG/DL (ref 0.2–1)
BUN SERPL-MCNC: 15 MG/DL (ref 5–25)
CALCIUM SERPL-MCNC: 8.7 MG/DL (ref 8.3–10.1)
CHLORIDE SERPL-SCNC: 108 MMOL/L (ref 100–108)
CO2 SERPL-SCNC: 25 MMOL/L (ref 21–32)
CREAT SERPL-MCNC: 0.95 MG/DL (ref 0.6–1.3)
EOSINOPHIL # BLD AUTO: 0.31 THOUSAND/ΜL (ref 0–0.61)
EOSINOPHIL NFR BLD AUTO: 3 % (ref 0–6)
ERYTHROCYTE [DISTWIDTH] IN BLOOD BY AUTOMATED COUNT: 12.9 % (ref 11.6–15.1)
GFR SERPL CREATININE-BSD FRML MDRD: 90 ML/MIN/1.73SQ M
GLUCOSE SERPL-MCNC: 118 MG/DL (ref 65–140)
HCT VFR BLD AUTO: 43.7 % (ref 36.5–49.3)
HGB BLD-MCNC: 14.9 G/DL (ref 12–17)
IMM GRANULOCYTES # BLD AUTO: 0.07 THOUSAND/UL (ref 0–0.2)
IMM GRANULOCYTES NFR BLD AUTO: 1 % (ref 0–2)
LYMPHOCYTES # BLD AUTO: 2.32 THOUSANDS/ΜL (ref 0.6–4.47)
LYMPHOCYTES NFR BLD AUTO: 25 % (ref 14–44)
MCH RBC QN AUTO: 30.5 PG (ref 26.8–34.3)
MCHC RBC AUTO-ENTMCNC: 34.1 G/DL (ref 31.4–37.4)
MCV RBC AUTO: 90 FL (ref 82–98)
MONOCYTES # BLD AUTO: 0.77 THOUSAND/ΜL (ref 0.17–1.22)
MONOCYTES NFR BLD AUTO: 8 % (ref 4–12)
NEUTROPHILS # BLD AUTO: 5.85 THOUSANDS/ΜL (ref 1.85–7.62)
NEUTS SEG NFR BLD AUTO: 63 % (ref 43–75)
NRBC BLD AUTO-RTO: 0 /100 WBCS
P AXIS: 49 DEGREES
PLATELET # BLD AUTO: 205 THOUSANDS/UL (ref 149–390)
PMV BLD AUTO: 9.2 FL (ref 8.9–12.7)
POTASSIUM SERPL-SCNC: 4.2 MMOL/L (ref 3.5–5.3)
PR INTERVAL: 160 MS
PROT SERPL-MCNC: 7.6 G/DL (ref 6.4–8.2)
QRS AXIS: 55 DEGREES
QRSD INTERVAL: 76 MS
QT INTERVAL: 344 MS
QTC INTERVAL: 384 MS
RBC # BLD AUTO: 4.88 MILLION/UL (ref 3.88–5.62)
SODIUM SERPL-SCNC: 139 MMOL/L (ref 136–145)
T WAVE AXIS: 56 DEGREES
TROPONIN I SERPL-MCNC: <0.02 NG/ML
TROPONIN I SERPL-MCNC: <0.02 NG/ML
VENTRICULAR RATE: 75 BPM
WBC # BLD AUTO: 9.35 THOUSAND/UL (ref 4.31–10.16)

## 2020-09-12 PROCEDURE — 36415 COLL VENOUS BLD VENIPUNCTURE: CPT | Performed by: EMERGENCY MEDICINE

## 2020-09-12 PROCEDURE — 99284 EMERGENCY DEPT VISIT MOD MDM: CPT | Performed by: EMERGENCY MEDICINE

## 2020-09-12 PROCEDURE — 80053 COMPREHEN METABOLIC PANEL: CPT | Performed by: EMERGENCY MEDICINE

## 2020-09-12 PROCEDURE — 84484 ASSAY OF TROPONIN QUANT: CPT | Performed by: EMERGENCY MEDICINE

## 2020-09-12 PROCEDURE — 99285 EMERGENCY DEPT VISIT HI MDM: CPT

## 2020-09-12 PROCEDURE — 93005 ELECTROCARDIOGRAM TRACING: CPT

## 2020-09-12 PROCEDURE — 85025 COMPLETE CBC W/AUTO DIFF WBC: CPT | Performed by: EMERGENCY MEDICINE

## 2020-09-12 PROCEDURE — 96374 THER/PROPH/DIAG INJ IV PUSH: CPT

## 2020-09-12 PROCEDURE — 71045 X-RAY EXAM CHEST 1 VIEW: CPT

## 2020-09-12 PROCEDURE — 93010 ELECTROCARDIOGRAM REPORT: CPT | Performed by: INTERNAL MEDICINE

## 2020-09-12 RX ORDER — ASPIRIN 81 MG/1
324 TABLET, CHEWABLE ORAL ONCE
Status: COMPLETED | OUTPATIENT
Start: 2020-09-12 | End: 2020-09-12

## 2020-09-12 RX ORDER — ACETAMINOPHEN 325 MG/1
650 TABLET ORAL ONCE
Status: COMPLETED | OUTPATIENT
Start: 2020-09-12 | End: 2020-09-12

## 2020-09-12 RX ORDER — KETOROLAC TROMETHAMINE 30 MG/ML
15 INJECTION, SOLUTION INTRAMUSCULAR; INTRAVENOUS ONCE
Status: COMPLETED | OUTPATIENT
Start: 2020-09-12 | End: 2020-09-12

## 2020-09-12 RX ADMIN — KETOROLAC TROMETHAMINE 15 MG: 30 INJECTION, SOLUTION INTRAMUSCULAR at 16:55

## 2020-09-12 RX ADMIN — ASPIRIN 81 MG CHEWABLE TABLET 324 MG: 81 TABLET CHEWABLE at 15:47

## 2020-09-12 RX ADMIN — ACETAMINOPHEN 650 MG: 325 TABLET, FILM COATED ORAL at 16:55

## 2020-09-12 NOTE — ED ATTENDING ATTESTATION
9/12/2020  Asif PÉREZ MD, saw and evaluated the patient  I have discussed the patient with the resident/non-physician practitioner and agree with the resident's/non-physician practitioner's findings, Plan of Care, and MDM as documented in the resident's/non-physician practitioner's note, except where noted  All available labs and Radiology studies were reviewed  I was present for key portions of any procedure(s) performed by the resident/non-physician practitioner and I was immediately available to provide assistance  At this point I agree with the current assessment done in the Emergency Department  I have conducted an independent evaluation of this patient a history and physical is as follows:    53 y/o M presenting with intermittent chest pressure radiating to the left arm and occasionally the back and neck, with some associated dyspnea which started yesterday while driving  Not exertional   Patient is s/p pleurodesis due to history of multiple spontaneous ptx, states this feels similar to prior ptx  No leg pain or swelling  No history of DVT or PE or of risk factors  Denies fever, chills, cough, abd pain  + family hx of CAD but not in anyone under 61  A&O, NAD  Head AT/NC  Neck supple  Heart RRR, no M/R/G  Lungs CTA/B, no respiratory distress  Abd S/NT/ND  No lower extremity swelling or edema  Moves all extremities normally  XR does not show any evidence of ptx  Initial EKG and troponin with no significant acute abnormality  HEART score 3  Will get delta EKG and trop, re-eval, if doing well with normal delta could be d/c home with cardiology f/u and return precautions      ED Course         Critical Care Time  Procedures

## 2020-09-12 NOTE — ED PROVIDER NOTES
History  Chief Complaint   Patient presents with    Chest Pain     PT "I woke with CP this morning  Its been all on the left side  It just danny sit on the left side all the way over  " Pt c/o CP and SOB     53 yo M presenting for evaluation of CP and SOB that started yesterday and has gradually improved since yesterday  CP is located over the left chest and radiates into his left shoulder, left neck, and left back  Pt can not recall any worsening or relieving factors  Pt states that the pain feels like "a pulled muscle, similar to the two pneumothoraces I've had in the past  Patient has had two pneumothoraces in the past and had a pleurodesis done last year  Symptoms are associated with lightheadedness that was most intense at onset and has since resolved  Patient denies any history of PE, DVT, recent surgeries or hospitalizations, history of malignancy  Pt denies any fever, cough, congestion, abdominal pain, dysuria, nausea, vomiting  Prior to Admission Medications   Prescriptions Last Dose Informant Patient Reported? Taking?   acetaminophen (TYLENOL) 325 mg tablet  Self No Yes   Sig: Take 2 tablets (650 mg total) by mouth every 6 (six) hours as needed for mild pain   albuterol (Ventolin HFA) 90 mcg/act inhaler 9/12/2020 at Unknown time  No Yes   Sig: Inhale 2 puffs every 6 (six) hours as needed for wheezing   cyclobenzaprine (FLEXERIL) 5 mg tablet Past Week at Unknown time  Yes Yes   Sig: Take 5 mg by mouth 3 (three) times a day as needed for muscle spasms   docusate sodium (COLACE) 100 mg capsule Not Taking at Unknown time Self No No   Sig: Take 1 capsule (100 mg total) by mouth 2 (two) times a day While taking narcotic pain medication   Patient not taking: Reported on 9/12/2020   fluticasone-vilanterol (BREO ELLIPTA) 200-25 MCG/INH inhaler 9/12/2020 at Unknown time  No Yes   Sig: Inhale 1 puff daily Rinse mouth after use     ibuprofen (MOTRIN) 200 mg tablet  Self Yes Yes   Sig: Take 800 mg by mouth every 6 (six) hours as needed for mild pain    levalbuterol (XOPENEX) 1 25 mg/0 5 mL nebulizer solution More than a month at Unknown time  No No   Sig: Take 0 5 mL (1 25 mg total) by nebulization every 8 (eight) hours as needed for wheezing   tiotropium (SPIRIVA RESPIMAT) 1 25 MCG/ACT AERS inhaler 2020 at Unknown time  No Yes   Sig: Inhale 2 puffs daily   traMADol (ULTRAM) 50 mg tablet 2020 at Unknown time Self Yes Yes   Sig: Take 50 mg by mouth every 6 (six) hours as needed for moderate pain Chronic back pain      Facility-Administered Medications: None       Past Medical History:   Diagnosis Date    Collapsed lung     CPAP (continuous positive airway pressure) dependence     Hypertension     Lumbago     KATHLEEN (obstructive sleep apnea)     Recurrent spontaneous pneumothorax     Right-sided chest pain     Spontaneous pneumothorax     Umbilical hernia        Past Surgical History:   Procedure Laterality Date    BACK SURGERY      ME BRONCHOSCOPY,DIAGNOSTIC N/A 2019    Procedure: BRONCHOSCOPY FLEXIBLE;  Surgeon: Charles Turcios MD;  [facility]: [facility];  Service: Thoracic    ME THORACOSCOPY SURG W/PLEURODESIS Left 2019    Procedure: THORACOSCOPY VIDEO ASSISTED SURGERY (VATS); MECHANICAL PLEURODESIS; BLEB RESECTION x 2;  Surgeon: Charles Turcios MD;  Location: [facility];  Service: Thoracic    ROTATOR CUFF REPAIR         Family History   Problem Relation Age of Onset    Asthma Mother     Deep vein thrombosis Father      I have reviewed and agree with the history as documented      E-Cigarette/Vaping    E-Cigarette Use Never User      E-Cigarette/Vaping Substances    Nicotine No     THC No     CBD No     Flavoring No     Other No     Unknown No      Social History     Tobacco Use    Smoking status: Former Smoker     Packs/day: 1 00     Years: 20 00     Pack years: 20 00     Types: Cigarettes     Start date:      Last attempt to quit: 2016     Years since quittin 7  Smokeless tobacco: Never Used   Substance Use Topics    Alcohol use: Never     Frequency: Never     Binge frequency: Never    Drug use: Never        Review of Systems   Constitutional: Negative for chills, diaphoresis and fever  HENT: Negative for congestion and sore throat  Eyes: Positive for photophobia  Negative for visual disturbance  Respiratory: Positive for shortness of breath  Negative for cough  Cardiovascular: Positive for chest pain  Negative for palpitations and leg swelling  Gastrointestinal: Negative for abdominal pain, constipation, diarrhea, nausea and vomiting  Genitourinary: Negative for dysuria  Musculoskeletal: Positive for back pain and neck pain  Neurological: Positive for light-headedness  Negative for dizziness, syncope, weakness, numbness and headaches  Physical Exam  ED Triage Vitals   Temperature Pulse Respirations Blood Pressure SpO2   09/12/20 1312 09/12/20 1312 09/12/20 1312 09/12/20 1312 09/12/20 1312   98 4 °F (36 9 °C) 77 22 150/70 99 %      Temp Source Heart Rate Source Patient Position - Orthostatic VS BP Location FiO2 (%)   09/12/20 1312 09/12/20 1312 09/12/20 1312 09/12/20 1312 --   Oral Monitor Sitting Right arm       Pain Score       09/12/20 1549       8             Orthostatic Vital Signs  Vitals:    09/12/20 1549 09/12/20 1550 09/12/20 1603 09/12/20 1729   BP:  132/79 132/79 132/79   Pulse: 82  62 62   Patient Position - Orthostatic VS: Sitting  Lying Lying       Physical Exam  Vitals signs reviewed  Constitutional:       General: He is not in acute distress  Appearance: He is well-developed  He is obese  HENT:      Head: Normocephalic and atraumatic  Eyes:      Extraocular Movements: Extraocular movements intact  Pupils: Pupils are equal, round, and reactive to light  Neck:      Musculoskeletal: Normal range of motion and neck supple  Cardiovascular:      Rate and Rhythm: Normal rate and regular rhythm        Heart sounds: Normal heart sounds  Pulmonary:      Effort: Pulmonary effort is normal  No respiratory distress  Breath sounds: Normal breath sounds  Chest:      Chest wall: No tenderness  Abdominal:      General: Bowel sounds are normal       Palpations: Abdomen is soft  Musculoskeletal:      Right lower leg: No edema  Left lower leg: No edema  Skin:     General: Skin is warm and dry  Capillary Refill: Capillary refill takes less than 2 seconds  Neurological:      General: No focal deficit present  Mental Status: He is alert and oriented to person, place, and time           ED Medications  Medications   aspirin chewable tablet 324 mg (324 mg Oral Given 9/12/20 1547)   ketorolac (TORADOL) injection 15 mg (15 mg Intravenous Given 9/12/20 1655)   acetaminophen (TYLENOL) tablet 650 mg (650 mg Oral Given 9/12/20 1655)       Diagnostic Studies  Results Reviewed     Procedure Component Value Units Date/Time    Troponin I repeat in 3hrs [[id number]]  (Normal) Collected:  09/12/20 1630    Lab Status:  Final result Specimen:  Blood from Arm, Left Updated:  09/12/20 1708     Troponin I <0 02 ng/mL     Troponin I [[id number]]  (Normal) Collected:  09/12/20 1330    Lab Status:  Final result Specimen:  Blood from Arm, Left Updated:  09/12/20 1416     Troponin I <0 02 ng/mL     Comprehensive metabolic panel [[id number]] Collected:  09/12/20 1330    Lab Status:  Final result Specimen:  Blood from Arm, Left Updated:  09/12/20 1400     Sodium 139 mmol/L      Potassium 4 2 mmol/L      Chloride 108 mmol/L      CO2 25 mmol/L      ANION GAP 6 mmol/L      BUN 15 mg/dL      Creatinine 0 95 mg/dL      Glucose 118 mg/dL      Calcium 8 7 mg/dL      AST 24 U/L      ALT 57 U/L      Alkaline Phosphatase 63 U/L      Total Protein 7 6 g/dL      Albumin 4 2 g/dL      Total Bilirubin 0 38 mg/dL      eGFR 90 ml/min/1 73sq m     Narrative:       Heather guidelines for Chronic Kidney Disease (CKD):     Stage 1 with normal or high GFR (GFR > 90 mL/min/1 73 square meters)    Stage 2 Mild CKD (GFR = 60-89 mL/min/1 73 square meters)    Stage 3A Moderate CKD (GFR = 45-59 mL/min/1 73 square meters)    Stage 3B Moderate CKD (GFR = 30-44 mL/min/1 73 square meters)    Stage 4 Severe CKD (GFR = 15-29 mL/min/1 73 square meters)    Stage 5 End Stage CKD (GFR <15 mL/min/1 73 square meters)  Note: GFR calculation is accurate only with a steady state creatinine    CBC and differential [[id number]] Collected:  09/12/20 1330    Lab Status:  Final result Specimen:  Blood from Arm, Left Updated:  09/12/20 1344     WBC 9 35 Thousand/uL      RBC 4 88 Million/uL      Hemoglobin 14 9 g/dL      Hematocrit 43 7 %      MCV 90 fL      MCH 30 5 pg      MCHC 34 1 g/dL      RDW 12 9 %      MPV 9 2 fL      Platelets 878 Thousands/uL      nRBC 0 /100 WBCs      Neutrophils Relative 63 %      Immat GRANS % 1 %      Lymphocytes Relative 25 %      Monocytes Relative 8 %      Eosinophils Relative 3 %      Basophils Relative 0 %      Neutrophils Absolute 5 85 Thousands/µL      Immature Grans Absolute 0 07 Thousand/uL      Lymphocytes Absolute 2 32 Thousands/µL      Monocytes Absolute 0 77 Thousand/µL      Eosinophils Absolute 0 31 Thousand/µL      Basophils Absolute 0 03 Thousands/µL                  XR chest 1 view portable   Final Result by Lindsey Banegas MD (09/12 1414)      No acute cardiopulmonary disease              Workstation performed: [id number]               Procedures  Procedures      ED Course           HEART Risk Score      Most Recent Value   Heart Score Risk Calculator   History  0 Filed at: 09/12/2020 1353   ECG  0 Filed at: 09/12/2020 1353   Age  1 Filed at: 09/12/2020 1353   Risk Factors  1 Filed at: 09/12/2020 1353   Troponin  0 Filed at: 09/12/2020 1353   HEART Score  2 Filed at: 09/12/2020 1353                      SBIRT 20yo+      Most Recent Value   SBIRT (23 yo +)   In order to provide better care to our patients, we are screening all of our patients for alcohol and drug use  Would it be okay to ask you these screening questions? No Filed at: 09/12/2020 1343   Initial Alcohol Screen: US AUDIT-C    1  How often do you have a drink containing alcohol? 2 Filed at: 09/12/2020 1343   3a  Male UNDER 65: How often do you have five or more drinks on one occasion? 2 Filed at: 09/12/2020 1343   Audit-C Score  4 Filed at: 09/12/2020 1343                  MDM  Number of Diagnoses or Management Options  Atypical chest pain:   SOB (shortness of breath):   Diagnosis management comments: 55 yo with PMHx of prior pneumothoraces, HTN presenting for evaluation of CP and SOB that started yesterday  States feels like his prior pneumothoraces  Denies any fevers, cough, congestion  Physical exam reveals equal and symmetric breath sounds  Impression: pneumothorax, ACS, PNA, MSK chest pain    Plan: CBC, CMP, Delta Trop, Delta EKG, CXR     Procedure Note: EKG  Date/Time: 09/12/20 1:46 PM   Interpreted by: Josefina Jacobs DO  Indications / Diagnosis: CP/SOB  ECG reviewed by me, the ED Physician: yes   The EKG demonstrates:  Rhythm: normal sinus  Intervals: normal intervals  Axis: normal axis  QRS/Blocks: normal QRS  ST Changes: No acute ST Changes, no STD/RANGEL  Pt is still having chest pain  Will try toradol and acetaminophen  Procedure Note: EKG  Date/Time: 09/12/20 8:00 PM   Interpreted by: Josefina Jacobs DO  Indications / Diagnosis: CP/ SOB  ECG reviewed by me, the ED Physician: yes   The EKG demonstrates:  Rhythm: normal sinus  Intervals: normal intervals  Axis: normal axis  QRS/Blocks: normal QRS  ST Changes: No acute ST Changes, no STD/RANGEL  Pt's pain has improved on re-evaluation  I reviewed all testing with the patient: Delta troponin and EKGs, CXR  I gave oral return precautions for what to return for in addition to the written return precautions     The patient (and any family present: n/a) verbalized understanding of the discharge instructions and warnings that would necessitate return to the Emergency Department  I specifically highlighted areas of special concern regarding the written and verbal discharge instructions and return precautions  All questions were answered prior to discharge  Disposition  Final diagnoses:   Atypical chest pain   SOB (shortness of breath)     Time reflects when diagnosis was documented in both MDM as applicable and the Disposition within this note     Time User Action Codes Description Comment    9/12/2020  5:22 PM Levonia Massed Add [R07 89] Atypical chest pain     9/12/2020  5:25 PM Levonia Massed Add [R06 02] SOB (shortness of breath)       ED Disposition     ED Disposition Condition Date/Time Comment    Discharge Stable Sat Sep 12, 2020  5:22 PM Ste[facility] Getting Squitieri discharge to home/self care              Follow-up Information     Follow up With Specialties Details Why Contact Info Additional Information    Lucia Smallwood MD Family Medicine  For Emergency Department Follow-up [facility] [address] [facility]       [address] [facility] Emergency Department Emergency Medicine   [address] [id number]  [phone]  ED, [address] [facility] [address], [place], [address] [facility], [address]   [phone]          Discharge Medication List as of 9/12/2020  5:25 PM      CONTINUE these medications which have NOT CHANGED    Details   acetaminophen (TYLENOL) 325 mg tablet Take 2 tablets (650 mg total) by mouth every 6 (six) hours as needed for mild pain, Starting Fri 5/31/2019, Normal      albuterol (Ventolin HFA) 90 mcg/act inhaler Inhale 2 puffs every 6 (six) hours as needed for wheezing, Starting Tue 8/25/2020, Normal      cyclobenzaprine (FLEXERIL) 5 mg tablet Take 5 mg by mouth 3 (three) times a day as needed for muscle spasms, Historical Med      fluticasone-vilanterol (BREO ELLIPTA) 200-25 MCG/INH inhaler Inhale 1 puff daily Rinse mouth after use , Starting Tue 3/17/2020, Normal      ibuprofen (MOTRIN) 200 mg tablet Take 800 mg by mouth every 6 (six) hours as needed for mild pain , Historical Med      tiotropium (SPIRIVA RESPIMAT) 1 25 MCG/ACT AERS inhaler Inhale 2 puffs daily, Starting Tue 8/25/2020, Normal      traMADol (ULTRAM) 50 mg tablet Take 50 mg by mouth every 6 (six) hours as needed for moderate pain Chronic back pain, Historical Med      docusate sodium (COLACE) 100 mg capsule Take 1 capsule (100 mg total) by mouth 2 (two) times a day While taking narcotic pain medication, Starting Fri 5/31/2019, Normal      levalbuterol (XOPENEX) 1 25 mg/0 5 mL nebulizer solution Take 0 5 mL (1 25 mg total) by nebulization every 8 (eight) hours as needed for wheezing, Starting Mon 3/16/2020, Normal           No discharge procedures on file  PDMP Review     None           ED Provider  Attending physically available and evaluated Luis Guzman I managed the patient along with the ED Attending      Electronically Signed by         Ya Carlos DO  09/12/20 2003

## 2020-09-13 LAB
ATRIAL RATE: 61 BPM
ATRIAL RATE: 65 BPM
P AXIS: 45 DEGREES
P AXIS: 48 DEGREES
PR INTERVAL: 150 MS
PR INTERVAL: 168 MS
QRS AXIS: 49 DEGREES
QRS AXIS: 51 DEGREES
QRSD INTERVAL: 76 MS
QRSD INTERVAL: 76 MS
QT INTERVAL: 378 MS
QT INTERVAL: 378 MS
QTC INTERVAL: 380 MS
QTC INTERVAL: 393 MS
T WAVE AXIS: 46 DEGREES
T WAVE AXIS: 50 DEGREES
VENTRICULAR RATE: 61 BPM
VENTRICULAR RATE: 65 BPM

## 2020-09-13 PROCEDURE — 93010 ELECTROCARDIOGRAM REPORT: CPT | Performed by: INTERNAL MEDICINE

## 2021-02-22 ENCOUNTER — OFFICE VISIT (OUTPATIENT)
Dept: PULMONOLOGY | Facility: CLINIC | Age: 56
End: 2021-02-22
Payer: COMMERCIAL

## 2021-02-22 VITALS
SYSTOLIC BLOOD PRESSURE: 130 MMHG | HEART RATE: 85 BPM | BODY MASS INDEX: 36.83 KG/M2 | TEMPERATURE: 98.6 F | OXYGEN SATURATION: 99 % | DIASTOLIC BLOOD PRESSURE: 90 MMHG | HEIGHT: 68 IN | WEIGHT: 243 LBS

## 2021-02-22 DIAGNOSIS — R06.02 SHORTNESS OF BREATH: ICD-10-CM

## 2021-02-22 DIAGNOSIS — J45.51 SEVERE PERSISTENT ASTHMA WITH ACUTE EXACERBATION: Primary | ICD-10-CM

## 2021-02-22 DIAGNOSIS — E66.09 CLASS 2 OBESITY DUE TO EXCESS CALORIES WITH BODY MASS INDEX (BMI) OF 35.0 TO 35.9 IN ADULT, UNSPECIFIED WHETHER SERIOUS COMORBIDITY PRESENT: ICD-10-CM

## 2021-02-22 DIAGNOSIS — G47.33 OSA ON CPAP: Chronic | ICD-10-CM

## 2021-02-22 DIAGNOSIS — Z99.89 OSA ON CPAP: Chronic | ICD-10-CM

## 2021-02-22 PROCEDURE — 99215 OFFICE O/P EST HI 40 MIN: CPT | Performed by: INTERNAL MEDICINE

## 2021-02-22 NOTE — ASSESSMENT & PLAN NOTE
On controlled currently with frequent symptoms regardless of maximum therapy with Breo 200/25 and Spiriva Respimat  Will continue that and will continue Singulair  I spoke to patient about biological injection therapy and based on his labs he qualifies for IL 5 antagonist or even IgE antibodies  I will start patient on Dante Coffer,  Will apply for his insurance company approval  Meanwhile will continue the same inhalers and encouraged patient to lose weight

## 2021-02-22 NOTE — ASSESSMENT & PLAN NOTE
Encouraged patient to decrease calorie intake and exercise to lose weight  I will refer him to  [address] [place] [address] weight management as well

## 2021-02-22 NOTE — PROGRESS NOTES
Progress note - Pulmonary Medicine   Lena Lowe 54 y o  male MRN: [id number]       Impression & Plan:     Severe persistent asthma with acute exacerbation    On controlled currently with frequent symptoms regardless of maximum therapy with Breo 200/25 and Spiriva Respimat  Will continue that and will continue Singulair  I spoke to patient about biological injection therapy and based on his labs he qualifies for IL 5 antagonist or even IgE antibodies  I will start patient on Kaela Bickers,  Will apply for his insurance company approval  Meanwhile will continue the same inhalers and encouraged patient to lose weight  KATHLEEN on CPAP    We were unsuccessful to obtain the compliance report but for now I encouraged patient to continue CPAP and hopefully by controlling his asthma he can not tolerate the CPAP better  Class 2 obesity in adult    Encouraged patient to decrease calorie intake and exercise to lose weight  I will refer him to  [facility] weight management as well  Shortness of breath   Multifactorial including obesity and asthma  Encouraged to lose weight  Diagnoses and all orders for this visit:    Severe persistent asthma with acute exacerbation  -     Discontinue: benralizumab (FASENRA) 30 mg/mL subcutaneous injection  -     benralizumab (FASENRA) subcutaneous injection; Inject 1 mL (30 mg total) under the skin every 28 days for 3 days  -     benralizumab (FASENRA) subcutaneous injection; Inject 1 mL (30 mg total) under the skin every 56 days    KATHLEEN on CPAP    Class 2 obesity due to excess calories with body mass index (BMI) of 35 0 to 35 9 in adult, unspecified whether serious comorbidity present  -     Ambulatory referral to Weight Management; Future    Shortness of breath      ______________________________________________________________________    HPI:    Fish Hidalgo presents today for follow-up of Severe persistent asthma presents today for evaluation      Patient continues to have significant symptoms as he states, with recurrent shortness of breath / dyspnea and chest tightness and sometimes wheezing, he has minimal cough with sputum production, he reports 1 exacerbation of his asthma over the past few months  He is currently on Breo 200/25 and Spiriva Respimat and continues to need his p r n  albuterol frequently  He denies GERD or heartburn, denies allergic rhinitis or postnasal drip  Denies dysphagia  He denies chest pain  Patient has obstructive sleep apnea and he is currently compliant with his CPAP but he has been waking up multiple times to adjust his mask and make it very tight at night  He could not tolerate nasal mask before  He changed his mask recently  Patient also has obesity and he gained some weight since last visit  Was not able to lose weight on his own      Current Medications:    Current Outpatient Medications:     acetaminophen (TYLENOL) 325 mg tablet, Take 2 tablets (650 mg total) by mouth every 6 (six) hours as needed for mild pain, Disp: 30 tablet, Rfl: 0    albuterol (Ventolin HFA) 90 mcg/act inhaler, Inhale 2 puffs every 6 (six) hours as needed for wheezing, Disp: 18 g, Rfl: 3    cyclobenzaprine (FLEXERIL) 5 mg tablet, Take 5 mg by mouth 3 (three) times a day as needed for muscle spasms, Disp: , Rfl:     docusate sodium (COLACE) 100 mg capsule, Take 1 capsule (100 mg total) by mouth 2 (two) times a day While taking narcotic pain medication, Disp: 10 capsule, Rfl: 0    fluticasone-vilanterol (BREO ELLIPTA) 200-25 MCG/INH inhaler, Inhale 1 puff daily Rinse mouth after use , Disp: 60 each, Rfl: 0    ibuprofen (MOTRIN) 200 mg tablet, Take 800 mg by mouth every 6 (six) hours as needed for mild pain , Disp: , Rfl:     levalbuterol (XOPENEX) 1 25 mg/0 5 mL nebulizer solution, Take 0 5 mL (1 25 mg total) by nebulization every 8 (eight) hours as needed for wheezing, Disp: 30 mL, Rfl: 0    tiotropium (SPIRIVA RESPIMAT) 1 25 MCG/ACT AERS inhaler, Inhale 2 puffs daily, Disp: 1 Inhaler, Rfl: 6    traMADol (ULTRAM) 50 mg tablet, Take 50 mg by mouth every 6 (six) hours as needed for moderate pain Chronic back pain, Disp: , Rfl:     Review of Systems:  Review of Systems   Constitutional: Negative  Negative for appetite change and fever  HENT: Negative  Negative for ear pain, postnasal drip, rhinorrhea, sneezing, sore throat and trouble swallowing  Eyes: Negative  Respiratory: Positive for chest tightness and shortness of breath  Cardiovascular: Negative  Negative for chest pain  Gastrointestinal: Negative  Endocrine: Negative  Genitourinary: Negative  Musculoskeletal: Positive for myalgias  Skin: Negative  Allergic/Immunologic: Negative  Neurological: Positive for headaches  Hematological: Negative  Psychiatric/Behavioral: Negative        Aside from what is mentioned in the HPI, the review of systems is otherwise negative    Past medical history, surgical history, and family history were reviewed and updated as appropriate    Social history updates:  Social History     Tobacco Use   Smoking Status Former Smoker    Packs/day: 1 00    Years: 20 00    Pack years: 20 00    Types: Cigarettes    Start date: 1    Quit date:     Years since quittin 1   Smokeless Tobacco Never Used       PhysicalExamination:  Vitals:   /90 (BP Location: Left arm, Patient Position: Sitting, Cuff Size: Standard)   Pulse 85   Temp 98 6 °F (37 °C) (Temporal)   Ht 5' 8" (1 727 m)   Wt 110 kg (243 lb)   SpO2 99%   BMI 36 95 kg/m²     General: alert, not in acute distress  HEENT: PERRL, no icteric sclera or cyanosis, no thrush  Neck:  Supple, no lymphadenopathy or thyromegaly, no JVD  Lungs:  Equal breath sounds and clear auscultations bilaterally, no wheezing or crackles  Heart: S1 S2 regular, no murmurs or gallops  Abdomen: soft, nontender, bowel sounds  present  Extremities: No edema, no clubbing or cyanosis  Neuro: Alert and oriented x 3, no focal neuro deficits   Skin: intact, no rashes    Diagnostic Data:  Labs: I personally reviewed the most recent laboratory data pertinent to today's visit    Lab Results   Component Value Date    WBC 9 35 09/12/2020    HGB 14 9 09/12/2020    HCT 43 7 09/12/2020    MCV 90 09/12/2020     09/12/2020     Lab Results   Component Value Date    SODIUM 139 09/12/2020    K 4 2 09/12/2020    CO2 25 09/12/2020     09/12/2020    BUN 15 09/12/2020    CREATININE 0 95 09/12/2020    CALCIUM 8 7 09/12/2020       Eosinophils in September 2020:  3%/310  IgE total 170    [place] allergy testing significant for allergy to cats mainly      PFT results: The most recent pulmonary function tests were reviewed  · Normal Spirometry     · No significant response to the administration to bronchodilator per ATS Standards     · Normal Lung volumes     · Normal diffusion     · Normal flow volume loops    Imaging:  I personally reviewed the images on the [facility] system pertinent to today's visit  Chest x-ray reviewed on PACs:  Clear lungs    Other studies:  Sleep split study  IMPRESSION:  Mr Martha Mar underwent a split night sleep study  He had an increased number of sleep related respiratory events  which is consistent with severe obstructive sleep apnea (AHI - 84)  During the titration portion of the study he underwent titration of CPAP up to 9 cc of H2O p ressure  However, in  the nonsupine and REM sleep it seemed to be adequate at 8 cc of H2O pressure  In addition, he was previously on  autoPAP 8-20 cc of H2O pressure  Therefore, I recommend a trial of auto-titrating CPAP 8-20 cc of H2O pressure  with heated humidification  Compliance should be evaluated in 1-2 months      Gerome Cockayne, MD  Answers for HPI/ROS submitted by the patient on 2/22/2021   Primary symptoms  Chronicity: chronic  When did you first notice your symptoms?: more than 1 year ago  How often do your symptoms occur?: constantly  Since you first noticed this problem, how has it changed?: unchanged  Do you have shortness of breath that occurs with effort or exertion?: Yes  Do you have ear congestion?: No  Do you have heartburn?: No  Do you have fatigue?: Yes  Do you have nasal congestion?: No  Do you have shortness of breath when lying flat?: Yes  Do you have shortness of breath when you wake up?: Yes  Do you have sweats?: No  Have you experienced weight loss?: No  Which of the following makes your symptoms worse?: any activity, climbing stairs, eating, exercise, lying down, minimal activity, strenuous activity  Which of the following makes your symptoms better?: nothing

## 2021-02-22 NOTE — ASSESSMENT & PLAN NOTE
We were unsuccessful to obtain the compliance report but for now I encouraged patient to continue CPAP and hopefully by controlling his asthma he can not tolerate the CPAP better

## 2021-03-08 ENCOUNTER — TELEPHONE (OUTPATIENT)
Dept: PULMONOLOGY | Facility: CLINIC | Age: 56
End: 2021-03-08

## 2021-03-09 DIAGNOSIS — J45.31 MILD PERSISTENT ASTHMA WITH ACUTE EXACERBATION: ICD-10-CM

## 2021-03-09 RX ORDER — ALBUTEROL SULFATE 90 UG/1
2 AEROSOL, METERED RESPIRATORY (INHALATION) EVERY 6 HOURS PRN
Qty: 18 G | Refills: 5 | Status: SHIPPED | OUTPATIENT
Start: 2021-03-09

## 2021-03-09 RX ORDER — ALBUTEROL SULFATE 90 UG/1
AEROSOL, METERED RESPIRATORY (INHALATION)
Qty: 18 G | Refills: 3 | Status: SHIPPED | OUTPATIENT
Start: 2021-03-09 | End: 2021-12-15

## 2021-03-14 ENCOUNTER — TREATMENT (OUTPATIENT)
Dept: PULMONOLOGY | Facility: CLINIC | Age: 56
End: 2021-03-14

## 2021-03-14 DIAGNOSIS — J45.51 SEVERE PERSISTENT ASTHMA WITH ACUTE EXACERBATION: Primary | ICD-10-CM

## 2021-03-14 RX ORDER — EPINEPHRINE 1 MG/ML
0.3 INJECTION, SOLUTION, CONCENTRATE INTRAVENOUS
Status: CANCELLED | OUTPATIENT
Start: 2021-03-24

## 2021-03-14 RX ORDER — EPINEPHRINE 1 MG/ML
0.3 INJECTION, SOLUTION, CONCENTRATE INTRAVENOUS
Status: CANCELLED | OUTPATIENT
Start: 2021-03-19

## 2021-03-15 DIAGNOSIS — T78.40XA ALLERGY, INITIAL ENCOUNTER: Primary | ICD-10-CM

## 2021-03-15 RX ORDER — EPINEPHRINE 0.3 MG/.3ML
0.3 INJECTION SUBCUTANEOUS ONCE
Qty: 0.6 ML | Refills: 0 | Status: SHIPPED | OUTPATIENT
Start: 2021-03-15 | End: 2022-03-14

## 2021-03-15 NOTE — TELEPHONE ENCOUNTER
Dolores Segura has been approved until 3/11/22  Pt is going to the [facility] for first injection on 3/18 at 1130  Epi Pen has been sent to the local and pt is aware he needs to have this for every appt  Pt is also aware of date, time, and location of the first injection  Approval scanned to chart  Pt will be using [facility] and they will be delivering to the infusion center

## 2021-03-16 NOTE — TELEPHONE ENCOUNTER
Imelda calling from pre encounters stating that the patient was approved for delivery not buy and bill so she's verifying that delivery was arranged   Please advise and call her at [phone]

## 2021-03-17 NOTE — TELEPHONE ENCOUNTER
Delivery is being arranged with [facility], I have informed Imelda of this and the pt  Injection rescheduled for 3/24 so the pharmacy is able to get the medication to the infusion center in time     [address] [facility] [phone]

## 2021-03-18 ENCOUNTER — HOSPITAL ENCOUNTER (OUTPATIENT)
Dept: INFUSION CENTER | Facility: CLINIC | Age: 56
End: 2021-03-18

## 2021-03-22 NOTE — TELEPHONE ENCOUNTER
Imelda calling stating the infusion center still hasn't heard anything about delivery and the patient's appt is tomorrow   She would like a call back at [phone]

## 2021-03-23 NOTE — TELEPHONE ENCOUNTER
Called and spoke with Avel from [address] [place] [address], medication is going to be overnighted to Infusion center for pt to get tomorrow

## 2021-03-23 NOTE — TELEPHONE ENCOUNTER
Imelda from [employer] calling asking about the update on patients [place] delivery  Advised her of this note  She asked if the MA can call the infusion center to let them know because last she spoke with them, they were unaware

## 2021-03-24 ENCOUNTER — HOSPITAL ENCOUNTER (OUTPATIENT)
Dept: INFUSION CENTER | Facility: CLINIC | Age: 56
Discharge: HOME | End: 2021-03-24
Payer: COMMERCIAL

## 2021-03-24 VITALS
RESPIRATION RATE: 18 BRPM | HEART RATE: 67 BPM | DIASTOLIC BLOOD PRESSURE: 72 MMHG | SYSTOLIC BLOOD PRESSURE: 120 MMHG | TEMPERATURE: 98.1 F

## 2021-03-24 DIAGNOSIS — J45.51 SEVERE PERSISTENT ASTHMA WITH ACUTE EXACERBATION: Primary | ICD-10-CM

## 2021-03-24 PROCEDURE — 96372 THER/PROPH/DIAG INJ SC/IM: CPT

## 2021-03-24 RX ORDER — EPINEPHRINE 1 MG/ML
0.3 INJECTION, SOLUTION, CONCENTRATE INTRAVENOUS
Status: CANCELLED | OUTPATIENT
Start: 2021-04-20

## 2021-03-24 RX ORDER — EPINEPHRINE 1 MG/ML
0.3 INJECTION, SOLUTION, CONCENTRATE INTRAVENOUS
Status: CANCELLED | OUTPATIENT
Start: 2021-04-21

## 2021-03-24 RX ORDER — EPINEPHRINE 1 MG/ML
0.3 INJECTION, SOLUTION, CONCENTRATE INTRAVENOUS
Status: DISCONTINUED | OUTPATIENT
Start: 2021-03-24 | End: 2021-03-27 | Stop reason: HOSPADM

## 2021-03-24 RX ADMIN — BENRALIZUMAB 30 MG: 30 INJECTION, SOLUTION SUBCUTANEOUS at 14:53

## 2021-03-24 NOTE — TELEPHONE ENCOUNTER
Patient calling stating he never heard if he's good to get his injection or not   Please call him at [phone]

## 2021-03-24 NOTE — PROGRESS NOTES
Pt given Fasenra injection as ordered  Pt has epipen with him that expires Nov 2022  Pt given instructions on same  Pt observed for 30 min in the infusion center post injection    Pt was provided with future appt and AVS

## 2021-04-20 ENCOUNTER — HOSPITAL ENCOUNTER (OUTPATIENT)
Dept: INFUSION CENTER | Facility: CLINIC | Age: 56
Discharge: HOME | End: 2021-04-20

## 2021-04-22 ENCOUNTER — HOSPITAL ENCOUNTER (OUTPATIENT)
Dept: INFUSION CENTER | Facility: CLINIC | Age: 56
Discharge: HOME | End: 2021-04-22
Payer: COMMERCIAL

## 2021-04-22 VITALS
SYSTOLIC BLOOD PRESSURE: 136 MMHG | TEMPERATURE: 97.8 F | RESPIRATION RATE: 18 BRPM | DIASTOLIC BLOOD PRESSURE: 79 MMHG | HEART RATE: 75 BPM

## 2021-04-22 DIAGNOSIS — J45.51 SEVERE PERSISTENT ASTHMA WITH ACUTE EXACERBATION: Primary | ICD-10-CM

## 2021-04-22 PROCEDURE — 96372 THER/PROPH/DIAG INJ SC/IM: CPT

## 2021-04-22 RX ORDER — EPINEPHRINE 1 MG/ML
0.3 INJECTION, SOLUTION, CONCENTRATE INTRAVENOUS
Status: CANCELLED | OUTPATIENT
Start: 2021-05-19

## 2021-04-22 RX ADMIN — BENRALIZUMAB 30 MG: 30 INJECTION, SOLUTION SUBCUTANEOUS at 14:20

## 2021-04-22 NOTE — PROGRESS NOTES
Pt given Fasenra injection as ordered  Pt has epipen with him that expires Nov 2022  Pt given instructions on same  Pt observed for 30 min in the infusion center post injection    Pt was provided with future appt, declines AVS today

## 2021-04-28 DIAGNOSIS — J45.51 SEVERE PERSISTENT ASTHMA WITH ACUTE EXACERBATION: ICD-10-CM

## 2021-04-28 RX ORDER — BENRALIZUMAB 30 MG/ML
INJECTION, SOLUTION SUBCUTANEOUS
Qty: 1 SYRINGE | Refills: 6 | Status: SHIPPED | OUTPATIENT
Start: 2021-04-28

## 2021-05-13 ENCOUNTER — OFFICE VISIT (OUTPATIENT)
Dept: PULMONOLOGY | Facility: CLINIC | Age: 56
End: 2021-05-13
Payer: COMMERCIAL

## 2021-05-13 VITALS
SYSTOLIC BLOOD PRESSURE: 124 MMHG | HEART RATE: 80 BPM | OXYGEN SATURATION: 98 % | RESPIRATION RATE: 18 BRPM | HEIGHT: 66 IN | TEMPERATURE: 98 F | DIASTOLIC BLOOD PRESSURE: 68 MMHG | BODY MASS INDEX: 39.05 KG/M2 | WEIGHT: 243 LBS

## 2021-05-13 DIAGNOSIS — Z71.89 COUNSELED ABOUT COVID-19 VIRUS INFECTION: ICD-10-CM

## 2021-05-13 DIAGNOSIS — Z99.89 OSA ON CPAP: Chronic | ICD-10-CM

## 2021-05-13 DIAGNOSIS — J45.31 MILD PERSISTENT ASTHMA WITH ACUTE EXACERBATION: ICD-10-CM

## 2021-05-13 DIAGNOSIS — M54.50 CHRONIC BILATERAL LOW BACK PAIN WITHOUT SCIATICA: Chronic | ICD-10-CM

## 2021-05-13 DIAGNOSIS — G47.33 OSA ON CPAP: Chronic | ICD-10-CM

## 2021-05-13 DIAGNOSIS — G89.29 CHRONIC BILATERAL LOW BACK PAIN WITHOUT SCIATICA: Chronic | ICD-10-CM

## 2021-05-13 DIAGNOSIS — R06.02 SHORTNESS OF BREATH: ICD-10-CM

## 2021-05-13 DIAGNOSIS — J45.51 SEVERE PERSISTENT ASTHMA WITH ACUTE EXACERBATION: Primary | ICD-10-CM

## 2021-05-13 DIAGNOSIS — E66.09 CLASS 2 OBESITY DUE TO EXCESS CALORIES WITH BODY MASS INDEX (BMI) OF 35.0 TO 35.9 IN ADULT, UNSPECIFIED WHETHER SERIOUS COMORBIDITY PRESENT: ICD-10-CM

## 2021-05-13 PROCEDURE — 1036F TOBACCO NON-USER: CPT | Performed by: INTERNAL MEDICINE

## 2021-05-13 PROCEDURE — 3008F BODY MASS INDEX DOCD: CPT | Performed by: INTERNAL MEDICINE

## 2021-05-13 PROCEDURE — 99215 OFFICE O/P EST HI 40 MIN: CPT | Performed by: INTERNAL MEDICINE

## 2021-05-13 RX ORDER — ALBUTEROL SULFATE 2.5 MG/3ML
2.5 SOLUTION RESPIRATORY (INHALATION) EVERY 6 HOURS PRN
Qty: 360 ML | Refills: 4 | Status: SHIPPED | OUTPATIENT
Start: 2021-05-13

## 2021-05-13 RX ORDER — FLUTICASONE FUROATE AND VILANTEROL 200; 25 UG/1; UG/1
1 POWDER RESPIRATORY (INHALATION) DAILY
Qty: 60 EACH | Refills: 0 | Status: SHIPPED | OUTPATIENT
Start: 2021-05-13 | End: 2021-05-20

## 2021-05-13 NOTE — ASSESSMENT & PLAN NOTE
This is secondary to asthma and also obesity but also I will check echocardiogram to rule out other factors such as pulmonary hypertension or LV dysfunction although less likely

## 2021-05-13 NOTE — ASSESSMENT & PLAN NOTE
Continue Breo 200/25, will increase Spiriva Respimat to  2 5 mcg daily  Continue immunotherapy with Anjelicaetta Kava  For 6-12 months and then will reassess    I may consider another immunotherapy agent or will consider thermoplasty  In the future

## 2021-05-13 NOTE — ASSESSMENT & PLAN NOTE
Patient was concerned about COVID-19 vaccine given his asthma and shortness of breath, I explained to him that there is no increased risk of adverse reaction in his condition given asthma, and I assured him about safety and efficacy of these vaccines and encouraged him to schedule 1, he agrees

## 2021-05-13 NOTE — PROGRESS NOTES
Progress note - Pulmonary Medicine   Giulianolaura Lowe 54 y o  male MRN: [id number]       Impression & Plan:     Severe persistent asthma with acute exacerbation   Continue Breo 200/25, will increase Spiriva Respimat to  2 5 mcg daily  Continue immunotherapy with Corina Bussing  For 6-12 months and then will reassess  I may consider another immunotherapy agent or will consider thermoplasty  In the future    KATHLEEN on CPAP   Patient is compliant with CPAP, continue q h s  and p r n  Shortness of breath    This is secondary to asthma and also obesity but also I will check echocardiogram to rule out other factors such as pulmonary hypertension or LV dysfunction although less likely  Class 2 obesity in adult    Encouraged patient to decrease calorie intake and exercise  To lose weight to help with shortness of breath    Counseled about COVID-19 virus infection   Patient was concerned about COVID-19 vaccine given his asthma and shortness of breath, I explained to him that there is no increased risk of adverse reaction in his condition given asthma, and I assured him about safety and efficacy of these vaccines and encouraged him to schedule 1, he agrees  Diagnoses and all orders for this visit:    Severe persistent asthma with acute exacerbation  -     tiotropium (SPIRIVA RESPIMAT) 2 5 MCG/ACT AERS inhaler; Inhale 2 puffs daily  -     albuterol (2 5 mg/3 mL) 0 083 % nebulizer solution; Take 1 vial (2 5 mg total) by nebulization every 6 (six) hours as needed for wheezing or shortness of breath    KATHLEEN on CPAP    Chronic bilateral low back pain without sciatica    Shortness of breath  -     Echo complete with contrast if indicated; Future  -     fluticasone-vilanterol (BREO ELLIPTA) 200-25 MCG/INH inhaler; Inhale 1 puff daily Rinse mouth after use      Class 2 obesity due to excess calories with body mass index (BMI) of 35 0 to 35 9 in adult, unspecified whether serious comorbidity present    Mild persistent asthma with acute exacerbation  -     fluticasone-vilanterol (BREO ELLIPTA) 200-25 MCG/INH inhaler; Inhale 1 puff daily Rinse mouth after use  Counseled about COVID-19 virus infection      ______________________________________________________________________    HPI:    Marcos Easton presents today for follow-up of   Severe persistent asthma and KATHLEEN    patient is currently on Breo 200/25 and Spiriva Respimat  Mom 0 25  He was started on Fasenra  About 2 months ago  He does not feel any difference, he continues to have significant dyspnea on exertion and requires p r n  albuterol 2- 3 times daily  Denies exacerbation of his asthma over the past few months  He has mild cough and mild intermittent wheezing  Otherwise denies legs edema or orthopnea  He denies GERD or allergic rhinitis or postnasal drip  He has CPAP and has been compliant every night and also started to use during the day time when he has dyspnea which helps him      Current Medications:    Current Outpatient Medications:     acetaminophen (TYLENOL) 325 mg tablet, Take 2 tablets (650 mg total) by mouth every 6 (six) hours as needed for mild pain, Disp: 30 tablet, Rfl: 0    albuterol (PROVENTIL HFA,VENTOLIN HFA) 90 mcg/act inhaler, inhale 2 puffs by mouth every 6 hours if needed for wheezing, Disp: 18 g, Rfl: 3    albuterol (Ventolin HFA) 90 mcg/act inhaler, Inhale 2 puffs every 6 (six) hours as needed for wheezing, Disp: 18 g, Rfl: 5    [START ON 5/22/2021] benralizumab (FASENRA) subcutaneous injection, Inject 1 mL (30 mg total) under the skin every 56 days, Disp: 1 Syringe, Rfl: 5    cyclobenzaprine (FLEXERIL) 5 mg tablet, Take 5 mg by mouth 3 (three) times a day as needed for muscle spasms, Disp: , Rfl:     docusate sodium (COLACE) 100 mg capsule, Take 1 capsule (100 mg total) by mouth 2 (two) times a day While taking narcotic pain medication, Disp: 10 capsule, Rfl: 0    Fasenra subcutaneous injection, INJECT 1 SYRINGE UNDER THE SKIN AT [facility] 0, 4 AND 8, THEN INJECT 1 SYRINGE EVERY 8 WEEKS THEREAFTER , Disp: 1 Syringe, Rfl: 6    fluticasone-vilanterol (BREO ELLIPTA) 200-25 MCG/INH inhaler, Inhale 1 puff daily Rinse mouth after use , Disp: 60 each, Rfl: 0    ibuprofen (MOTRIN) 200 mg tablet, Take 800 mg by mouth every 6 (six) hours as needed for mild pain , Disp: , Rfl:     levalbuterol (XOPENEX) 1 25 mg/0 5 mL nebulizer solution, Take 0 5 mL (1 25 mg total) by nebulization every 8 (eight) hours as needed for wheezing, Disp: 30 mL, Rfl: 0    tiotropium (SPIRIVA RESPIMAT) 1 25 MCG/ACT AERS inhaler, Inhale 2 puffs daily, Disp: 1 Inhaler, Rfl: 6    traMADol (ULTRAM) 50 mg tablet, Take 50 mg by mouth every 6 (six) hours as needed for moderate pain Chronic back pain, Disp: , Rfl:     EPINEPHrine (EPIPEN) 0 3 mg/0 3 mL SOAJ, Inject 0 3 mL (0 3 mg total) into a muscle once for 1 dose, Disp: 0 6 mL, Rfl: 0    Review of Systems:  Review of Systems   Constitutional: Positive for appetite change  HENT: Positive for sneezing  Respiratory: Positive for shortness of breath and wheezing  Cardiovascular: Positive for chest pain  Musculoskeletal: Positive for myalgias  Neurological: Positive for headaches       Aside from what is mentioned in the HPI, the review of systems is otherwise negative    Past medical history, surgical history, and family history were reviewed and updated as appropriate    Social history updates:  Social History     Tobacco Use   Smoking Status Former Smoker    Packs/day: 1 00    Years: 38 00    Pack years: 38 00    Types: Cigarettes    Start date:     Quit date:     Years since quittin 3   Smokeless Tobacco Never Used       PhysicalExamination:  Vitals:   /68   Pulse 80   Temp 98 °F (36 7 °C)   Resp 18   Ht 5' 6" (1 676 m)   Wt 110 kg (243 lb)   SpO2 98%   BMI 39 22 kg/m²       General: alert, not in acute distress  HEENT: PERRL, no icteric sclera or cyanosis, no thrush  Neck:  Supple, no lymphadenopathy or thyromegaly, no JVD  Lungs:  Equal breath sounds and clear auscultations bilaterally, no wheezing or crackles  Heart: S1S2 regular, no murmures or gallops  Abdomen: soft, non-tender, bowel sounds  present  Extrimities: no edema, no clubbing or cyanosis  Neuro: Alert and oriented x 3, no focal neurodeficits   Skin: intact, no rashes    Diagnostic Data:  Labs: I personally reviewed the most recent laboratory data pertinent to today's visit    Lab Results   Component Value Date    WBC 9 35 09/12/2020    HGB 14 9 09/12/2020    HCT 43 7 09/12/2020    MCV 90 09/12/2020     09/12/2020     Lab Results   Component Value Date    SODIUM 139 09/12/2020    K 4 2 09/12/2020    CO2 25 09/12/2020     09/12/2020    BUN 15 09/12/2020    CREATININE 0 95 09/12/2020    CALCIUM 8 7 09/12/2020       PFT results: The most recent pulmonary function tests were reviewed  · Normal Spirometry     · No significant response to the administration to bronchodilator per ATS Standards     · Normal Lung volumes     · Normal diffusion     · Normal flow volume loops     Imaging:  I personally reviewed the images on the [facility] system pertinent to today's visit  Chest x-ray reviewed on PACs:  Clear lungs     Other studies:  Sleep split study  IMPRESSION:  Mr Zoraida Nageotte underwent a split night sleep study  He had an increased number of sleep related respiratory events  which is consistent with severe obstructive sleep apnea (AHI - 84)  During the titration portion of the study he underwent titration of CPAP up to 9 cc of H2O p ressure  However, in  the nonsupine and REM sleep it seemed to be adequate at 8 cc of H2O pressure  In addition, he was previously on  autoPAP 8-20 cc of H2O pressure  Therefore, I recommend a trial of auto-titrating CPAP 8-20 cc of H2O pressure  with heated humidification  Compliance should be evaluated in 1-2 months       CPAP  Compliance report:    Over the past 30 days patient used his device 100%, 93 3% more than 4 hours per night  Average usage was close to 8 hours per night  His mean CPAP pressure was 12 cm H2O, residual AHI 2 8, average leak per day was 20 minutes and 12 seconds          Polina Xie MD  Answers for HPI/ROS submitted by the patient on 5/13/2021   Primary symptoms  Do you have difficulty breathing?: Yes  Chronicity: recurrent  When did you first notice your symptoms?: more than 1 year ago  How often do your symptoms occur?: constantly  Since you first noticed this problem, how has it changed?: gradually worsening  Do you have shortness of breath that occurs with effort or exertion?: Yes  Do you have fatigue?: Yes  Do you have shortness of breath when lying flat?: Yes  Do you have shortness of breath when you wake up?: Yes  Which of the following makes your symptoms worse?: any activity, climbing stairs, eating, exercise, lying down, minimal activity, strenuous activity  Which of the following makes your symptoms better?: nothing

## 2021-05-13 NOTE — ASSESSMENT & PLAN NOTE
Encouraged patient to decrease calorie intake and exercise  To lose weight to help with shortness of breath

## 2021-05-17 ENCOUNTER — IMMUNIZATIONS (OUTPATIENT)
Dept: FAMILY MEDICINE CLINIC | Facility: HOSPITAL | Age: 56
End: 2021-05-17

## 2021-05-17 DIAGNOSIS — Z23 ENCOUNTER FOR IMMUNIZATION: Primary | ICD-10-CM

## 2021-05-17 PROCEDURE — 91301 SARS-COV-2 / COVID-19 MRNA VACCINE (MODERNA) 100 MCG: CPT

## 2021-05-17 PROCEDURE — 0011A SARS-COV-2 / COVID-19 MRNA VACCINE (MODERNA) 100 MCG: CPT

## 2021-05-20 ENCOUNTER — TELEPHONE (OUTPATIENT)
Dept: PULMONOLOGY | Facility: CLINIC | Age: 56
End: 2021-05-20

## 2021-05-20 ENCOUNTER — HOSPITAL ENCOUNTER (OUTPATIENT)
Dept: INFUSION CENTER | Facility: CLINIC | Age: 56
End: 2021-05-20

## 2021-05-20 DIAGNOSIS — J45.51 SEVERE PERSISTENT ASTHMA WITH ACUTE EXACERBATION: Primary | ICD-10-CM

## 2021-05-20 RX ORDER — FLUTICASONE PROPIONATE AND SALMETEROL XINAFOATE 230; 21 UG/1; UG/1
2 AEROSOL, METERED RESPIRATORY (INHALATION) 2 TIMES DAILY
Qty: 12 G | Refills: 6 | Status: SHIPPED | OUTPATIENT
Start: 2021-05-20 | End: 2022-06-10

## 2021-06-03 ENCOUNTER — HOSPITAL ENCOUNTER (OUTPATIENT)
Dept: INFUSION CENTER | Facility: CLINIC | Age: 56
Discharge: HOME | End: 2021-06-03
Payer: COMMERCIAL

## 2021-06-03 DIAGNOSIS — J45.51 SEVERE PERSISTENT ASTHMA WITH ACUTE EXACERBATION: Primary | ICD-10-CM

## 2021-06-03 PROCEDURE — 96372 THER/PROPH/DIAG INJ SC/IM: CPT

## 2021-06-03 RX ORDER — EPINEPHRINE 1 MG/ML
0.3 INJECTION, SOLUTION, CONCENTRATE INTRAVENOUS
Status: CANCELLED | OUTPATIENT
Start: 2021-06-16

## 2021-06-03 RX ADMIN — BENRALIZUMAB 30 MG: 30 INJECTION, SOLUTION SUBCUTANEOUS at 13:53

## 2021-06-03 NOTE — PROGRESS NOTES
Pt arrived to unit without complaint  Pt given Fasenra injection as ordered   Pt has epipen with him that expires Nov 2022   Pt observed for 30 min in the infusion center post injection   AVS declined, but pt aware of future appts  Pt left unit in stable condition

## 2021-06-19 ENCOUNTER — IMMUNIZATIONS (OUTPATIENT)
Dept: FAMILY MEDICINE CLINIC | Facility: HOSPITAL | Age: 56
End: 2021-06-19

## 2021-06-19 DIAGNOSIS — Z23 ENCOUNTER FOR IMMUNIZATION: Primary | ICD-10-CM

## 2021-06-19 PROCEDURE — 0012A SARS-COV-2 / COVID-19 MRNA VACCINE (MODERNA) 100 MCG: CPT

## 2021-06-19 PROCEDURE — 91301 SARS-COV-2 / COVID-19 MRNA VACCINE (MODERNA) 100 MCG: CPT

## 2021-06-24 NOTE — TELEPHONE ENCOUNTER
Meaghan Givens calling from [facility] stating the auth for fasenra   and he is requesting new auth be made   His callback is [phone]

## 2021-06-25 NOTE — TELEPHONE ENCOUNTER
Called and spoke with [facility], informed them I have auth on file until 6/11/22   She will forward the message to Gladys and they will call back if any further concerns

## 2021-07-01 ENCOUNTER — TELEPHONE (OUTPATIENT)
Dept: PULMONOLOGY | Facility: CLINIC | Age: 56
End: 2021-07-01

## 2021-07-01 NOTE — TELEPHONE ENCOUNTER
Patient calling asking for an update on the social security paperwork he gave to Brenden Poole   Please advise

## 2021-07-28 ENCOUNTER — HOSPITAL ENCOUNTER (OUTPATIENT)
Dept: NON INVASIVE DIAGNOSTICS | Facility: HOSPITAL | Age: 56
Discharge: HOME | End: 2021-07-28
Attending: INTERNAL MEDICINE
Payer: COMMERCIAL

## 2021-07-28 DIAGNOSIS — R06.02 SHORTNESS OF BREATH: ICD-10-CM

## 2021-07-28 PROCEDURE — 93306 TTE W/DOPPLER COMPLETE: CPT

## 2021-07-28 PROCEDURE — 93356 MYOCRD STRAIN IMG SPCKL TRCK: CPT

## 2021-07-28 PROCEDURE — 93306 TTE W/DOPPLER COMPLETE: CPT | Performed by: INTERNAL MEDICINE

## 2021-07-29 ENCOUNTER — HOSPITAL ENCOUNTER (OUTPATIENT)
Dept: INFUSION CENTER | Facility: CLINIC | Age: 56
Discharge: HOME | End: 2021-07-29
Payer: COMMERCIAL

## 2021-07-29 ENCOUNTER — TELEPHONE (OUTPATIENT)
Dept: PULMONOLOGY | Facility: CLINIC | Age: 56
End: 2021-07-29

## 2021-07-29 VITALS
HEART RATE: 68 BPM | SYSTOLIC BLOOD PRESSURE: 112 MMHG | RESPIRATION RATE: 16 BRPM | TEMPERATURE: 96.9 F | DIASTOLIC BLOOD PRESSURE: 76 MMHG

## 2021-07-29 DIAGNOSIS — J45.51 SEVERE PERSISTENT ASTHMA WITH ACUTE EXACERBATION: Primary | ICD-10-CM

## 2021-07-29 PROCEDURE — 96372 THER/PROPH/DIAG INJ SC/IM: CPT

## 2021-07-29 RX ORDER — EPINEPHRINE 1 MG/ML
0.3 INJECTION, SOLUTION, CONCENTRATE INTRAVENOUS
Status: CANCELLED | OUTPATIENT
Start: 2021-08-11

## 2021-07-29 RX ORDER — EPINEPHRINE 1 MG/ML
0.3 INJECTION, SOLUTION, CONCENTRATE INTRAVENOUS
Status: DISCONTINUED | OUTPATIENT
Start: 2021-07-29 | End: 2021-08-01 | Stop reason: HOSPADM

## 2021-07-29 RX ORDER — EPINEPHRINE 1 MG/ML
0.3 INJECTION, SOLUTION, CONCENTRATE INTRAVENOUS
Status: CANCELLED | OUTPATIENT
Start: 2021-09-23

## 2021-07-29 RX ADMIN — BENRALIZUMAB 30 MG: 30 INJECTION, SOLUTION SUBCUTANEOUS at 11:12

## 2021-07-29 NOTE — TELEPHONE ENCOUNTER
Inder Shaver,    Please let Paula Velásquez know that his echocardiogram was normal  Thank you      BIJAN Alberts

## 2021-07-29 NOTE — PROGRESS NOTES
Pt  Denied new symptoms or concerns today  Epi Pen present  Exp 11/2022  Fasenra 30mg given SQ in [place]  Pt  Tolerated well  Future appointment scheduled  AVS provided

## 2021-08-05 ENCOUNTER — OFFICE VISIT (OUTPATIENT)
Dept: DENTISTRY | Facility: CLINIC | Age: 56
End: 2021-08-05

## 2021-08-05 VITALS — DIASTOLIC BLOOD PRESSURE: 83 MMHG | TEMPERATURE: 95.9 F | HEART RATE: 73 BPM | SYSTOLIC BLOOD PRESSURE: 124 MMHG

## 2021-08-05 DIAGNOSIS — K02.9 CARIES: ICD-10-CM

## 2021-08-05 DIAGNOSIS — K08.50 DEFECTIVE DENTAL RESTORATION: Primary | ICD-10-CM

## 2021-08-05 NOTE — PROGRESS NOTES
Composite Filling    Sandra So presents for composite filling #7 MIFL  The previous restoration has fallen out  Clinically, the pt has an edge to edge bite and this is contributing to traumatic occlusal contacts on the teeth that may be dislodged the filling over time  PMH reviewed, no changes  The new provider re-evaluate the existing treatment plan  The pt does not care about the staining on #9 DF and the lesion appears hard - so this will not be done  #20 and 29 have active caries on the buccal cervical margin that need to be filled - consider glass ionomer  The pt does not have sensitivity  #7 MIFL composite   Applied topical benzocaine, administered 0 5 carps 4% articaine 1:100k epi via buccal and palatal infiltration  Prepped tooth #7 with 245 carbide on high speed  A large enamel bevel was added as well as a retention groove on the ML  Placed matrix and wedge  Isolation with cotton rolls and dri-angles  Etch with 37% H2PO4, rinse, dry  Applied Adhese with 20 second scrub once, gentle air dry and light cured for 10s  Restored with Tetric bulk russell and light cured  Refined with finishing burs, polished with enhance point and gloss  Verified occlusion and contacts  Floss was passed through the interproximal contact with a good snap  Pt left satisfied  Pt said upper partial is approved by insurance and he is excited to begin      NV: #20 B (V), #29 B (V) composites (consider glass ionomer)    NV2: final impress for upper partial     Provider should check if we have a custom tray for the upper final impress

## 2021-08-12 ENCOUNTER — OFFICE VISIT (OUTPATIENT)
Dept: DENTISTRY | Facility: CLINIC | Age: 56
End: 2021-08-12

## 2021-08-12 VITALS — TEMPERATURE: 97.7 F | SYSTOLIC BLOOD PRESSURE: 132 MMHG | HEART RATE: 73 BPM | DIASTOLIC BLOOD PRESSURE: 83 MMHG

## 2021-08-12 DIAGNOSIS — Z01.20 DENTAL EXAMINATION: Primary | ICD-10-CM

## 2021-08-12 PROCEDURE — D0220 INTRAORAL - PERIAPICAL FIRST RADIOGRAPHIC IMAGE: HCPCS | Performed by: DENTIST

## 2021-08-12 PROCEDURE — D9430 OFFICE VISIT FOR OBSERVATION (DURING REGULARLY SCHEDULED HOURS) - NO OTHER SERVICES PERFORMED: HCPCS | Performed by: DENTIST

## 2021-08-12 RX ORDER — TRIAMCINOLONE ACETONIDE 0.1 %
1 PASTE (GRAM) DENTAL 2 TIMES DAILY
Qty: 1 G | Refills: 0 | Status: SHIPPED | OUTPATIENT
Start: 2021-08-12

## 2021-08-12 NOTE — PROGRESS NOTES
Pt presents for 20 B and 28 B fillings  Pt cc today is after his filling on #7 last week, he is experiencing extreme sensitivity around the gums  Pt states that he noticed the pain immediately after the novacaine wore off  Pt cannot drink anything hot or cold due to the pain  I/O examination revealed a 2x5 mm ulcer and irritation on the lingual aspect of #7  Clinically, the lesion has a red tint and white area around the gums  Pa taken of 7 and reviewed  WNL    Explained to the patient that we cannot continue with dental work today as it could agitate the lesion further  Prescribed Kenalog (topical steroid cream) to help with the healing  Instructions given to use 3x daily and not to eat or drink anything for 30 minutes after  Pt understands and will return in about a week for a follow-up appointment  Pt is eager to get started on his partials  NV: Follow-Up  NVV: 20 B + 29 B and final impression for upper

## 2021-08-19 ENCOUNTER — OFFICE VISIT (OUTPATIENT)
Dept: DENTISTRY | Facility: CLINIC | Age: 56
End: 2021-08-19

## 2021-08-19 VITALS — SYSTOLIC BLOOD PRESSURE: 116 MMHG | TEMPERATURE: 96.8 F | HEART RATE: 70 BPM | DIASTOLIC BLOOD PRESSURE: 75 MMHG

## 2021-08-19 DIAGNOSIS — K13.70 ORAL MUCOSAL LESION: Primary | ICD-10-CM

## 2021-08-19 PROCEDURE — D0140 LIMITED ORAL EVALUATION - PROBLEM FOCUSED: HCPCS | Performed by: DENTIST

## 2021-08-19 NOTE — PROGRESS NOTES
55 yo M presents for 7 day f/u for evaluation of soft tissue lesion on hard palate posterior to #7  Pt states that his pain has decreased greatly in the last 3-4 days  He states he was unable to get the Kenalog (prescribed at last visit) due to delay in prior authorization  He states his pain is only aggravated from eating sharp foods (chips etc) but he is otherwise able to tolerate food and drink  E/O: No asymmetry, LAD, or TTP    I/O: Mildly ulcerated normally-healing lesion of the hard palate (lingual to #7) approximately 2x3 mm in size - site nonpurulent, non-bleeding, non-tender to palpation, no rolled pearly gingival border, gingival color WNL  Intraoral photos taken to establish new baseline and comparison for future evaluation  Pt advised to avoid sharp foods to prevent trauma to the site       NV: F/U in 4 weeks, #20 B composite

## 2021-08-24 ENCOUNTER — OFFICE VISIT (OUTPATIENT)
Dept: DENTISTRY | Facility: CLINIC | Age: 56
End: 2021-08-24

## 2021-08-24 VITALS — SYSTOLIC BLOOD PRESSURE: 127 MMHG | DIASTOLIC BLOOD PRESSURE: 79 MMHG | HEART RATE: 79 BPM | TEMPERATURE: 97.5 F

## 2021-08-24 DIAGNOSIS — K02.9 CARIES: Primary | ICD-10-CM

## 2021-08-24 PROCEDURE — D2392 RESIN-BASED COMPOSITE - 2 SURFACES, POSTERIOR: HCPCS | Performed by: DENTIST

## 2021-08-25 ENCOUNTER — OFFICE VISIT (OUTPATIENT)
Dept: DENTISTRY | Facility: CLINIC | Age: 56
End: 2021-08-25

## 2021-08-25 DIAGNOSIS — Z01.20 DENTAL EXAMINATION: Primary | ICD-10-CM

## 2021-08-25 PROCEDURE — WIS5001 FINAL IMPRESSIONS DENTURE: Performed by: DENTIST

## 2021-08-25 NOTE — PROGRESS NOTES
Pt presents for impression appointment  Pt is looking for an esthetic but durable partial  Pt is a good candidate for resin-based maxillary partial      Alginate impression taken with stock tray for lower cast  Upper PVS impression taken with stock tray for resin based partial denture with heavy body  Gingival shade  selected, Raven Classical A3 5 for denture teeth selected  Pt confirmed shade via mirror and is satisfied with the selected shades      NV: Delivery of Upper Partial Denture

## 2021-08-25 NOTE — PROGRESS NOTES
This note is for the appt that occurred on 8/24/21  Please note that the pt did also report for an appt with a different provider on 8/25/21  Pt reports for restorations #20 B(V) and #29 DB (V)  These were both done today - even though previous provider completed the code already  PMH reviewed w/ NSC  Pt does not report any pain today and noted that his palatal lesion from the previous two appts has healed fully with no sensitivity  The provider confirmed this before starting other treatment  Discussed with patient need for RCT if pulp exposure occurs or in future if pulp is inflamed  Pt understands and consents  #20 B(V) composite:  Applied topical benzocaine, administered 0 5 carps 4% articaine 1:100k epi via buccal infiltration  Prepped tooth #20 with high speed  Caries removed with round carbide on slow speed  Placed 0 retraction cord soaked in hemodent on buccal  Isolation with cotton rolls and dri-angles  Etch with 37% H2PO4, rinse, dry  GLUMA applied according to 's instructions, as prepped went deep towards pulp  Applied Adhese with 20 second scrub once, gentle air dry and light cured for 10s  Restored with glass ionomer and light cured  Refined with finishing burs, polished with enhance point  Verified margins and removed cord  #29 DB(V) composite:  Applied topical benzocaine, administered 0 5 carps lidocaine via buccal infiltration  Prepped tooth #29 with high speed  Caries removed with round carbide on slow speed  Placed 1 retraction cord soaked in hemodent on buccal  Isolation with cotton rolls and dri-angles  Etch with 37% H2PO4, rinse, dry  GLUMA applied according to 's instructions, as prepped went deep towards pulp  Applied Adhese with 20 second scrub once, gentle air dry and light cured for 10s  Restored with glass ionomer and light cured  Refined with finishing burs, polished with enhance point  Verified margins and removed cord      NV: final impressions for maxillary partial denture

## 2021-09-17 ENCOUNTER — OFFICE VISIT (OUTPATIENT)
Dept: DENTISTRY | Facility: CLINIC | Age: 56
End: 2021-09-17

## 2021-09-17 VITALS — TEMPERATURE: 96.8 F

## 2021-09-17 DIAGNOSIS — Z01.20 ENCOUNTER FOR DENTAL EXAMINATION: Primary | ICD-10-CM

## 2021-09-20 NOTE — PROGRESS NOTES
[facility] for eval of lesion on lingual #7 and delivery of maxillary resin-based RPD  No CC at this visit  Pain scale 0/10    Pt reports lesion is fully healed with no irritated tissue detectable  Check previous chart notes for more info on lesion on #7 lingual     Tried in maxillary RPD  Occlusion checked and adjusted as needed for equal centric contacts  Pt did not like a slight black triangle area between #10/11  Recommended adding acrylic to area and delivering it at NV  Pt agreed to adding acrylic to close black space  Dr Gleason Escort  Dr Maryanne Valente    NV: delivery of max   RPD

## 2021-09-21 ENCOUNTER — OFFICE VISIT (OUTPATIENT)
Dept: DENTISTRY | Facility: CLINIC | Age: 56
End: 2021-09-21

## 2021-09-21 ENCOUNTER — OFFICE VISIT (OUTPATIENT)
Dept: PULMONOLOGY | Facility: CLINIC | Age: 56
End: 2021-09-21
Payer: COMMERCIAL

## 2021-09-21 VITALS
WEIGHT: 227 LBS | HEART RATE: 73 BPM | HEIGHT: 67 IN | BODY MASS INDEX: 35.63 KG/M2 | TEMPERATURE: 97.1 F | OXYGEN SATURATION: 98 % | DIASTOLIC BLOOD PRESSURE: 68 MMHG | SYSTOLIC BLOOD PRESSURE: 118 MMHG

## 2021-09-21 DIAGNOSIS — R06.02 SHORTNESS OF BREATH: ICD-10-CM

## 2021-09-21 DIAGNOSIS — Z87.09 HISTORY OF PNEUMOTHORAX: ICD-10-CM

## 2021-09-21 DIAGNOSIS — G47.33 OSA ON CPAP: Chronic | ICD-10-CM

## 2021-09-21 DIAGNOSIS — E66.09 CLASS 2 OBESITY DUE TO EXCESS CALORIES WITH BODY MASS INDEX (BMI) OF 35.0 TO 35.9 IN ADULT, UNSPECIFIED WHETHER SERIOUS COMORBIDITY PRESENT: ICD-10-CM

## 2021-09-21 DIAGNOSIS — Z01.20 ENCOUNTER FOR DENTAL EXAMINATION: Primary | ICD-10-CM

## 2021-09-21 DIAGNOSIS — Z99.89 OSA ON CPAP: Chronic | ICD-10-CM

## 2021-09-21 DIAGNOSIS — Z12.2 ENCOUNTER FOR SCREENING FOR LUNG CANCER: ICD-10-CM

## 2021-09-21 DIAGNOSIS — J45.51 SEVERE PERSISTENT ASTHMA WITH ACUTE EXACERBATION: Primary | ICD-10-CM

## 2021-09-21 PROCEDURE — 94618 PULMONARY STRESS TESTING: CPT | Performed by: INTERNAL MEDICINE

## 2021-09-21 PROCEDURE — 99215 OFFICE O/P EST HI 40 MIN: CPT | Performed by: INTERNAL MEDICINE

## 2021-09-21 PROCEDURE — D5211 MAXILLARY PARTIAL DENTURE - RESIN BASE (INCLUDING, RETENTIVE/CLASPING MATERIALS, RESTS, AND TEETH): HCPCS | Performed by: DENTIST

## 2021-09-21 RX ORDER — ROSUVASTATIN CALCIUM 40 MG/1
40 TABLET, COATED ORAL DAILY
COMMUNITY
Start: 2021-09-09

## 2021-09-21 RX ORDER — DIAZEPAM 5 MG/1
TABLET ORAL
COMMUNITY
Start: 2021-08-02

## 2021-09-21 RX ORDER — POLYETHYLENE GLYCOL 3350 17 G/17G
POWDER, FOR SOLUTION ORAL
COMMUNITY
Start: 2021-08-11

## 2021-09-21 RX ORDER — IBUPROFEN AND FAMOTIDINE 800; 26.6 MG/1; MG/1
TABLET, COATED ORAL
COMMUNITY
Start: 2021-09-17

## 2021-09-21 RX ORDER — OXYCODONE HYDROCHLORIDE AND ACETAMINOPHEN 5; 325 MG/1; MG/1
1 TABLET ORAL EVERY 4 HOURS PRN
COMMUNITY
Start: 2021-08-18

## 2021-09-21 NOTE — PROGRESS NOTES
[facility]  Acrylic was added to mesial of #11 to close black triangle - tried in partial and pt liked esthetics  Occlusion checked - no adjustments made  Let pt know to call for any adjustments if needed  6-[facility] scheduled for November      Dr Issac Kirkland    NV: [facility]

## 2021-09-21 NOTE — PROGRESS NOTES
Progress note - Pulmonary Medicine   lAonso Lacy Lowe 64 y o  male MRN: [id number]       Impression & Plan:     Severe persistent asthma with acute exacerbation  For now will continue current medications and give Belita Jason few more months, if no improvement then will consider switching to another biologic    KATHLEEN on CPAP  Continue CPAP, compliant    Class 2 obesity in adult  Encouraged to decrease calorie intake and try to exercise as tolerated to lose weight    History of pneumothorax  Patient is aware of the symptoms in case of recurrence    Shortness of breath  Multifactorial including asthma but also obesity and deconditioning    Encounter for screening for lung cancer  Will order low-dose CT scan      Diagnoses and all orders for this visit:    Severe persistent asthma with acute exacerbation  -     POCT 6 minute walk    KATHLEEN on CPAP    Class 2 obesity due to excess calories with body mass index (BMI) of 35 0 to 35 9 in adult, unspecified whether serious comorbidity present    History of pneumothorax    Shortness of breath    Encounter for screening for lung cancer  -     CT lung screening program; Future    Other orders  -     diazepam (VALIUM) 5 mg tablet; take 1 to 2 tablets by mouth once daily if needed for muscle spas     (REFER TO PRESCRIPTION NOTES)  -     Duexis 800-26 6 MG TABS  -     oxyCODONE-acetaminophen (PERCOCET) 5-325 mg per tablet; take 1 to 2 tablets by mouth every 8 hours if needed for severe p   (REFER TO PRESCRIPTION NOTES)  -     polyethylene glycol (GLYCOLAX) 17 GM/SCOOP powder; TAKE 238 GRAM BY MOUTH AS DIRECTED  -     rosuvastatin (CRESTOR) 40 MG tablet; Take 40 mg by mouth daily      ______________________________________________________________________    HPI:    Danya Bumpers presents today for follow-up of severe persistent asthma currently maxed on medications with Symbicort, Spiriva and p r n  albuterol  Also has been getting Belita Jason for the past several months    He does not support significant improvement on Fasenra  He has persistent dyspnea on exertion and chest tightness sometimes with minimal wheezing, no significant cough and no frequent exacerbations of his asthma  He denies chest pain, denies fever or chills or night sweats, denies legs edema or orthopnea  He has obstructive sleep apnea and he is compliant with his CPAP every night but still sometimes feels shortness of breath At night and when wakes up  He has mild GERD with very infrequent heartburn symptoms  His main complaint is chronic lower back pain and he feels that it is limiting factor for his activity ability to walk long distance    He smoked 38 pack year history and quit in 2016  Current Medications:    Current Outpatient Medications:     albuterol (2 5 mg/3 mL) 0 083 % nebulizer solution, Take 1 vial (2 5 mg total) by nebulization every 6 (six) hours as needed for wheezing or shortness of breath, Disp: 360 mL, Rfl: 4    albuterol (PROVENTIL HFA,VENTOLIN HFA) 90 mcg/act inhaler, inhale 2 puffs by mouth every 6 hours if needed for wheezing, Disp: 18 g, Rfl: 3    albuterol (Ventolin HFA) 90 mcg/act inhaler, Inhale 2 puffs every 6 (six) hours as needed for wheezing, Disp: 18 g, Rfl: 5    benralizumab (FASENRA) subcutaneous injection, Inject 1 mL (30 mg total) under the skin every 56 days, Disp: 1 Syringe, Rfl: 5    cyclobenzaprine (FLEXERIL) 5 mg tablet, Take 5 mg by mouth 3 (three) times a day as needed for muscle spasms, Disp: , Rfl:     diazepam (VALIUM) 5 mg tablet, take 1 to 2 tablets by mouth once daily if needed for muscle spas     (REFER TO PRESCRIPTION NOTES)  , Disp: , Rfl:     docusate sodium (COLACE) 100 mg capsule, Take 1 capsule (100 mg total) by mouth 2 (two) times a day While taking narcotic pain medication, Disp: 10 capsule, Rfl: 0    Duexis 800-26 6 MG TABS, , Disp: , Rfl:     Fasenra subcutaneous injection, INJECT 1 SYRINGE UNDER THE SKIN AT WEEK 0, 4 AND 8, THEN INJECT 1 SYRINGE EVERY 8 WEEKS THEREAFTER , Disp: 1 Syringe, Rfl: 6    fluticasone-salmeterol (Advair HFA) 230-21 MCG/ACT inhaler, Inhale 2 puffs 2 (two) times a day Rinse mouth after use , Disp: 12 g, Rfl: 6    ibuprofen (MOTRIN) 200 mg tablet, Take 800 mg by mouth every 6 (six) hours as needed for mild pain , Disp: , Rfl:     levalbuterol (XOPENEX) 1 25 mg/0 5 mL nebulizer solution, Take 0 5 mL (1 25 mg total) by nebulization every 8 (eight) hours as needed for wheezing, Disp: 30 mL, Rfl: 0    oxyCODONE-acetaminophen (PERCOCET) 5-325 mg per tablet, take 1 to 2 tablets by mouth every 8 hours if needed for severe p   (REFER TO PRESCRIPTION NOTES)  , Disp: , Rfl:     polyethylene glycol (GLYCOLAX) 17 GM/SCOOP powder, TAKE 238 GRAM BY MOUTH AS DIRECTED, Disp: , Rfl:     rosuvastatin (CRESTOR) 40 MG tablet, Take 40 mg by mouth daily, Disp: , Rfl:     tiotropium (SPIRIVA RESPIMAT) 2 5 MCG/ACT AERS inhaler, Inhale 2 puffs daily, Disp: 4 g, Rfl: 0    traMADol (ULTRAM) 50 mg tablet, Take 50 mg by mouth every 6 (six) hours as needed for moderate pain Chronic back pain, Disp: , Rfl:     acetaminophen (TYLENOL) 325 mg tablet, Take 2 tablets (650 mg total) by mouth every 6 (six) hours as needed for mild pain (Patient not taking: Reported on 9/21/2021), Disp: 30 tablet, Rfl: 0    EPINEPHrine (EPIPEN) 0 3 mg/0 3 mL SOAJ, Inject 0 3 mL (0 3 mg total) into a muscle once for 1 dose, Disp: 0 6 mL, Rfl: 0    triamcinolone (KENALOG) 0 1 % oral topical paste, Apply 1 application topically 2 (two) times a day (Patient not taking: Reported on 9/21/2021), Disp: 1 g, Rfl: 0    Review of Systems:  Review of Systems   Constitutional: Positive for appetite change  HENT: Negative  Eyes: Negative  Respiratory: Positive for shortness of breath and wheezing  Cardiovascular: Negative  Gastrointestinal: Negative  Endocrine: Negative  Genitourinary: Negative  Musculoskeletal: Negative  Skin: Negative  Allergic/Immunologic: Negative  Neurological: Negative  Hematological: Negative  Psychiatric/Behavioral: Negative  Aside from what is mentioned in the HPI, the review of systems is otherwise negative    Past medical history, surgical history, and family history were reviewed and updated as appropriate    Social history updates:  Social History     Tobacco Use   Smoking Status Former Smoker    Packs/day: 1 00    Years: 38 00    Pack years: 38 00    Types: Cigarettes    Start date:     Quit date: 2016    Years since quittin 7   Smokeless Tobacco Never Used       PhysicalExamination:  Vitals:   /68 (BP Location: Left arm, Patient Position: Sitting, Cuff Size: Large)   Pulse 73   Temp (!) 97 1 °F (36 2 °C)   Ht 5' 7" (1 702 m)   Wt 103 kg (227 lb)   SpO2 98%   BMI 35 55 kg/m²     General: alert, not in acute distress  HEENT: PERRL, no icteric sclera or cyanosis, no thrush  Neck:  Supple, no lymphadenopathy or thyromegaly, no JVD  Lungs:  Equal breath sounds and clear auscultations bilaterally, no wheezing or crackles  Heart: S1S2 regular, no murmures or gallops  Abdomen: soft, non-tender, bowel sounds  present  Extrimities: no edema, no clubbing or cyanosis  Neuro: Alert and oriented x 3, no focal neurodeficits   Skin: intact, no rashes    Diagnostic Data:  Labs: I personally reviewed the most recent laboratory data pertinent to today's visit    Lab Results   Component Value Date    WBC 9 35 2020    HGB 14 9 2020    HCT 43 7 2020    MCV 90 2020     2020     Lab Results   Component Value Date    SODIUM 139 2020    K 4 2 2020    CO2 25 2020     2020    BUN 15 2020    CREATININE 0 95 2020    CALCIUM 8 7 2020       PFT results: The most recent pulmonary function tests were reviewed    Interpretation:     · Normal Spirometry     · No significant response to the administration to bronchodilator per ATS Standards     · Normal Lung volumes     · Normal diffusion     · Normal flow volume loops       Imaging:  I personally reviewed the images on the [facility] system pertinent to today's visit  Chest x-ray reviewed on PACs: Clear lungs    Other studies:  Echocardiogram:  LVEF 60%, normal RV, normal LA/RA, no evidence of pulmonary hypertension, no pericardial effusion    Split sleep study:  SUMMARY:                                                                   Pre-Treatment    Post-Treatment  Total Sleep Time (minutes)               35 0                        275 0  Apnea/Hypopnea Index (AHI)      84 0                        0 9  Central Apnea Index                           0 0                           0 0  Low oxygen saturation                      82 0%                    89 0%  PLMs index                                           0 0                            0 0  IMPRESSION:  Mr Concepcion Labs underwent a split night sleep study  He had an increased number of sleep related respiratory events  which is consistent with severe obstructive sleep apnea (AHI - 84)  During the titration portion of the study he underwent titration of CPAP up to 9 cc of H2O p ressure  However, in  the nonsupine and REM sleep it seemed to be adequate at 8 cc of H2O pressure  In addition, he was previously on  autoPAP 8-20 cc of H2O pressure  Therefore, I recommend a trial of auto-titrating CPAP 8-20 cc of H2O pressure  with heated humidification  Compliance should be evaluated in 1-2 months  Other studies:    6 minutes walk test done in our office today:  Raul Gonzalez started with pulse ox 97% on room air and heart rate 65 per minute  He was able to walk total 6 minutes/378 m without stopping and without desaturation  His pulse ox remained 97-98% on room air and heart rate increased to maximal 95 per minute  CPAP compliance report:  Patient used his CPAP in 89/90 days, 98 9% usage, and 6 7% usage more than 4 hours    Average usage was 8 hours and 30 minutes  Auto CPAP mean pressure was 10 5 cm H2O  Average leak time was 7 minutes per day  Average residual AHI 4 1              Dayton Mcnally MD  Answers for HPI/ROS submitted by the patient on 9/14/2021  Do you have chest tightness?: Yes  Do you have difficulty breathing?: Yes  Chronicity: chronic  When did you first notice your symptoms?: more than 1 year ago  How often do your symptoms occur?: daily  Since you first noticed this problem, how has it changed?: rapidly worsening  Do you have shortness of breath that occurs with effort or exertion?: Yes  Do you have fatigue?: Yes  Do you have shortness of breath when lying flat?: Yes  Do you have shortness of breath when you wake up?: Yes  Which of the following makes your symptoms worse?: any activity, climbing stairs, exercise, lying down, minimal activity  Which of the following makes your symptoms better?: nothing

## 2021-09-21 NOTE — ASSESSMENT & PLAN NOTE
For now will continue current medications and give Kade Brightly few more months, if no improvement then will consider switching to another biologic

## 2021-09-30 ENCOUNTER — HOSPITAL ENCOUNTER (OUTPATIENT)
Dept: INFUSION CENTER | Facility: CLINIC | Age: 56
Discharge: HOME | End: 2021-09-30
Payer: COMMERCIAL

## 2021-09-30 VITALS
DIASTOLIC BLOOD PRESSURE: 73 MMHG | RESPIRATION RATE: 18 BRPM | SYSTOLIC BLOOD PRESSURE: 117 MMHG | TEMPERATURE: 98.3 F | HEART RATE: 71 BPM

## 2021-09-30 DIAGNOSIS — J45.51 SEVERE PERSISTENT ASTHMA WITH ACUTE EXACERBATION: Primary | ICD-10-CM

## 2021-09-30 PROCEDURE — 96372 THER/PROPH/DIAG INJ SC/IM: CPT

## 2021-09-30 RX ORDER — EPINEPHRINE 1 MG/ML
0.3 INJECTION, SOLUTION, CONCENTRATE INTRAVENOUS
Status: CANCELLED | OUTPATIENT
Start: 2021-10-21

## 2021-09-30 RX ORDER — EPINEPHRINE 1 MG/ML
0.3 INJECTION, SOLUTION, CONCENTRATE INTRAVENOUS
Status: DISCONTINUED | OUTPATIENT
Start: 2021-09-30 | End: 2021-10-03 | Stop reason: HOSPADM

## 2021-09-30 RX ORDER — EPINEPHRINE 1 MG/ML
0.3 INJECTION, SOLUTION, CONCENTRATE INTRAVENOUS
Status: CANCELLED | OUTPATIENT
Start: 2021-11-30

## 2021-09-30 RX ADMIN — BENRALIZUMAB 30 MG: 30 INJECTION, SOLUTION SUBCUTANEOUS at 11:20

## 2021-10-06 ENCOUNTER — HOSPITAL ENCOUNTER (OUTPATIENT)
Dept: CT IMAGING | Facility: HOSPITAL | Age: 56
Discharge: HOME | End: 2021-10-06
Attending: INTERNAL MEDICINE
Payer: COMMERCIAL

## 2021-10-06 DIAGNOSIS — Z12.2 ENCOUNTER FOR SCREENING FOR LUNG CANCER: ICD-10-CM

## 2021-10-06 PROCEDURE — 71271 CT THORAX LUNG CANCER SCR C-: CPT

## 2021-11-10 ENCOUNTER — OFFICE VISIT (OUTPATIENT)
Dept: DENTISTRY | Facility: CLINIC | Age: 56
End: 2021-11-10

## 2021-11-10 VITALS — DIASTOLIC BLOOD PRESSURE: 78 MMHG | TEMPERATURE: 97.3 F | SYSTOLIC BLOOD PRESSURE: 117 MMHG | HEART RATE: 74 BPM

## 2021-11-10 DIAGNOSIS — Z01.20 ENCOUNTER FOR DENTAL EXAMINATION: Primary | ICD-10-CM

## 2021-11-10 PROCEDURE — D1110 PROPHYLAXIS - ADULT: HCPCS | Performed by: DENTAL HYGIENIST

## 2021-11-10 PROCEDURE — D0120 PERIODIC ORAL EVALUATION - ESTABLISHED PATIENT: HCPCS | Performed by: DENTIST

## 2021-11-10 PROCEDURE — D1330 ORAL HYGIENE INSTRUCTIONS: HCPCS | Performed by: DENTAL HYGIENIST

## 2021-11-18 ENCOUNTER — HOSPITAL ENCOUNTER (OUTPATIENT)
Dept: INFUSION CENTER | Facility: CLINIC | Age: 56
End: 2021-11-18

## 2021-11-30 ENCOUNTER — HOSPITAL ENCOUNTER (EMERGENCY)
Facility: HOSPITAL | Age: 56
Discharge: HOME | End: 2021-11-30
Attending: EMERGENCY MEDICINE
Payer: COMMERCIAL

## 2021-11-30 ENCOUNTER — APPOINTMENT (EMERGENCY)
Dept: RADIOLOGY | Facility: HOSPITAL | Age: 56
End: 2021-11-30
Payer: COMMERCIAL

## 2021-11-30 VITALS
HEART RATE: 69 BPM | OXYGEN SATURATION: 100 % | TEMPERATURE: 98.8 F | WEIGHT: 226 LBS | RESPIRATION RATE: 14 BRPM | HEIGHT: 66 IN | SYSTOLIC BLOOD PRESSURE: 144 MMHG | BODY MASS INDEX: 36.32 KG/M2 | DIASTOLIC BLOOD PRESSURE: 73 MMHG

## 2021-11-30 DIAGNOSIS — S09.90XA CLOSED HEAD INJURY, INITIAL ENCOUNTER: Primary | ICD-10-CM

## 2021-11-30 DIAGNOSIS — M79.644 PAIN OF RIGHT THUMB: ICD-10-CM

## 2021-11-30 PROCEDURE — G1004 CDSM NDSC: HCPCS

## 2021-11-30 PROCEDURE — 70450 CT HEAD/BRAIN W/O DYE: CPT

## 2021-11-30 PROCEDURE — 29130 APPL FINGER SPLINT STATIC: CPT | Performed by: EMERGENCY MEDICINE

## 2021-11-30 PROCEDURE — 73130 X-RAY EXAM OF HAND: CPT

## 2021-11-30 PROCEDURE — 99284 EMERGENCY DEPT VISIT MOD MDM: CPT

## 2021-11-30 PROCEDURE — 99284 EMERGENCY DEPT VISIT MOD MDM: CPT | Performed by: EMERGENCY MEDICINE

## 2021-12-14 DIAGNOSIS — J45.31 MILD PERSISTENT ASTHMA WITH ACUTE EXACERBATION: ICD-10-CM

## 2021-12-15 RX ORDER — ALBUTEROL SULFATE 90 UG/1
AEROSOL, METERED RESPIRATORY (INHALATION)
Qty: 18 G | Refills: 3 | Status: SHIPPED | OUTPATIENT
Start: 2021-12-15

## 2022-01-13 ENCOUNTER — HOSPITAL ENCOUNTER (OUTPATIENT)
Dept: INFUSION CENTER | Facility: CLINIC | Age: 57
Discharge: HOME | End: 2022-01-13
Payer: COMMERCIAL

## 2022-01-13 VITALS
TEMPERATURE: 99.2 F | HEART RATE: 63 BPM | DIASTOLIC BLOOD PRESSURE: 73 MMHG | RESPIRATION RATE: 18 BRPM | SYSTOLIC BLOOD PRESSURE: 106 MMHG

## 2022-01-13 DIAGNOSIS — J45.51 SEVERE PERSISTENT ASTHMA WITH ACUTE EXACERBATION: Primary | ICD-10-CM

## 2022-01-13 PROCEDURE — 96372 THER/PROPH/DIAG INJ SC/IM: CPT

## 2022-01-13 RX ORDER — EPINEPHRINE 1 MG/ML
0.3 INJECTION, SOLUTION, CONCENTRATE INTRAVENOUS
Status: DISCONTINUED | OUTPATIENT
Start: 2022-01-13 | End: 2022-01-16 | Stop reason: HOSPADM

## 2022-01-13 RX ORDER — EPINEPHRINE 1 MG/ML
0.3 INJECTION, SOLUTION, CONCENTRATE INTRAVENOUS
Status: CANCELLED | OUTPATIENT
Start: 2022-01-20

## 2022-01-13 RX ADMIN — BENRALIZUMAB 30 MG: 30 INJECTION, SOLUTION SUBCUTANEOUS at 11:26

## 2022-01-13 NOTE — PROGRESS NOTES
Pt given Palak Perkins as ordered  Pt will be observed for 30 min post injection per order  Pt declined AVS but did make future appt before leaving infusion center

## 2022-02-09 ENCOUNTER — DOCUMENTATION (OUTPATIENT)
Dept: PULMONOLOGY | Facility: CLINIC | Age: 57
End: 2022-02-09

## 2022-03-02 ENCOUNTER — TELEPHONE (OUTPATIENT)
Dept: PULMONOLOGY | Facility: CLINIC | Age: 57
End: 2022-03-02

## 2022-03-02 DIAGNOSIS — Z99.89 OSA ON CPAP: Primary | ICD-10-CM

## 2022-03-02 DIAGNOSIS — G47.33 OSA ON CPAP: Primary | ICD-10-CM

## 2022-03-02 NOTE — TELEPHONE ENCOUNTER
Pt called stating that [facility] needs us to send a new script to them for his CPAP/supplies  Please advise: [phone]

## 2022-03-09 ENCOUNTER — OFFICE VISIT (OUTPATIENT)
Dept: DENTISTRY | Facility: CLINIC | Age: 57
End: 2022-03-09

## 2022-03-09 VITALS — SYSTOLIC BLOOD PRESSURE: 128 MMHG | HEART RATE: 69 BPM | DIASTOLIC BLOOD PRESSURE: 78 MMHG | TEMPERATURE: 97.1 F

## 2022-03-09 DIAGNOSIS — K06.2 DENTURE IRRITATION: Primary | ICD-10-CM

## 2022-03-09 PROCEDURE — WIS5009 POST-OP DENTURE ADJUSTMENT: Performed by: DENTIST

## 2022-03-09 NOTE — PROGRESS NOTES
Pt presented to dental clinic for restorative and denture adjustment  Pt declined restorative as he has other appointments and would like to focus on denture adjustment  Pt wears maxillary resin based partial and can not eat with it  PIP used and adjusted intaglio and areas  Adjusted flanges  Adjusted clasp  Pt was very pleased with the results       NV- chery #[id number]

## 2022-03-14 ENCOUNTER — DOCUMENTATION (OUTPATIENT)
Dept: PULMONOLOGY | Facility: CLINIC | Age: 57
End: 2022-03-14

## 2022-03-14 ENCOUNTER — OFFICE VISIT (OUTPATIENT)
Dept: PULMONOLOGY | Facility: CLINIC | Age: 57
End: 2022-03-14
Payer: COMMERCIAL

## 2022-03-14 VITALS
SYSTOLIC BLOOD PRESSURE: 122 MMHG | OXYGEN SATURATION: 98 % | TEMPERATURE: 97.7 F | DIASTOLIC BLOOD PRESSURE: 80 MMHG | BODY MASS INDEX: 36.29 KG/M2 | WEIGHT: 225.8 LBS | HEART RATE: 78 BPM | HEIGHT: 66 IN

## 2022-03-14 DIAGNOSIS — Z87.891 FORMER SMOKER: ICD-10-CM

## 2022-03-14 DIAGNOSIS — G47.33 OSA ON CPAP: Chronic | ICD-10-CM

## 2022-03-14 DIAGNOSIS — E66.09 CLASS 2 OBESITY DUE TO EXCESS CALORIES WITH BODY MASS INDEX (BMI) OF 35.0 TO 35.9 IN ADULT, UNSPECIFIED WHETHER SERIOUS COMORBIDITY PRESENT: ICD-10-CM

## 2022-03-14 DIAGNOSIS — Z99.89 OSA ON CPAP: Chronic | ICD-10-CM

## 2022-03-14 DIAGNOSIS — J30.9 ALLERGIC RHINITIS, UNSPECIFIED SEASONALITY, UNSPECIFIED TRIGGER: ICD-10-CM

## 2022-03-14 DIAGNOSIS — Z12.2 ENCOUNTER FOR SCREENING FOR LUNG CANCER: ICD-10-CM

## 2022-03-14 DIAGNOSIS — J45.51 SEVERE PERSISTENT ASTHMA WITH ACUTE EXACERBATION: Primary | ICD-10-CM

## 2022-03-14 PROCEDURE — 99214 OFFICE O/P EST MOD 30 MIN: CPT | Performed by: INTERNAL MEDICINE

## 2022-03-14 RX ORDER — MONTELUKAST SODIUM 10 MG/1
10 TABLET ORAL
Qty: 30 TABLET | Refills: 6 | Status: SHIPPED | OUTPATIENT
Start: 2022-03-14

## 2022-03-14 NOTE — ASSESSMENT & PLAN NOTE
Although he has been using p r n  albuterol 1-3 times daily but this is much better than before and he wants to continue on Fasenra  Will continue other inhalers including Advair, Spiriva and p r n  albuterol    In the future if he gets worse I may try other biologic agent such as Dupixent or Tezspire

## 2022-03-14 NOTE — PROGRESS NOTES
Progress note - Pulmonary Medicine   Son Lowe 64 y o  male MRN: [id number]       Impression & Plan:     Severe persistent asthma with acute exacerbation  Although he has been using p r n  albuterol 1-3 times daily but this is much better than before and he wants to continue on [place]  Will continue other inhalers including Advair, Spiriva and p r n  albuterol  In the future if he gets worse I may try other biologic agent such as Dupixent or Tezspire    Allergic rhinitis  Will start patient on Singulair 10 mg daily to use at least during the spring season with his allergies and he can use over-the-counter antihistamine such as Zyrtec or Claritin or Allegra    KATHLEEN on CPAP  I spoke to patient and he prefers to try nasal prongs, he reports that during sleep study he was opening his mouth and this is why he was given facial mask  I will order mask fitting with nasal prongs and to use chin strap  Encounter for screening for lung cancer  Will order lung cancer screening CT scan for October 2022 and follow-up after that    Class 2 obesity in adult  He has been walking more frequent than before and he reports losing about 15 lb recently  Encouraged him to continue  Diagnoses and all orders for this visit:    Severe persistent asthma with acute exacerbation    KATHLEEN on CPAP  -     Mask fitting only; Future    Encounter for screening for lung cancer  -     CT lung screening program; Future    Allergic rhinitis, unspecified seasonality, unspecified trigger  -     montelukast (SINGULAIR) 10 mg tablet;  Take 1 tablet (10 mg total) by mouth daily at bedtime    Former smoker  -     CT lung screening program; Future    Class 2 obesity due to excess calories with body mass index (BMI) of 35 0 to 35 9 in adult, unspecified whether serious comorbidity present      ______________________________________________________________________    HPI:    Antonia Rascon presents today for follow-up of severe persistent asthma and sleep apnea  Patient continues to have exertional dyspnea and wheezing sometimes uses p r n  albuterol 1-3 times daily  He denies any exacerbation of his asthma over the past several months  Still he feels much better than before since we started the [place], as in the past used to use his albuterol more frequently  He is currently on Advair and Spiriva  He denies chest pain or fever or chills or night sweats  He denies postnasal drip  He has allergic rhinitis and expecting that to increase during the spring with sneezing and nasal congestion  He takes usually over-the-counter antihistamine  Patient has obstructive sleep apnea, he has been complaining of dryness and issues with facial mask and he wakes up multiple times at night    Patient is a former smoker with 38 pack year history and quit in 2016    Current Medications:    Current Outpatient Medications:     acetaminophen (TYLENOL) 325 mg tablet, Take 2 tablets (650 mg total) by mouth every 6 (six) hours as needed for mild pain, Disp: 30 tablet, Rfl: 0    albuterol (2 5 mg/3 mL) 0 083 % nebulizer solution, Take 1 vial (2 5 mg total) by nebulization every 6 (six) hours as needed for wheezing or shortness of breath, Disp: 360 mL, Rfl: 4    albuterol (PROVENTIL HFA,VENTOLIN HFA) 90 mcg/act inhaler, inhale 2 puffs by mouth every 6 hours if needed for wheezing, Disp: 18 g, Rfl: 3    benralizumab (FASENRA) subcutaneous injection, Inject 1 mL (30 mg total) under the skin every 56 days, Disp: 1 Syringe, Rfl: 5    Duexis 800-26 6 MG TABS, , Disp: , Rfl:     EPINEPHrine (EPIPEN) 0 3 mg/0 3 mL SOAJ, Inject 0 3 mL (0 3 mg total) into a muscle once for 1 dose, Disp: 0 6 mL, Rfl: 0    fluticasone-salmeterol (Advair HFA) 230-21 MCG/ACT inhaler, Inhale 2 puffs 2 (two) times a day Rinse mouth after use , Disp: 12 g, Rfl: 6    levalbuterol (XOPENEX) 1 25 mg/0 5 mL nebulizer solution, Take 0 5 mL (1 25 mg total) by nebulization every 8 (eight) hours as needed for wheezing, Disp: 30 mL, Rfl: 0    rosuvastatin (CRESTOR) 40 MG tablet, Take 40 mg by mouth daily, Disp: , Rfl:     tiotropium (SPIRIVA RESPIMAT) 2 5 MCG/ACT AERS inhaler, Inhale 2 puffs daily, Disp: 4 g, Rfl: 0    traMADol (ULTRAM) 50 mg tablet, Take 50 mg by mouth every 6 (six) hours as needed for moderate pain Chronic back pain, Disp: , Rfl:     albuterol (Ventolin HFA) 90 mcg/act inhaler, Inhale 2 puffs every 6 (six) hours as needed for wheezing (Patient not taking: Reported on 3/14/2022 ), Disp: 18 g, Rfl: 5    cyclobenzaprine (FLEXERIL) 5 mg tablet, Take 5 mg by mouth 3 (three) times a day as needed for muscle spasms (Patient not taking: Reported on 3/14/2022 ), Disp: , Rfl:     diazepam (VALIUM) 5 mg tablet, take 1 to 2 tablets by mouth once daily if needed for muscle spas     (REFER TO PRESCRIPTION NOTES)  (Patient not taking: Reported on 11/10/2021), Disp: , Rfl:     docusate sodium (COLACE) 100 mg capsule, Take 1 capsule (100 mg total) by mouth 2 (two) times a day While taking narcotic pain medication (Patient not taking: Reported on 11/10/2021 ), Disp: 10 capsule, Rfl: 0    Fasenra subcutaneous injection, INJECT 1 SYRINGE UNDER THE SKIN AT WEEK 0, 4 AND 8, THEN INJECT 1 SYRINGE EVERY 8 WEEKS THEREAFTER  (Patient not taking: Reported on 3/14/2022), Disp: 1 Syringe, Rfl: 6    ibuprofen (MOTRIN) 200 mg tablet, Take 800 mg by mouth every 6 (six) hours as needed for mild pain  (Patient not taking: Reported on 11/10/2021 ), Disp: , Rfl:     oxyCODONE-acetaminophen (PERCOCET) 5-325 mg per tablet, take 1 to 2 tablets by mouth every 8 hours if needed for severe p   (REFER TO PRESCRIPTION NOTES)   (Patient not taking: Reported on 3/14/2022), Disp: , Rfl:     polyethylene glycol (GLYCOLAX) 17 GM/SCOOP powder, TAKE 238 GRAM BY MOUTH AS DIRECTED (Patient not taking: Reported on 11/10/2021), Disp: , Rfl:     triamcinolone (KENALOG) 0 1 % oral topical paste, Apply 1 application topically 2 (two) times a day (Patient not taking: Reported on 2021), Disp: 1 g, Rfl: 0    Review of Systems:  Review of Systems   Constitutional: Negative  Eyes: Negative  Respiratory: Positive for shortness of breath and stridor  Cardiovascular: Negative  Gastrointestinal: Negative  Endocrine: Negative  Genitourinary: Negative  Musculoskeletal: Negative  Skin: Negative  Allergic/Immunologic: Negative  Neurological: Negative  Hematological: Negative  Psychiatric/Behavioral: Negative  Aside from what is mentioned in the HPI, the review of systems is otherwise negative    Past medical history, surgical history, and family history were reviewed and updated as appropriate    Social history updates:  Social History     Tobacco Use   Smoking Status Former Smoker    Packs/day: 1 00    Years: 38 00    Pack years: 38 00    Types: Cigarettes    Start date:     Quit date:     Years since quittin 2   Smokeless Tobacco Never Used       PhysicalExamination:  Vitals:   /80 (BP Location: Right arm, Patient Position: Sitting)   Pulse 78   Temp 97 7 °F (36 5 °C)   Ht 5' 6" (1 676 m)   Wt 102 kg (225 lb 12 8 oz)   SpO2 98%   BMI 36 45 kg/m²     General: alert, not in acute distress  HEENT: PERRL, no icteric sclera or cyanosis, no thrush  Neck:  Supple, no lymphadenopathy or thyromegaly, no JVD  Lungs:  Equal breath sounds with scattered bilateral inspiratory and expiratory wheezes, no crackles  Heart: S1S2 regular, no murmures or gallops  Abdomen: soft, non-tender, bowel sounds  present  Extrimities: no edema, no clubbing or cyanosis  Neuro: Alert and oriented x 3, no focal neurodeficits   Skin: intact, no rashes    Diagnostic Data:  Labs:   I personally reviewed the most recent laboratory data pertinent to today's visit    Lab Results   Component Value Date    WBC 9 35 2020    HGB 14 9 2020    HCT 43 7 2020    MCV 90 2020     2020     Lab Results Component Value Date    SODIUM 139 09/12/2020    K 4 2 09/12/2020    CO2 25 09/12/2020     09/12/2020    BUN 15 09/12/2020    CREATININE 0 95 09/12/2020    CALCIUM 8 7 09/12/2020       PFT results: The most recent pulmonary function tests were reviewed  Interpretation:     · Normal Spirometry     · No significant response to the administration to bronchodilator per ATS Standards     · Normal Lung volumes     · Normal diffusion     · Normal flow volume loops        Imaging:  I personally reviewed the images on the [facility] system pertinent to today's visit  Chest x-ray reviewed on PACs: Clear lungs     Other studies:  Echocardiogram:  LVEF 60%, normal RV, normal LA/RA, no evidence of pulmonary hypertension, no pericardial effusion     Split sleep study:  SUMMARY:                                                                   Pre-Treatment    Post-Treatment  Total Sleep Time (minutes)               35 0                        275 0  Apnea/Hypopnea Index (AHI)          84 0                        0 9  Central Apnea Index                           0 0                           0 0  Low oxygen saturation                      82 0%                    89 0%  PLMs index                                           0 0                            0 0  IMPRESSION:  Mr Sue Fuentes underwent a split night sleep study  He had an increased number of sleep related respiratory events  which is consistent with severe obstructive sleep apnea (AHI - 84)  During the titration portion of the study he underwent titration of CPAP up to 9 cc of H2O p ressure  However, in  the nonsupine and REM sleep it seemed to be adequate at 8 cc of H2O pressure  In addition, he was previously on  autoPAP 8-20 cc of H2O pressure  Therefore, I recommend a trial of auto-titrating CPAP 8-20 cc of H2O pressure  with heated humidification   Compliance should be evaluated in 1-2 months         Other studies:    6 minutes walk test done in our office last visit:  Merle Lundborg started with pulse ox 97% on room air and heart rate 65 per minute  He was able to walk total 6 minutes/378 m without stopping and without desaturation  His pulse ox remained 97-98% on room air and heart rate increased to maximal 95 per minute      CPAP compliance report last visit:  Patient used his CPAP in 89/90 days, 98 9% usage, and 6 7% usage more than 4 hours  Average usage was 8 hours and 30 minutes  Auto CPAP mean pressure was 10 5 cm H2O  Average leak time was 7 minutes per day    Average residual AHI 4 1         Efrain Oquendo MD

## 2022-03-14 NOTE — ASSESSMENT & PLAN NOTE
I spoke to patient and he prefers to try nasal prongs, he reports that during sleep study he was opening his mouth and this is why he was given facial mask  I will order mask fitting with nasal prongs and to use chin strap

## 2022-03-14 NOTE — ASSESSMENT & PLAN NOTE
Will start patient on Singulair 10 mg daily to use at least during the spring season with his allergies and he can use over-the-counter antihistamine such as Zyrtec or Claritin or Allegra

## 2022-03-14 NOTE — ASSESSMENT & PLAN NOTE
He has been walking more frequent than before and he reports losing about 15 lb recently  Encouraged him to continue

## 2022-03-21 NOTE — PROGRESS NOTES
Patient calling stating he has not heard from us about his mask fitting and wants to know if we can look into it   Please advise

## 2022-03-21 NOTE — PROGRESS NOTES
Order faxed to [facility] today as the [place] order did not go through  [facility] will contact pt

## 2022-04-06 ENCOUNTER — OFFICE VISIT (OUTPATIENT)
Dept: DENTISTRY | Facility: CLINIC | Age: 57
End: 2022-04-06

## 2022-04-06 VITALS — HEART RATE: 72 BPM | DIASTOLIC BLOOD PRESSURE: 71 MMHG | TEMPERATURE: 97 F | SYSTOLIC BLOOD PRESSURE: 121 MMHG

## 2022-04-06 DIAGNOSIS — K02.9 CARIES: Primary | ICD-10-CM

## 2022-04-06 PROCEDURE — WIS5009 POST-OP DENTURE ADJUSTMENT: Performed by: DENTIST

## 2022-04-06 NOTE — PROGRESS NOTES
PPTC for RPD adjustments  Pt feels that right half of RPD becomes loose when he occludes on left side  Pt also feels like he can't bite into many foods the same as before  Adjusted occlusals of a few denture teeth to achieve balanced occlusion on all denture teeth  Pt has end-end occlusion, with cusp tips occluding in posteriors, which prevents ideal centric occlusion  Last 2mm of 88 Alivia Gabriele adjusted on #2,6 to adapt wire to undercuts for more engagement  Overall the RPD has great fit and retention, no rocking or gaps noted  Pt satisfied with overall adjustments  Discussed limitations of RPDs and possibly forming new habits for functional chewing to prevent dislodging of denture  Pt understood      NV: Shade Guillaume

## 2022-04-08 DIAGNOSIS — Z99.89 OSA ON CPAP: Primary | ICD-10-CM

## 2022-04-08 DIAGNOSIS — G47.33 OSA ON CPAP: Primary | ICD-10-CM

## 2022-04-18 NOTE — PROGRESS NOTES
[facility]awanna [facility] called because they received a pressure change for 15cm, patient's optimal pressure is 12 - 13cm  Tawanna Mejia would like your approval to have an auto cpap 8-15cm  Please advise             Berry Marquis [phone]

## 2022-04-20 ENCOUNTER — TELEPHONE (OUTPATIENT)
Dept: PULMONOLOGY | Facility: CLINIC | Age: 57
End: 2022-04-20

## 2022-04-20 NOTE — TELEPHONE ENCOUNTER
Niles Vasquez, from [address], called re: they received the pressure change for [facility] CPAP but it only says "Please increase pressure to 15cm " Niles Vasquez is calling to get clarification b/c Grace Huizar is on an auto pap and they would like to know if  Dr Jacquelin Snellen would like the pressure changed to 8-15 or 15-20    Please advise Mohini Mack): [phone]

## 2022-04-25 DIAGNOSIS — G47.33 OSA ON CPAP: Primary | Chronic | ICD-10-CM

## 2022-04-25 DIAGNOSIS — Z99.89 OSA ON CPAP: Primary | Chronic | ICD-10-CM

## 2022-05-03 ENCOUNTER — TELEPHONE (OUTPATIENT)
Dept: PULMONOLOGY | Facility: CLINIC | Age: 57
End: 2022-05-03

## 2022-05-05 NOTE — TELEPHONE ENCOUNTER
Re called from [facility] and said message was left to the wrong person regarding the clarification   Please call [phone] ext [phone] and ask for Re

## 2022-05-06 NOTE — TELEPHONE ENCOUNTER
Please send the most recent order that was completed by Dr Pallavi Whitlock this has the appropriate pressure change    Arianna Montez did not receive

## 2022-05-17 ENCOUNTER — HOSPITAL ENCOUNTER (OUTPATIENT)
Dept: INFUSION CENTER | Facility: CLINIC | Age: 57
Discharge: HOME | End: 2022-05-17
Payer: COMMERCIAL

## 2022-05-17 VITALS
SYSTOLIC BLOOD PRESSURE: 140 MMHG | TEMPERATURE: 97.7 F | DIASTOLIC BLOOD PRESSURE: 82 MMHG | OXYGEN SATURATION: 99 % | RESPIRATION RATE: 18 BRPM | HEART RATE: 57 BPM

## 2022-05-17 DIAGNOSIS — J45.51 SEVERE PERSISTENT ASTHMA WITH ACUTE EXACERBATION: Primary | ICD-10-CM

## 2022-05-17 PROCEDURE — 96372 THER/PROPH/DIAG INJ SC/IM: CPT

## 2022-05-17 RX ORDER — EPINEPHRINE 1 MG/ML
0.3 INJECTION, SOLUTION, CONCENTRATE INTRAVENOUS
Status: DISCONTINUED | OUTPATIENT
Start: 2022-05-17 | End: 2022-05-20 | Stop reason: HOSPADM

## 2022-05-17 RX ORDER — EPINEPHRINE 1 MG/ML
0.3 INJECTION, SOLUTION, CONCENTRATE INTRAVENOUS
OUTPATIENT
Start: 2022-07-12

## 2022-05-17 RX ADMIN — BENRALIZUMAB 30 MG: 30 INJECTION, SOLUTION SUBCUTANEOUS at 08:43

## 2022-05-17 NOTE — PLAN OF CARE
Problem: Potential for Falls  Goal: Patient will remain free of falls  Description: INTERVENTIONS:  - Assess patient frequently for physical needs  -  Identify cognitive and physical deficits and behaviors that affect risk of falls    -  [facility] fall precautions as indicated by assessment   - Educate patient/family on patient safety including physical limitations  - Instruct patient to call for assistance with activity based on assessment  - Modify environment to reduce risk of injury  - Consider OT/PT consult to assist with strengthening/mobility  Outcome: Progressing

## 2022-05-20 ENCOUNTER — OFFICE VISIT (OUTPATIENT)
Dept: DENTISTRY | Facility: CLINIC | Age: 57
End: 2022-05-20

## 2022-05-20 VITALS — TEMPERATURE: 99.4 F | SYSTOLIC BLOOD PRESSURE: 126 MMHG | DIASTOLIC BLOOD PRESSURE: 85 MMHG | HEART RATE: 64 BPM

## 2022-05-20 DIAGNOSIS — K02.9 DENTAL CARIES: ICD-10-CM

## 2022-05-20 DIAGNOSIS — Z01.20 ENCOUNTER FOR DENTAL EXAMINATION: Primary | ICD-10-CM

## 2022-05-20 DIAGNOSIS — K03.6 ACCRETIONS ON TEETH: ICD-10-CM

## 2022-05-20 PROBLEM — J45.20 MILD INTERMITTENT ASTHMA WITHOUT COMPLICATION: Status: ACTIVE | Noted: 2020-09-25

## 2022-05-20 PROBLEM — E78.2 MIXED HYPERLIPIDEMIA: Status: ACTIVE | Noted: 2020-10-07

## 2022-05-20 PROBLEM — R07.89 OTHER CHEST PAIN: Status: ACTIVE | Noted: 2020-10-07

## 2022-05-20 PROBLEM — M47.816 SPONDYLOSIS OF LUMBAR SPINE: Status: ACTIVE | Noted: 2021-01-29

## 2022-05-20 PROCEDURE — D0220 INTRAORAL - PERIAPICAL FIRST RADIOGRAPHIC IMAGE: HCPCS

## 2022-05-20 PROCEDURE — D1110 PROPHYLAXIS - ADULT: HCPCS

## 2022-05-20 PROCEDURE — D0120 PERIODIC ORAL EVALUATION - ESTABLISHED PATIENT: HCPCS | Performed by: DENTIST

## 2022-05-20 PROCEDURE — D0274 BITEWINGS - 4 RADIOGRAPHIC IMAGES: HCPCS

## 2022-05-20 PROCEDURE — D1330 ORAL HYGIENE INSTRUCTIONS: HCPCS

## 2022-05-20 NOTE — PROGRESS NOTES
RECALL EXAM, ADULT PROPHY,  4 BWX , 1 pax # 25    REVIEWED MED HX: meds, allergies, health changes reviewed in Epic  Note patient receives infusions for asthma treatment  CHIEF CONCERN:  Pain # 18  Sens  To cold and pressure/ tapping  PAIN SCALE:  8/10  ASA CLASS:  2-3  PLAQUE:  mild -moderateaccumulation   CALCULUS:   light calculus   BLEEDING:   none l  STAIN :  light   ORAL HYGIENE:  Good- fair,   PERIO:  Spot probed only today  Needs to be evaluated/ updated  Most areas 3 mm  4 mm noted # 18   Gen, recession  Hand scaled, polished and flossed  Oral Hygiene Instruction:  recommended brushing 2 x daily for 2 minutes MIN, recommended flossing daily, reviewed dietary precautions  Visual and Tactile Intraoral/ Extraoral evaluation: Oral and Oropharyngeal cancer evaluation  No findings     Dr Jordan John exam   Reviewed with patient clinical and radiographic findings and patient verbalized understanding  All questions and concerns addressed  REFERRALS: no referrals needed    CARIES FINDINGS:  Deep caries noted # 20  And # 18  Possible PAP # 18  He took antibiotics a week ago when tooth was really bothering him and it felt better  Tooth is still sens   When he bites on it ( has upper RPD)  Dr Edna Camarena explained he should have # 21 and # 18 extracted   We will make a lower RPD when other chery is completed   Needs chery # 28 DO and # 32 F    Anterior decay already charted     Discussed sugars in coffee  ( he drinks a lot of coffee during the day)  Gave RX for prevident and suggested adjunctive Fl2 rinse  CARIES RISK High   Hypo salivary function noted    Next Visit:   nv 1  Ext # 18 # 20  OS day  75 min  NV 2  chery # 28  Monitor # 34 M  nv 3 pre auth for cast metal RPD  NV 4  Other chery as charted   NV 5 recall  Next Recall: 6 month recall     Last BWX:  5/20/22  Last Panorex/ FMX :

## 2022-06-10 DIAGNOSIS — J45.51 SEVERE PERSISTENT ASTHMA WITH ACUTE EXACERBATION: ICD-10-CM

## 2022-06-10 RX ORDER — FLUTICASONE PROPIONATE AND SALMETEROL XINAFOATE 230; 21 UG/1; UG/1
AEROSOL, METERED RESPIRATORY (INHALATION)
Qty: 12 G | Refills: 6 | Status: SHIPPED | OUTPATIENT
Start: 2022-06-10

## 2022-07-18 ENCOUNTER — OFFICE VISIT (OUTPATIENT)
Dept: DENTISTRY | Facility: CLINIC | Age: 57
End: 2022-07-18

## 2022-07-18 VITALS — SYSTOLIC BLOOD PRESSURE: 135 MMHG | DIASTOLIC BLOOD PRESSURE: 81 MMHG | TEMPERATURE: 98 F

## 2022-07-18 DIAGNOSIS — K02.9 CARIES: Primary | ICD-10-CM

## 2022-07-18 PROCEDURE — D2330 RESIN-BASED COMPOSITE - 1 SURFACE, ANTERIOR: HCPCS

## 2022-07-18 NOTE — PROGRESS NOTES
Composite Filling    Jackson Alvarado Renee Salas presents for composite filling  PMH reviewed, no changes  ASA II, no pain     Applied topical benzocaine, administered 1 carps 4% articaine 1:100k epi via     Prepped tooth #27 B with 330 carbide on high speed  Caries removed with round carbide on slow speed  Placed tofflemire/palodent matrix  Isolation with cotton rolls and dri-angles    Etch with 37% H2PO4, rinse, dry  Applied Adhese with 20 second scrub once, gentle air dry and light cured for 10s  Restored with Tetric bulk russell shade A2 and light cured  Refined with finishing burs, polished with enhance point  Pt left satisfied        NV: 28 DO

## 2022-10-05 DIAGNOSIS — J45.31 MILD PERSISTENT ASTHMA WITH ACUTE EXACERBATION: ICD-10-CM

## 2022-10-05 RX ORDER — ALBUTEROL SULFATE 90 UG/1
AEROSOL, METERED RESPIRATORY (INHALATION)
Qty: 18 G | Refills: 3 | Status: SHIPPED | OUTPATIENT
Start: 2022-10-05

## 2022-11-09 ENCOUNTER — OFFICE VISIT (OUTPATIENT)
Dept: DENTISTRY | Facility: CLINIC | Age: 57
End: 2022-11-09

## 2022-11-09 VITALS — HEART RATE: 77 BPM | SYSTOLIC BLOOD PRESSURE: 119 MMHG | TEMPERATURE: 98.7 F | DIASTOLIC BLOOD PRESSURE: 71 MMHG

## 2022-11-09 DIAGNOSIS — K02.9 TOOTH DECAY: Primary | ICD-10-CM

## 2022-11-09 NOTE — PROGRESS NOTES
Composite Filling    Wylie Pais Mendel Hamming presents for composite filling  PMH reviewed, no changes  Discussed with patient need for RCT if pulp exposure occurs or in future if pulp is inflamed  Pt understands and consents  Informed pt  About visible decay #28,29    Applied topical benzocaine, administered 1 carps 2% lido 1:100k epi via IANB LR side    Prepped tooth # 28(DO), 29(MOD), with 245 carbide on high speed  Caries removed with round carbide on slow speed  Placed tofflemire/matrix  Isolation with cotton rolls and dri-angles  Placed limelite, cured    Etch with 37% H2PO4, rinse, dry  Applied Adhese with 20 second scrub once, gentle air dry and light cured for 10s  Restored with Tetric bulk russell shade A2 and light cured  Refined with finishing burs, polished with enhance point  Verified occlusion and contacts  POI given   Pt states acrylic was added by Dr James Gregory /Dr ORNELAS in sept 2021, to help close the space between denture and #25, now acrylic is wearing off, and pt  Wants space closed  Pt adv will do that at nv along with with #9, 10 fills  Pt agreed, satisfied and left ambulatory  Nv: Fills #0,92 and add acrylic to space

## 2022-11-23 ENCOUNTER — OFFICE VISIT (OUTPATIENT)
Dept: DENTISTRY | Facility: CLINIC | Age: 57
End: 2022-11-23

## 2022-11-23 VITALS — DIASTOLIC BLOOD PRESSURE: 87 MMHG | TEMPERATURE: 98.1 F | SYSTOLIC BLOOD PRESSURE: 155 MMHG | HEART RATE: 42 BPM

## 2022-11-23 DIAGNOSIS — K04.02 SYMPTOMATIC IRREVERSIBLE PULPITIS: Primary | ICD-10-CM

## 2022-11-23 PROBLEM — G56.01 CARPAL TUNNEL SYNDROME, RIGHT: Status: ACTIVE | Noted: 2020-12-16

## 2022-11-23 RX ORDER — AMOXICILLIN 500 MG/1
500 CAPSULE ORAL EVERY 8 HOURS SCHEDULED
Qty: 21 CAPSULE | Refills: 0 | Status: SHIPPED | OUTPATIENT
Start: 2022-11-23 | End: 2022-11-30

## 2022-11-23 RX ORDER — OXYCODONE 9 MG/1
9 CAPSULE, EXTENDED RELEASE ORAL EVERY 12 HOURS
COMMUNITY
Start: 2022-11-07

## 2022-11-23 NOTE — PROGRESS NOTES
#29 Palliative  63 yo male patient presents for palliative tx of #29  PMH reviewed, no changes  ASA type 2 significant for KATHLEEN, asthma, hyperlipidemia, controlled with medications  Pain: 10/10 without pain killers  Patient has been feeling pain from area of last filling ever since he had the last filling done ( 11/9/22)  He has been taking 800 mg of ibuprofen for pain twice a day since he had the fillings done  He cannot pinpoint which tooth it is but he states the pain comes from the lower right side  Pain has been waking him up at night spontaneously  PA of #28 and #29 taken  Radiolucency shown by apex of 29 but it can possibly be the mental foramen  Palpation, percussion, and bite tests for 28 and 29 were negative  Endo ice test (intensity/ time felt)  #21 (+/4 sec)  #28 (+/5 sec)  #29 (+++/10  sec lingering)    # 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  #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    Explained to patient that we can perform palliative treatment today to get him out of pain but he will need to return for the root canal  Gave patient option to get endo referral, perform endo here or have to extracted in order to get him out of pain  Patient chose to have pallative treatment done today and continue with the RCT here at this clinic  Talk to pt next time about type of crown for #29 and possibly doing a surveyed crown for a partial denture  Applied topical benzocaine, administered carps 2% lido 1:100k epi via IA and 1 carps 4% articaine 1:100k epi via infiltration/ mental block  Had a positive aspiration during the IA injection but unable to determine if heme was from vessel or injection site  Allowed pt to sit for over 5 minutes beginning procedure and verified he felt fine  Explained to pt that he might feel his heart racing but he did not feel any differences  Clamp and rubber dam placed on #29  Occlusal composite removed and pulp chamber accessed with 245 carbide     Pulp removed and canals accessed with explorer and size 10 file to remove remaining nerve tissue  Irrigated with sodium hypochlorite  Used 15 file and 20 file and stayed within the coronal 1/3rd  Dried canal with paper points  Placed CaOH2, cotton pellet, and restored with cavit  Amoxicillin prescription sent to pharmacy  Pt left satisfied and ambulatory       ?      NV:  Prophy/ Recall on Friday  Nnv: #9 and #10 Resins  Nnnv: #29 RCT/ core build up  Nnnnv: Diagnostic cast for treatment planning (Cast needed to plan #29 Crown design and RPD type before sending it for pre-auth)

## 2022-11-25 ENCOUNTER — OFFICE VISIT (OUTPATIENT)
Dept: DENTISTRY | Facility: CLINIC | Age: 57
End: 2022-11-25

## 2022-11-25 VITALS — TEMPERATURE: 98 F | DIASTOLIC BLOOD PRESSURE: 87 MMHG | SYSTOLIC BLOOD PRESSURE: 156 MMHG | HEART RATE: 47 BPM

## 2022-11-25 DIAGNOSIS — K03.6 ACCRETIONS ON TEETH: ICD-10-CM

## 2022-11-25 DIAGNOSIS — Z01.20 ENCOUNTER FOR DENTAL EXAMINATION: ICD-10-CM

## 2022-11-25 DIAGNOSIS — K08.89 NON-RESTORABLE TOOTH: Primary | ICD-10-CM

## 2022-11-25 NOTE — PROGRESS NOTES
RECALL EXAM, ADULT PROPHY      REVIEWED MED HX: meds, allergies, health changes reviewed in EPIC  CHIEF CONCERN:  Pain # 29   Endo was started a few days ago  Has been on antibiotic for 2 days  PAIN SCALE:  10/10  Cannot wear upper RPD   ASA CLASS:  II  PLAQUE:  mild     CALCULUS:      light     BLEEDING:   none    STAIN :    light    ORAL HYGIENE:  good - fair   Hypos salivary function noted and discussed again  Pt was given RX for prevident and states he uses it    Stressed sugar free cough drops if needed vs Luden's and rinsing with water during day  PERIO: stage 1 grade B    Hand scaled, polished and flossed  Used Cavitron  Oral Hygiene Instruction:  recommended brushing 2 x daily for 2 minutes MIN, recommended flossing daily, reviewed dietary precautions  Visual and Tactile Intraoral/ Extraoral evaluation: Oral and Oropharyngeal cancer evaluation  No findings     Dr Omayra Gaffney exam=   Reviewed with patient clinical and radiographic findings and patient verbalized understanding  All questions and concerns addressed  REFERRALS:    [facility] referral provided  Deep decay # 18, 20        Should eventually have # 2 extracted but we will need to work with adjusting his newer RPD    CARIES FINDINGS:   Several areas of decay as charted       TREATMENT  PLAN :   1) cont   Endo # 29   Will need SURVEY CROWN  with post and core  2)  chery as noted    Next Recall: 6 month recall /BW    Last BWX: 5/2022  Last Panorex/ FMX : 2/2021

## 2022-11-25 NOTE — PROGRESS NOTES
Pt came for hygiene check today  Advised:  1) complete rct #29 followed by p/c and surveyed crown  2) Lower cast partial dent (perio stable) Replacing #18,20 ,   (both with subging decay,clin and on x ray, pap seen #18,20, non restorable)  OS referral givn  3) Future extrn #2  Also non restorable ([facility] ([facility])  Pt wears Upper resin based partial , wants to wait for ext #2 for now, pt  understands may need new denture or will ask the lab to add a tooth to the existing denture first   Pt  Comf  With option    Nv: rct #29 (after pre auth)

## 2023-03-13 ENCOUNTER — TELEPHONE (OUTPATIENT)
Dept: PULMONOLOGY | Facility: CLINIC | Age: 58
End: 2023-03-13

## 2023-03-13 NOTE — TELEPHONE ENCOUNTER
Patient scheduled for 3/29 in [address] [facility]  Patient needs office visit and new orders to be sent to [facility] along with new insurance

## 2023-03-13 NOTE — TELEPHONE ENCOUNTER
[facility] called, they are asking for recent chart notes and a new script for his Cpap supplies  Patient has started Medicare this year and [facility] does not have his update insurance information either   Please advise

## 2023-03-29 ENCOUNTER — TELEPHONE (OUTPATIENT)
Dept: PULMONOLOGY | Facility: CLINIC | Age: 58
End: 2023-03-29

## 2023-03-29 ENCOUNTER — OFFICE VISIT (OUTPATIENT)
Dept: PULMONOLOGY | Facility: CLINIC | Age: 58
End: 2023-03-29

## 2023-03-29 VITALS
SYSTOLIC BLOOD PRESSURE: 104 MMHG | WEIGHT: 177 LBS | HEIGHT: 67 IN | OXYGEN SATURATION: 98 % | TEMPERATURE: 96.8 F | BODY MASS INDEX: 27.78 KG/M2 | HEART RATE: 73 BPM | DIASTOLIC BLOOD PRESSURE: 70 MMHG

## 2023-03-29 DIAGNOSIS — Z99.89 OSA ON CPAP: Primary | Chronic | ICD-10-CM

## 2023-03-29 DIAGNOSIS — R91.8 LUNG NODULES: ICD-10-CM

## 2023-03-29 DIAGNOSIS — G47.33 OSA ON CPAP: Primary | Chronic | ICD-10-CM

## 2023-03-29 DIAGNOSIS — Z72.0 TOBACCO ABUSE: ICD-10-CM

## 2023-03-29 DIAGNOSIS — J45.51 SEVERE PERSISTENT ASTHMA WITH ACUTE EXACERBATION: ICD-10-CM

## 2023-03-29 NOTE — ASSESSMENT & PLAN NOTE
Compliance: 12/26/2022- 3/25/2023  -Patient reports he has not been compliant secondary to him needing supplies  -Patient reports he wakes up 6-7 times a night due to feeling claustrophobic, mouth dryness and leak time  - Day with device usage: 57/90  -average usage: 3Hrs 12 min  -CPAP pressure: 8 cm H2O  -Leak time: 43 min 58 sec  -AHI: 6 6      -placed order for: Nasal pillows and cpap supplies

## 2023-03-29 NOTE — TELEPHONE ENCOUNTER
Order for mask fitting and Cpap supplies was faxed to [facility] along with office notes

## 2023-03-29 NOTE — ASSESSMENT & PLAN NOTE
- Patient is currently without acute exacerbation  -Patient denies any wheezing, chest tightness, or acute shortness of breath  -Patient reports some dyspnea upon ambulation where he uses his albuterol MDI  -We will continue Advair 230/21 MCG 1 puff daily, and Spiriva  -no Indication for steroid therapy at this time

## 2023-03-29 NOTE — ASSESSMENT & PLAN NOTE
2021-lung screening showed 2 left lobe lung nodule  -She reports losing 90 pounds in approximately 4 months  -Very concerned for possible underlying malignancy  -Placed order for chest CT  -We will call patient with results  -Patient is having some lower blood pressures this morning with SBP's in the 100  -I have asked patient to check his blood pressure at home and at night and update his PCP if blood pressures remain low

## 2023-03-29 NOTE — PROGRESS NOTES
"Pulmonary Follow-Up Note   Deena Stephens 62 y o  male MRN: [id number]  3/29/2023      Assessment/Plan:    Problem List Items Addressed This Visit        Respiratory    KATHLEEN on CPAP - Primary (Chronic)     Compliance: 12/26/2022- 3/25/2023  -Patient reports he has not been compliant secondary to him needing supplies  -Patient reports he wakes up 6-7 times a night due to feeling claustrophobic, mouth dryness and leak time  - Day with device usage: 57/90  -average usage: 3Hrs 12 min  -CPAP pressure: 8 cm H2O  -Leak time: 43 min 58 sec  -AHI: 6 6      -placed order for: Nasal pillows and cpap supplies            Relevant Orders    PAP DME Resupply/Reorder    Mask fitting only    Severe persistent asthma with acute exacerbation     - Patient is currently without acute exacerbation  -Patient denies any wheezing, chest tightness, or acute shortness of breath  -Patient reports some dyspnea upon ambulation where he uses his albuterol MDI  -We will continue Advair 230/21 MCG 1 puff daily, and Spiriva  -no Indication for steroid therapy at this time            Other    Lung nodules     2021-lung screening showed 2 left lobe lung nodule  -She reports losing 90 pounds in approximately 4 months  -Very concerned for possible underlying malignancy  -Placed order for chest CT  -We will call patient with results  -Patient is having some lower blood pressures this morning with SBP's in the 100  -I have asked patient to check his blood pressure at home and at night and update his PCP if blood pressures remain low         Relevant Orders    CT chest wo contrast    Tobacco abuse     - Patient reports he quit smoking 2016  -Encourage and congratulated on continued tobacco cessation                No follow-ups on file  History of Present Illness   Reason for Visit: 2 year follow up  Chief Complaint: \" I feel okay\"  HPI: Deena Stephens is a 62 y o  male who presents to the office today for 2-year follow-up    Patient reports that " overall his asthma has been stable on his Advair and Spiriva  Patient reports he has difficulty with his CPAP as it causes him significant dryness and he has leak time  Patient reports that this wakes him up approximately 6-7 times a week  Patient reports that he has had a 90 pound weight loss in approximately 4 months  Given previous history of nodules patient will get CT scan immediately  Placed order for CPAP supplies and nasal pillows  Patient will follow-up in office in 6 months  I will call patient with CT scan results  Review of Systems   Constitutional: Negative for chills and fever  HENT: Negative for ear pain and sore throat  Eyes: Negative for pain and visual disturbance  Respiratory: Positive for cough and shortness of breath  Negative for apnea, choking, chest tightness, wheezing and stridor  Cardiovascular: Negative for chest pain and palpitations  Gastrointestinal: Negative for abdominal pain and vomiting  Genitourinary: Negative for dysuria and hematuria  Musculoskeletal: Negative for arthralgias and back pain  Skin: Negative for color change and rash  Neurological: Negative for seizures and syncope  Psychiatric/Behavioral: Negative for agitation and behavioral problems  All other systems reviewed and are negative        Historical Information   Past Medical History:   Diagnosis Date   • Collapsed lung    • CPAP (continuous positive airway pressure) dependence    • Hypertension    • Lumbago    • KATHLEEN (obstructive sleep apnea)    • Recurrent spontaneous pneumothorax    • Right-sided chest pain    • Spontaneous pneumothorax    • Umbilical hernia      Past Surgical History:   Procedure Laterality Date   • BACK SURGERY     • [place] [address] INCL FLUOR GDNCE DX W/CELL WASHG SPX N/A 5/28/2019    Procedure: BRONCHOSCOPY FLEXIBLE;  Surgeon: Jillian Mccauley MD;  Location: [facility];  Service: Thoracic   • [place] THORACOSCOPY W/PLEURODESIS Left 5/28/2019    Procedure: Marko Garcia VIDEO ASSISTED SURGERY (VATS);  MECHANICAL PLEURODESIS; BLEB RESECTION x 2;  Surgeon: Bridger Ruiz MD;  Location: [facility];  Service: Thoracic   • ROTATOR CUFF REPAIR       Family History   Problem Relation Age of Onset   • Asthma Mother    • Deep vein thrombosis Father      Social History   Social History     Substance and Sexual Activity   Alcohol Use Never     Social History     Substance and Sexual Activity   Drug Use Never     Social History     Tobacco Use   Smoking Status Former   • Packs/day: 1 00   • Years: 38 00   • Pack years: 38 00   • Types: Cigarettes   • Start date:    • Quit date:    • Years since quittin 2   Smokeless Tobacco Never     E-Cigarette/Vaping   • E-Cigarette Use Never User      E-Cigarette/Vaping Substances   • Nicotine No    • THC No    • CBD No    • Flavoring No    • Other No    • Unknown No        Meds/Allergies     Current Outpatient Medications:   •  acetaminophen (TYLENOL) 325 mg tablet, Take 2 tablets (650 mg total) by mouth every 6 (six) hours as needed for mild pain (Patient not taking: Reported on 2022), Disp: 30 tablet, Rfl: 0  •  Advair -21 MCG/ACT inhaler, inhale 2 puffs by mouth twice a day --RINSE MOUTH AFTER USE, Disp: 12 g, Rfl: 6  •  albuterol (2 5 mg/3 mL) 0 083 % nebulizer solution, Take 1 vial (2 5 mg total) by nebulization every 6 (six) hours as needed for wheezing or shortness of breath, Disp: 360 mL, Rfl: 4  •  albuterol (PROVENTIL HFA,VENTOLIN HFA) 90 mcg/act inhaler, inhale 2 puffs by mouth every 6 hours if needed for wheezing, Disp: 18 g, Rfl: 3  •  albuterol (Ventolin HFA) 90 mcg/act inhaler, Inhale 2 puffs every 6 (six) hours as needed for wheezing (Patient not taking: No sig reported), Disp: 18 g, Rfl: 5  •  benralizumab (FASENRA) subcutaneous injection, Inject 1 mL (30 mg total) under the skin every 56 days (Patient not taking: Reported on 2022), Disp: 1 Syringe, Rfl: 5  •  cyclobenzaprine (FLEXERIL) 5 mg tablet, Take 5 mg by mouth 3 (three) times a day as needed for muscle spasms (Patient not taking: No sig reported), Disp: , Rfl:   •  docusate sodium (COLACE) 100 mg capsule, Take 1 capsule (100 mg total) by mouth 2 (two) times a day While taking narcotic pain medication (Patient not taking: No sig reported), Disp: 10 capsule, Rfl: 0  •  Duexis 800-26 6 MG TABS, , Disp: , Rfl:   •  EPINEPHrine (EPIPEN) 0 3 mg/0 3 mL SOAJ, Inject 0 3 mL (0 3 mg total) into a muscle once for 1 dose, Disp: 0 6 mL, Rfl: 0  •  Fasenra subcutaneous injection, INJECT 1 SYRINGE UNDER THE SKIN AT WEEK 0, 4 AND 8, THEN INJECT 1 SYRINGE EVERY 8 WEEKS THEREAFTER   (Patient not taking: Reported on 3/14/2022), Disp: 1 Syringe, Rfl: 6  •  ibuprofen (MOTRIN) 200 mg tablet, Take 800 mg by mouth every 6 (six) hours as needed for mild pain  (Patient not taking: No sig reported), Disp: , Rfl:   •  levalbuterol (XOPENEX) 1 25 mg/0 5 mL nebulizer solution, Take 0 5 mL (1 25 mg total) by nebulization every 8 (eight) hours as needed for wheezing, Disp: 30 mL, Rfl: 0  •  montelukast (SINGULAIR) 10 mg tablet, Take 1 tablet (10 mg total) by mouth daily at bedtime (Patient not taking: Reported on 5/20/2022), Disp: 30 tablet, Rfl: 6  •  oxyCODONE-acetaminophen (PERCOCET) 5-325 mg per tablet, 1 tablet every 4 (four) hours as needed for moderate pain (Patient not taking: Reported on 11/25/2022), Disp: , Rfl:   •  polyethylene glycol (GLYCOLAX) 17 GM/SCOOP powder, TAKE 238 GRAM BY MOUTH AS DIRECTED (Patient not taking: No sig reported), Disp: , Rfl:   •  rosuvastatin (CRESTOR) 40 MG tablet, Take 40 mg by mouth daily, Disp: , Rfl:   •  tiotropium (SPIRIVA RESPIMAT) 2 5 MCG/ACT AERS inhaler, Inhale 2 puffs daily, Disp: 4 g, Rfl: 0  •  traMADol (ULTRAM) 50 mg tablet, Take 50 mg by mouth every 6 (six) hours as needed for moderate pain Chronic back pain, Disp: , Rfl:   •  triamcinolone (KENALOG) 0 1 % oral topical paste, Apply 1 application topically 2 (two) times a day (Patient not "taking: No sig reported), Disp: 1 g, Rfl: 0  •  Xtampza ER 9 MG C12A, Take 9 mg by mouth every 12 (twelve) hours, Disp: , Rfl:   No Known Allergies    Vitals: Blood pressure 104/70, pulse 73, temperature (!) 96 8 °F (36 °C), temperature source Tympanic, height 5' 7\" (1 702 m), weight 80 3 kg (177 lb), SpO2 98 %  Body mass index is 27 72 kg/m²  Oxygen Therapy  SpO2: 98 %  Oxygen Therapy: None (Room air)    Physical Exam:  Physical Exam  Constitutional:       General: He is not in acute distress  Appearance: Normal appearance  He is normal weight  He is not ill-appearing  HENT:      Head: Normocephalic and atraumatic  Nose: Nose normal  No congestion or rhinorrhea  Mouth/Throat:      Mouth: Mucous membranes are dry  Pharynx: No oropharyngeal exudate or posterior oropharyngeal erythema  Cardiovascular:      Rate and Rhythm: Normal rate and regular rhythm  Pulses: Normal pulses  Heart sounds: No murmur heard  No friction rub  No gallop  Pulmonary:      Effort: Pulmonary effort is normal  No tachypnea, bradypnea, accessory muscle usage or respiratory distress  Breath sounds: Decreased air movement present  No stridor  Decreased breath sounds present  No wheezing, rhonchi or rales  Chest:      Chest wall: No tenderness  Abdominal:      General: Abdomen is flat  Bowel sounds are normal  There is no distension  Palpations: Abdomen is soft  There is no mass  Musculoskeletal:         General: No swelling or tenderness  Normal range of motion  Cervical back: Normal range of motion  No rigidity or tenderness  Skin:     General: Skin is warm and dry  Coloration: Skin is not jaundiced or pale  Neurological:      General: No focal deficit present  Mental Status: He is alert and oriented to person, place, and time  Mental status is at baseline     Psychiatric:         Mood and Affect: Mood normal          Behavior: Behavior normal              Imaging and other " "studies: I have personally reviewed pertinent films in PACS     Chest cT - 10/6/2021- MARI lung nodules    Pulmonary Results (PFTs, PSG): I have personally reviewed pertinent films in PACS     Results: 1/7/2020  FEV1/FVC Ratio:  75 %  Forced Vital Capacity:  4 40 L   97 % predicted  FEV1:  3 28 L    92 % predicted  After administration of bronchodilator   FVC:  4 59 L, 101 % predicted, +4 % change  FEV1:  3 46 L, 97 % predicted, +5 % change     Lung volumes by body plethysmography:   Total Lung Capacity 101 % predicted   Residual volume 105 % predicted     DLCO corrected for patients hemoglobin level:  99 %     Interpretation:     • Normal Spirometry     • No significant response to the administration to bronchodilator per ATS Standards     • Normal Lung volumes     • Normal diffusion     • Normal flow volume loops        BIJAN Bautista  [facility]        Portions of the record may have been created with voice recognition software  Occasional wrong word or \"sound a like\" substitutions may have occurred due to the inherent limitations of voice recognition software  Read the chart carefully and recognize, using context, where substitutions have occurred or contact the dictating provider    "

## 2023-04-04 NOTE — TELEPHONE ENCOUNTER
Kranthi Kramer called asking for all the information that we had sent on 3/29  They stated they have not received any information yet regarding his supply script and progress notes   Please advise    Call Back #[phone] et:[phone]

## 2023-04-05 ENCOUNTER — HOSPITAL ENCOUNTER (OUTPATIENT)
Dept: CT IMAGING | Facility: HOSPITAL | Age: 58
Discharge: HOME | End: 2023-04-05

## 2023-04-05 DIAGNOSIS — R91.8 LUNG NODULES: ICD-10-CM

## 2023-05-31 ENCOUNTER — OFFICE VISIT (OUTPATIENT)
Dept: DENTISTRY | Facility: CLINIC | Age: 58
End: 2023-05-31

## 2023-05-31 VITALS — TEMPERATURE: 98 F | HEART RATE: 76 BPM | DIASTOLIC BLOOD PRESSURE: 75 MMHG | SYSTOLIC BLOOD PRESSURE: 114 MMHG

## 2023-05-31 DIAGNOSIS — Z01.20 ENCOUNTER FOR DENTAL EXAMINATION: ICD-10-CM

## 2023-05-31 DIAGNOSIS — K02.9 DENTAL CARIES: ICD-10-CM

## 2023-05-31 DIAGNOSIS — K08.89 NON-RESTORABLE TOOTH: Primary | ICD-10-CM

## 2023-05-31 DIAGNOSIS — K03.6 ACCRETIONS ON TEETH: ICD-10-CM

## 2023-05-31 PROBLEM — K05.30 PERIODONTITIS: Status: ACTIVE | Noted: 2023-05-31

## 2023-05-31 RX ORDER — BUSPIRONE HYDROCHLORIDE 5 MG/1
5 TABLET ORAL 3 TIMES DAILY
COMMUNITY

## 2023-05-31 RX ORDER — TAMSULOSIN HYDROCHLORIDE 0.4 MG/1
0.4 CAPSULE ORAL
COMMUNITY
Start: 2023-03-08 | End: 2024-03-07

## 2023-05-31 NOTE — DENTAL PROCEDURE DETAILS
Asa 3  SEE EXAM NOTES     Prophylaxis completed with    hand instrumentation  Soft plaque removed and supragingival calculus removed   BWX 4 AND PAX TAKEN OF # 29, 20, 18    WAS INTIALLY DENIED FOR ENDO # 20  RE SUBMIT now that he is covered by 2 insurances     # 18 has PAP  referred again for ext # 18, 20  Polished with prophy cup and paste  Flossed and provided Oral Health Instructions  Demonstrated proper brushing and flossing technique  Patient left satisfied and ambulatory   6 mos recall

## 2023-05-31 NOTE — DENTAL PROCEDURE DETAILS
Katiuska Hughes presents for a Periodic exam  Verbal consent for treatment given in addition to the forms  Reviewed health history - Patient is ASA III  recently diagnosed with prostate cancer  just   meeting with oncologist- currently receiving hormone infusion only  Consents signed: Yes     Perio: No findings  Pain Scale: 0  Caries Assessment: High  Radiographs: Bitewings x4  Periaplical tooth #*43  Periapical teeth # 18,20     NOTE  WE HAVE DISCUSSED # 25, 21 and referred for ext  extensive decay  # 18 appears to have PAP developing    gave new referral for ext   Patient interested in lower RPD but needs stage 1 completed     Oral Hygiene instruction reviewed and given  Recommended Hygiene recall visits with [facility]  EXAM  DR MYRICK/ DR Alejandra Nieves ( confirmed ext 18,20 at [facility])     Treatment Plan:  1  Infection control: referred for ext # 20, 18  2  Periodontal therapy: adult prophy/  3  Caries control: as charted   needs to have endo # 29 completed or EXT  4  Occlusal evaluation:     Prognosis is guarded  Referrals needed:[facility]  STAT ext # 18, 20  Next Visit:  Recall  6 MOS  Matilde  AS CHARTED    # 8  EVAL DL # 7    Patient has expressed wanted to have space corrected on anterior section of his RPD  it was made here 8/2021   there is a note stating scrylic was added to # 12 area, but patient states it has come off   He also thinks acrylic is too high on buccal surfaces    EVALUATE RPD WHEN HE RETURNS FOR NEXT RESTORATIVE PROCEDURE  there are items in treatment plan that cannot be deleted because they were placed in 2021  the max  RPD was delivered but still looks like it is planned treatment

## 2023-07-13 ENCOUNTER — OFFICE VISIT (OUTPATIENT)
Dept: DENTISTRY | Facility: CLINIC | Age: 58
End: 2023-07-13

## 2023-07-13 VITALS — HEART RATE: 65 BPM | DIASTOLIC BLOOD PRESSURE: 81 MMHG | SYSTOLIC BLOOD PRESSURE: 145 MMHG | TEMPERATURE: 97.6 F

## 2023-07-13 DIAGNOSIS — S02.5XXS: ICD-10-CM

## 2023-07-13 DIAGNOSIS — K02.9 CARIES INVOLVING MULTIPLE SURFACES OF TOOTH: Primary | ICD-10-CM

## 2023-07-13 PROCEDURE — D2335 RESIN-BASED COMPOSITE - 4 OR MORE SURFACES OR INVOLVING INCISAL ANGLE (ANTERIOR): HCPCS | Performed by: DENTIST

## 2023-07-13 NOTE — PROGRESS NOTES
Composite Filling    Monique Stevens presents for composite filling of tooth #7DL and MFLI. PMH reviewed, no changes. Chief Complain: Fractured and lost old composite filling of tooth #7 MFLI due to trauma (Hit by the door). Oral exam reveals tooth #7 is fractured and lost old composite filling on MFL involving incisal angle. Discussed with patient need for RCT if pulp exposure occurs or in future if pulp is inflamed. Pt understands and consents. Applied topical benzocaine, administered carps 2% lido 1:100k epi via maxillary anterior infiltration injection. Prepped tooth #7 with 245 carbide on high speed. Caries removed with round carbide on slow speed. Placed mylar strip and wedge. Isolation with cotton rolls and dri-angles. Aldona Mckeon is used. Etch with 37% H2PO4, rinse, dry. Applied Adhese with 20 second scrub once, gentle air dry and light cured for 10s. Restored with Tetric bulk russell shade A3 and light cured. Refined with finishing burs, polished with enhance point. Verified occlusion and contacts. Pt is satisfied, very happy with his tooth and chief complain is addressed  Pt left ambulatory. NV: Composite restorations teeth #8 MFL/DL and #9 DFL.

## 2023-12-07 ENCOUNTER — OFFICE VISIT (OUTPATIENT)
Dept: DENTISTRY | Facility: CLINIC | Age: 58
End: 2023-12-07

## 2023-12-07 VITALS — SYSTOLIC BLOOD PRESSURE: 109 MMHG | DIASTOLIC BLOOD PRESSURE: 69 MMHG | TEMPERATURE: 98 F | HEART RATE: 77 BPM

## 2023-12-07 DIAGNOSIS — Z01.20 ENCOUNTER FOR DENTAL EXAMINATION: ICD-10-CM

## 2023-12-07 DIAGNOSIS — K08.89 NON-RESTORABLE TOOTH: Primary | ICD-10-CM

## 2023-12-07 DIAGNOSIS — Z91.843 RISK FOR DENTAL CARIES, HIGH: ICD-10-CM

## 2023-12-07 DIAGNOSIS — K03.6 DENTAL CALCULUS: ICD-10-CM

## 2023-12-07 PROCEDURE — D0120 PERIODIC ORAL EVALUATION - ESTABLISHED PATIENT: HCPCS | Performed by: DENTIST

## 2023-12-07 PROCEDURE — D1110 PROPHYLAXIS - ADULT: HCPCS

## 2023-12-07 NOTE — DENTAL PROCEDURE DETAILS
SEE EXAM  Prophylaxis completed with  hand instrumentation. Soft plaque removed and supragingival calculus removed from   DISCUSSED ADJUNCTIVE FL2 RINSE AND PREVIDENT   STRESSED MORE WATER   he states he has noticed dry mouth recently  he NO LONGER uses C PAP machine since he lost weight    Polished with prophy cup and paste. Flossed and provided Oral Health Instructions. Demonstrated proper brushing and flossing technique. Patient left satisfied and ambulatory.     Nv  6 mos recall   bw 4    Continue with chery as charted  Pre auth RCT # 29 and if denied discuss SFS again

## 2023-12-07 NOTE — DENTAL PROCEDURE DETAILS
Pily Heredia presents for a Periodic exam. Verbal consent for treatment given in addition to the forms. Reviewed health history - Patient is ASA II-- he recently completed treatments for PROSTATE CANCER      Consents signed: Yes    EXAM   DR SAMMY Clifford: Slight bleeding and Recession  Pain Scale:  sens LL areas where he was told teeth need to be extracted   Caries Assessment: High  Radiographs: None     Oral Hygiene instruction reviewed and given. Recommended Hygiene recall visits with the [facility] Treatment Plan:  1. Infection control:    2. Periodontal therapy: adult prophy/   3. Caries control: as charted     4.   Occlusal evaluation:      Prognosis is Good- FAIR   HYPO SALIVARY FUNCTION DISCUSSED AGAIN    Referrals needed: gave NEW REFERRAL FOR EXT # 2, 18,20    Next Visit:   CHERY # 10   continue with chery as charted    Patient will likely need to have clasp cut off # 2 after it is extracted    his upper partial was made here 8/2021-- we cannot remove the green from chart    Need to follow up on ENDO # 28   PRE AUTH WAS DENIED  ATTEMPT TO PRE [address]

## 2023-12-13 ENCOUNTER — OFFICE VISIT (OUTPATIENT)
Dept: DENTISTRY | Facility: CLINIC | Age: 58
End: 2023-12-13

## 2023-12-13 VITALS — TEMPERATURE: 98 F | SYSTOLIC BLOOD PRESSURE: 122 MMHG | DIASTOLIC BLOOD PRESSURE: 47 MMHG | HEART RATE: 64 BPM

## 2023-12-13 DIAGNOSIS — K02.9 SECONDARY DENTAL CARIES ASSOCIATED WITH FAILED OR DEFECTIVE DENTAL RESTORATION: Primary | ICD-10-CM

## 2023-12-13 DIAGNOSIS — K08.50 SECONDARY DENTAL CARIES ASSOCIATED WITH FAILED OR DEFECTIVE DENTAL RESTORATION: Primary | ICD-10-CM

## 2023-12-13 PROCEDURE — D2335 RESIN-BASED COMPOSITE - 4 OR MORE SURFACES OR INVOLVING INCISAL ANGLE (ANTERIOR): HCPCS | Performed by: DENTIST

## 2023-12-13 NOTE — DENTAL PROCEDURE DETAILS
Composite Filling #10 MFLI angle. Joselyn Acosta presents for composite filling. Patient consent treatment. PMH reviewed, no changes. ASA: II  DX: tooth #10 lost old composite filling MFLI with recurrent caries. Preexisting diastema with tooth #9. Discussed with patient need for RCT if pulp exposure occurs or in future if pulp is inflamed. Pt understands and consents. Applied topical benzocaine, administered half one carps 2% lido 1:100k epi via maxillary infiltration. Prepped tooth #10 MFLI with 330 carbide on high speed. Caries removed with round carbide on slow speed. Placed mylar strip and wedge. Isolation with cotton rolls and dri-angles  Etch with 37% H2PO4, rinse, dry. Applied Adhese with 20 second scrub once, gentle air dry and light cured for 10s. Restored with Tetric flowable and bulk russell shade A3 and light cured. Refined with finishing burs, polished with enhance point. Verified occlusion and contacts. Verified seating of existing upper partial denture and patient approved. POI is given. Pt left satisfied and ambulatory. NV: Continue restorative care.

## 2024-06-10 PROBLEM — C61 PROSTATE CANCER (HCC): Status: ACTIVE | Noted: 2023-06-18

## 2024-06-10 PROBLEM — R97.20 ELEVATED PSA: Status: ACTIVE | Noted: 2023-04-26
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
## 2024-06-10 PROBLEM — S22.39XA RIB FRACTURE: Status: ACTIVE | Noted: 2024-04-26
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
## 2024-12-31 DIAGNOSIS — J45.31 MILD PERSISTENT ASTHMA WITH ACUTE EXACERBATION: ICD-10-CM

## 2025-01-08 RX ORDER — ALBUTEROL SULFATE 90 UG/1
2 INHALANT RESPIRATORY (INHALATION) EVERY 6 HOURS PRN
Qty: 18 G | Refills: 5 | Status: SHIPPED | OUTPATIENT
Start: 2025-01-08

## (undated) DEVICE — 3000CC GUARDIAN II: Brand: GUARDIAN

## (undated) DEVICE — THE ECHELON, ECHELON ENDOPATH™ AND ECHELON FLEX™ FAMILIES OF ENDOSCOPIC LINEAR CUTTERS AND RELOADS ARE STERILE, SINGLE PATIENT USE INSTRUMENTS THAT SIMULTANEOUSLY CUT AND STAPLE TISSUE. THERE ARE SIX STAGGERED ROWS OF STAPLES, THREE ON EITHER SIDE OF THE CUT LINE. THE 45 MM INSTRUMENTS HAVE A STAPLE LINE THATIS APPROXIMATELY 45 MM LONG AND A CUT LINE THAT IS APPROXIMATELY 42 MM LONG. THE SHAFT CAN ROTATE FREELY IN BOTH DIRECTIONS AND AN ARTICULATION MECHANISM ON ARTICULATING INSTRUMENTS ENABLES BENDING THE DISTAL PORTIONOF THE SHAFT TO FACILITATE LATERAL ACCESS OF THE OPERATIVE SITE.THE INSTRUMENTS ARE SHIPPED WITHOUT A RELOAD AND MUST BE LOADED PRIOR TO USE. A STAPLE RETAINING CAP ON THE RELOAD PROTECTS THE STAPLE LEG POINTS DURING SHIPPING AND TRANSPORTATION. THE INSTRUMENTS’ LOCK-OUT FEATURE IS DESIGNED TO PREVENT A USED RELOAD FROM BEING REFIRED.: Brand: ECHELON ENDOPATH

## (undated) DEVICE — ADHESIVE SKN CLSR HISTOACRYL FLEX 0.5ML LF

## (undated) DEVICE — CANISTER FOR THORACIC SUCTION SYSTEM: Brand: THOPAZ DISPOSABLE CANISTER 0.8L

## (undated) DEVICE — CAUTERY TIP POLISHER: Brand: DEVON

## (undated) DEVICE — SUT PROLENE 0 CT-1 30 IN 8424H

## (undated) DEVICE — INTENDED FOR TISSUE SEPARATION, AND OTHER PROCEDURES THAT REQUIRE A SHARP SURGICAL BLADE TO PUNCTURE OR CUT.: Brand: BARD-PARKER SAFETY BLADES SIZE 15, STERILE

## (undated) DEVICE — THE ECHELON FLEX POWERED PLUS ARTICULATING ENDOSCOPIC LINEAR CUTTERS ARE STERILE, SINGLE PATIENT USE INSTRUMENTS THAT SIMULTANEOUSLYCUT AND STAPLE TISSUE. THERE ARE SIX STAGGERED ROWS OF STAPLES, THREE ON EITHER SIDE OF THE CUT LINE. THE ECHELON FLEX 45 POWERED PLUSINSTRUMENTS HAVE A STAPLE LINE THAT IS APPROXIMATELY 45 MM LONG AND A CUT LINE THAT IS APPROXIMATELY 42 MM LONG. THE SHAFT CAN ROTATE FREELYIN BOTH DIRECTIONS AND AN ARTICULATION MECHANISM ENABLES THE DISTAL PORTION OF THE SHAFT TO PIVOT TO FACILITATE LATERAL ACCESS TO THE OPERATIVESITE.THE INSTRUMENTS ARE PACKAGED WITH A PRIMARY LITHIUM BATTERY PACK THAT MUST BE INSTALLED PRIOR TO USE. THERE ARE SPECIFIC REQUIREMENTS FORDISPOSING OF THE BATTERY PACK. REFER TO THE BATTERY PACK DISPOSAL SECTION.THE INSTRUMENTS ARE PACKAGED WITHOUT A RELOAD AND MUST BE LOADED PRIOR TO USE. A STAPLE RETAINING CAP ON THE RELOAD PROTECTS THE STAPLE LEGPOINTS DURING SHIPPING AND TRANSPORTATION. THE INSTRUMENTS’ LOCK-OUT FEATURE IS DESIGNED TO PREVENT A USED OR IMPROPERLY INSTALLED RELOADFROM BEING REFIRED OR AN INSTRUMENT FROM BEING FIRED WITHOUT A RELOAD.: Brand: ECHELON FLEX

## (undated) DEVICE — GAUZE SPONGES,16 PLY: Brand: CURITY

## (undated) DEVICE — PAD GROUNDING ADULT

## (undated) DEVICE — PVC URETHRAL CATHETER: Brand: DOVER

## (undated) DEVICE — SUT VICRYL 3-0 SH 27 IN J416H

## (undated) DEVICE — WOUND RETRACTOR AND PROTECTOR: Brand: ALEXIS WOUND PROTECTOR-RETRACTOR

## (undated) DEVICE — ELECTRODE BLADE MOD E-Z CLEAN 2.5IN 6.4CM -0012M

## (undated) DEVICE — SUT VICRYL 0 UR-6 27 IN J603H

## (undated) DEVICE — 2000CC GUARDIAN II: Brand: GUARDIAN

## (undated) DEVICE — STERILE THORACIC PACK: Brand: CARDINAL HEALTH

## (undated) DEVICE — SUT VICRYL 2-0 SH 27 IN UNDYED J417H

## (undated) DEVICE — NEEDLE 22 G X 1 1/2 SAFETY

## (undated) DEVICE — GLOVE INDICATOR PI UNDERGLOVE SZ 8 BLUE

## (undated) DEVICE — GLOVE SRG BIOGEL ECLIPSE 7.5

## (undated) DEVICE — NEEDLE SPINAL 20G X 3.5 LF

## (undated) DEVICE — 28 FR STRAIGHT – SOFT PVC CATHETER: Brand: PVC THORACIC CATHETERS

## (undated) DEVICE — ANTI-FOG SOLUTION WITH FOAM PAD: Brand: DEVON

## (undated) DEVICE — DRAPE FLUID WARMER (BIRD BATH)

## (undated) DEVICE — SINGLE TUBING WITH LARGE CONNECTOR FOR THORACIC SUCTION SYSTEM PUMP: Brand: THOPAZ TUBING SINGLE

## (undated) DEVICE — SUT MONOCRYL 4-0 PS-2 18 IN Y496G

## (undated) DEVICE — CHLORAPREP HI-LITE 26ML ORANGE

## (undated) DEVICE — SUT VICRYL 0 CT-1 27 IN J260H